# Patient Record
Sex: MALE | Employment: FULL TIME | URBAN - METROPOLITAN AREA
[De-identification: names, ages, dates, MRNs, and addresses within clinical notes are randomized per-mention and may not be internally consistent; named-entity substitution may affect disease eponyms.]

---

## 2023-03-22 ENCOUNTER — MEDICAL CORRESPONDENCE (OUTPATIENT)
Dept: HEALTH INFORMATION MANAGEMENT | Facility: CLINIC | Age: 48
End: 2023-03-22
Payer: COMMERCIAL

## 2023-03-23 ENCOUNTER — TRANSCRIBE ORDERS (OUTPATIENT)
Dept: OTHER | Age: 48
End: 2023-03-23

## 2023-03-23 DIAGNOSIS — T85.09XA OBSTRUCTED VENTRICULOPERITONEAL SHUNT, INITIAL ENCOUNTER (H): Primary | ICD-10-CM

## 2023-03-24 ENCOUNTER — TRANSCRIBE ORDERS (OUTPATIENT)
Dept: OTHER | Age: 48
End: 2023-03-24

## 2023-03-24 DIAGNOSIS — T85.09XA OBSTRUCTED VENTRICULOPERITONEAL SHUNT, INITIAL ENCOUNTER (H): Primary | ICD-10-CM

## 2023-03-27 ENCOUNTER — TELEPHONE (OUTPATIENT)
Dept: NEUROSURGERY | Facility: CLINIC | Age: 48
End: 2023-03-27
Payer: COMMERCIAL

## 2023-03-28 NOTE — TELEPHONE ENCOUNTER
Referred by Dr. Venkata Andrea at [address] [address]   Ref phone: [phone]   Ref fax: [phone]   Please call to schedule your appointment             Order Questions    Question Answer   Preferred Location: [facility] Neurosurgery [facility]   Scheduling Instructions: Please call to schedule your appointment   My Clinical Question Is: Obstructed ventriculoperitoneal shunt.   Patient has previously seen Dr. Kyle

## 2023-04-05 ENCOUNTER — HOSPITAL ENCOUNTER (OUTPATIENT)
Dept: CT IMAGING | Facility: CLINIC | Age: 48
Discharge: HOME | End: 2023-04-05
Attending: NURSE PRACTITIONER
Payer: COMMERCIAL

## 2023-04-05 ENCOUNTER — OFFICE VISIT (OUTPATIENT)
Dept: NEUROSURGERY | Facility: CLINIC | Age: 48
End: 2023-04-05
Attending: FAMILY MEDICINE
Payer: COMMERCIAL

## 2023-04-05 VITALS
HEART RATE: 92 BPM | BODY MASS INDEX: 28.95 KG/M2 | WEIGHT: 206.79 LBS | DIASTOLIC BLOOD PRESSURE: 70 MMHG | SYSTOLIC BLOOD PRESSURE: 112 MMHG | OXYGEN SATURATION: 95 % | HEIGHT: 71 IN

## 2023-04-05 DIAGNOSIS — G91.2 IDIOPATHIC NORMAL PRESSURE HYDROCEPHALUS (H): ICD-10-CM

## 2023-04-05 DIAGNOSIS — Z98.2 VP (VENTRICULOPERITONEAL) SHUNT STATUS: Primary | ICD-10-CM

## 2023-04-05 DIAGNOSIS — Z98.2 VP (VENTRICULOPERITONEAL) SHUNT STATUS: ICD-10-CM

## 2023-04-05 PROCEDURE — 74176 CT ABD & PELVIS W/O CONTRAST: CPT

## 2023-04-05 PROCEDURE — 99212 OFFICE O/P EST SF 10 MIN: CPT | Mod: 25 | Performed by: NURSE PRACTITIONER

## 2023-04-05 PROCEDURE — 70450 CT HEAD/BRAIN W/O DYE: CPT

## 2023-04-05 PROCEDURE — 74176 CT ABD & PELVIS W/O CONTRAST: CPT | Mod: 26 | Performed by: RADIOLOGY

## 2023-04-05 PROCEDURE — 99203 OFFICE O/P NEW LOW 30 MIN: CPT | Performed by: NURSE PRACTITIONER

## 2023-04-05 PROCEDURE — 70450 CT HEAD/BRAIN W/O DYE: CPT | Mod: 26 | Performed by: RADIOLOGY

## 2023-04-05 NOTE — PROGRESS NOTES
Neurosurgery Clinic    Dear Dr. Andrea,   Thank you for referring Edwin Clements to the pediatric neurosurgery clinic at the [facility].   I had the opportunity to meet with Edwin Clements and parents on April 5, 2023.    As you know, Edwin is a 48 year old male with a history of pseudotumor cerebri or idiopathic intracranial hypertension, status post ventriculoperitoneal shunt, his last revision was in 2015 by Dr. Kyle. Edwin notes that prior to shunt placement he was having seizures and headaches, he had multiple LPs. He notes after the shunt he had been doing well. He notes when he has a malfunction, he gets very contracted, headaches and seizures. He notes that a few weeks ago, he began experiencing severe abdominal pain, and noted a lump in the lower right side of his abdomen. He presented to an outside hospital where they noni labs, notable for an elevated CRP and abdominal imaging which revealed a cystic structure. He was directed to follow up at the [facility] for ongoing management. He presents today to discuss next steps. He notes his abdominal pain has significantly improved. He states he will get intermittent cramps but nothing severe. He denies any fevers, no redness, swelling or drainage. He denies any headaches. He is eating well and not vomiting. He is sleeping well and is awake and active throughout the day. He denies any vision changes. He denies any similar symptoms to prior shunt malfunction.     Past Medical History:   Diagnosis Date     Hydrocephalus (H)      Hypertension      Pseudotumor cerebri        Past Surgical History:   Procedure Laterality Date     APPENDECTOMY       HC OR CATH ABLATION NON-CARDIAC ENDOVASCULAR       IMPLANT SHUNT VENTRICULOPERITONEAL       REVISE SHUNT VENTRICULARPERITONEAL Right 3/26/2015    Procedure: REVISE SHUNT VENTRICULARPERITONEAL;  Surgeon: Ulisses Kyle MD;  Location:  OR  "      ALLERGIES:  Patient has no known allergies.    Current Outpatient Medications   Medication Sig Dispense Refill     hydrochlorothiazide (HYDRODIURIL) 25 MG tablet Take 25 mg by mouth daily       lisinopril (PRINIVIL,ZESTRIL) 40 MG tablet Take 40 mg by mouth daily       metoprolol (TOPROL-XL) 100 MG 24 hr tablet Take 100 mg by mouth daily       oxyCODONE (ROXICODONE) 5 MG immediate release tablet Take 1-2 tablets (5-10 mg) by mouth every 6 hours as needed for moderate to severe pain 60 tablet 0     senna-docusate (SENOKOT-S;PERICOLACE) 8.6-50 MG per tablet Take 1 tablet by mouth 2 times daily 60 tablet 1     amoxicillin (AMOXIL) 875 MG tablet Take by mouth 2 times daily Take twice a day for 14 days (Patient not taking: Reported on 4/5/2023)         No family history on file.    Social History     Tobacco Use     Smoking status: Former     Smokeless tobacco: Not on file   Vaping Use     Vaping status: Not on file   Substance Use Topics     Alcohol use: Yes         PHYSICAL EXAM:   /70 (BP Location: Right arm, Patient Position: Sitting)   Pulse 92   Ht 5' 10.67\" (179.5 cm)   Wt 206 lb 12.7 oz (93.8 kg)   SpO2 95%   BMI 29.11 kg/m      Awake and alert, no acute distress  PERRL, EOMI, face symmetric, tongue midline   shunt palpated without tenderness, no redness, swelling or drainage noted  + bowel sounds, no discomfort upon palpation    IMAGES:   CT head:                                                             Impression:  1. The shunted ventricular system is normal in caliber, but slightly more dilated compared to 3/4/2015.  2. No acute intracranial pathology.    CT abdomen:    IMPRESSION:   1. Decreased size of the loculated fluid collection in the central abdomen about the ventriculoperitoneal shunt tip measuring 14.6 cm in greatest dimension (previously 19.9 cm).   2. Stable 9 mm hemorrhagic/proteinaceous renal cyst in the right lower pole.  3. Additional incidental/chronic findings as noted " above.    ASSESSMENT:  Edwin Clements, 48 year old male with pseudotumor cerebri with shunt, neurologically stable and progressing well with decrease size of his loculated fluid collection    PLAN:  - will discuss with Dr. Stokes team to get him to be seen, worry about infectious process so would consider additional laboratory assessment and advised him to follow up with PCP  - Edwin Clements and family were counseled to please contact us with any acute worsening of symptoms, or with any questions or concerns.     This patient was discussed with neurosurgery faculty, who agrees with the above.  Krissy Hooker NP on 4/5/2023 at 3:56 PM

## 2023-04-05 NOTE — PATIENT INSTRUCTIONS
You met with Pediatric Neurosurgery at the [facility]    [address] Dr., NP      Pediatric Appointment Scheduling and Call Center:   [phone]    Nurse Practitioner  [phone]    Mailing Address  [address] [address]    Street Address   [address] [place] [address]    Fax Number  [phone]    For urgent matters that cannot wait until the next business day, occur over a holiday and/or weekend, report directly to your nearest ER or you may call [phone] and ask to page the Pediatric Neurosurgery Resident on call.

## 2023-04-05 NOTE — NURSING NOTE
Chief Complaint   Patient presents with     Consult     Obstructed ventriculoperitoneal shunt       There were no vitals filed for this visit.      Patient MyChart Active? No  If no, would they like to sign up? Yes    Has patient signed a Consent to Communicate form to discuss health information with guardians if applicable? Does not apply     Does patient need PHQ-2 completed today? Yes    Depression Response    Patient completed the PHQ-9 assessment for depression and scored >9? Yes  Question 9 on the PHQ-9 was positive for suicidality? Does not apply   Does patient have current mental health provider? Does not apply     I personally notified the following:      Jenna Macdonald  April 5, 2023

## 2023-04-11 ENCOUNTER — VIRTUAL VISIT (OUTPATIENT)
Dept: NEUROSURGERY | Facility: CLINIC | Age: 48
End: 2023-04-11
Payer: COMMERCIAL

## 2023-04-11 DIAGNOSIS — Z98.2 VP (VENTRICULOPERITONEAL) SHUNT STATUS: Primary | ICD-10-CM

## 2023-04-11 PROCEDURE — 99213 OFFICE O/P EST LOW 20 MIN: CPT | Mod: VID | Performed by: NEUROLOGICAL SURGERY

## 2023-04-11 NOTE — NURSING NOTE
Chief Complaint   Patient presents with     Video Visit     Follow up      Is the patient currently in the state of [place]? NO    Visit mode:VIDEO    If the visit is dropped, the patient can be reconnected by: VIDEO VISIT: Text to cell phone: [phone]    Will anyone else be joining the visit? NO      How would you like to obtain your AVS? MyChart    Are changes needed to the allergy or medication list? NO    Reason for visit: Follow up

## 2023-04-11 NOTE — PROGRESS NOTES
Virtual Visit Details    Type of service:  Video Visit     Originating Location (pt. Location):     20 minutes for visit.

## 2023-04-11 NOTE — LETTER
4/11/2023       RE: Edwin Clements  [address]  [facility] [address]     Dear Colleague,    Thank you for referring your patient, Edwin Clements, to the [facility] NEUROSURGERY CLINIC [place] at [facility]. Please see a copy of my visit note below.    Dear Dr. Chance,  I had the opportunity to see Mr. Clements today in my clinic via a virtual video visit.  He was in agreement with this.  Mr. Novak has been a patient of Dr. Ulisses Flores.  Dr. Flores has subsequently retired he had revised his chance that it was broken the surgery itself went fine.  The patient has had a shunt since 1991 and is dependent on that shunt.  He is aware when the shunt does not work.  When the shunt broke he developed mass along his chest wall.  This went away after Dr. Flores had repaired.  He has been doing well in the past 5 years.  However he was on his way to [place] when he was suffering from abdominal pain.  On work-up he was found to have a cyst intra-abdominal.  It appears that this shunt tip ends in the cyst.  Thinking back he has been having symptoms before he went to [place].  However the symptoms can go away by itself as well.  Because of the cyst he was referred to me.  I reviewed the images.  It appears that there is some mass effect to the adjacent tissues such as the intestines and stomach.  Sometimes this can be due to scarring from previous surgeries from which she has had.  Including 2 shunt revisions and appendix.  However he did have a CRP which is slightly elevated.  This may indicate he has a low-grade infection.  Therefore instead of just removing the shunt and placing the catheter in a different incision I have proposed to him that he has the distal portion externalized.  That way the CSF can be cultured.  If it appears that the infection is present, then the shunt needs to be replaced in its entirety.  However infectious still sterile then we will  replace the distal portion that has been externalized with the help of the general surgeons into a different area of the abdomen.  Currently the patient is feeling quite well.  He has since started at work and this is the busy season for him.  Ideally he would like to have this done in the fall.  Especially since he has minimal symptoms at the current time.  I told him that if he was to have any symptoms to please be in contact with us.  We can start implementing this plan that I had outlined.  Incidentally he has a cyst that has been noted in the his inferior pole of his kidney.  I had informed him to let his primary care doctor know.  I will schedule an appointment with him in the fall to make sure he does not fall in the cracks. Thank you          Again, thank you for allowing me to participate in the care of your patient.      Sincerely,    Arnaud Martínez MD

## 2023-04-17 ENCOUNTER — TELEPHONE (OUTPATIENT)
Dept: NEUROSURGERY | Facility: CLINIC | Age: 48
End: 2023-04-17
Payer: COMMERCIAL

## 2023-04-17 ENCOUNTER — APPOINTMENT (OUTPATIENT)
Dept: CT IMAGING | Facility: CLINIC | Age: 48
End: 2023-04-17
Attending: EMERGENCY MEDICINE
Payer: COMMERCIAL

## 2023-04-17 ENCOUNTER — HOSPITAL ENCOUNTER (INPATIENT)
Facility: CLINIC | Age: 48
LOS: 16 days | Discharge: HOME HEALTH | End: 2023-05-03
Attending: EMERGENCY MEDICINE | Admitting: SURGERY
Payer: COMMERCIAL

## 2023-04-17 DIAGNOSIS — G91.9 HYDROCEPHALUS, UNSPECIFIED TYPE (H): Primary | ICD-10-CM

## 2023-04-17 DIAGNOSIS — Z11.52 ENCOUNTER FOR SCREENING LABORATORY TESTING FOR SEVERE ACUTE RESPIRATORY SYNDROME CORONAVIRUS 2 (SARS-COV-2): ICD-10-CM

## 2023-04-17 DIAGNOSIS — R51.9 NONINTRACTABLE HEADACHE, UNSPECIFIED CHRONICITY PATTERN, UNSPECIFIED HEADACHE TYPE: ICD-10-CM

## 2023-04-17 DIAGNOSIS — Z98.2 VP (VENTRICULOPERITONEAL) SHUNT STATUS: ICD-10-CM

## 2023-04-17 LAB
ALBUMIN SERPL BCG-MCNC: 4.2 G/DL (ref 3.5–5.2)
ALP SERPL-CCNC: 90 U/L (ref 40–129)
ALT SERPL W P-5'-P-CCNC: 9 U/L (ref 10–50)
ANION GAP SERPL CALCULATED.3IONS-SCNC: 13 MMOL/L (ref 7–15)
APPEARANCE CSF: CLEAR
APTT PPP: 30 SECONDS (ref 22–38)
AST SERPL W P-5'-P-CCNC: 18 U/L (ref 10–50)
BASOPHILS # BLD AUTO: 0 10E3/UL (ref 0–0.2)
BASOPHILS NFR BLD AUTO: 0 %
BILIRUB SERPL-MCNC: 0.4 MG/DL
BUN SERPL-MCNC: 14.8 MG/DL (ref 6–20)
CALCIUM SERPL-MCNC: 9.5 MG/DL (ref 8.6–10)
CHLORIDE SERPL-SCNC: 100 MMOL/L (ref 98–107)
COLOR CSF: COLORLESS
CREAT SERPL-MCNC: 0.67 MG/DL (ref 0.67–1.17)
CRP SERPL-MCNC: 53.2 MG/L
DEPRECATED HCO3 PLAS-SCNC: 26 MMOL/L (ref 22–29)
EOSINOPHIL # BLD AUTO: 0.1 10E3/UL (ref 0–0.7)
EOSINOPHIL NFR BLD AUTO: 1 %
ERYTHROCYTE [DISTWIDTH] IN BLOOD BY AUTOMATED COUNT: 13.9 % (ref 10–15)
GFR SERPL CREATININE-BSD FRML MDRD: >90 ML/MIN/1.73M2
GLUCOSE CSF-MCNC: 57 MG/DL (ref 40–70)
GLUCOSE SERPL-MCNC: 96 MG/DL (ref 70–99)
HCT VFR BLD AUTO: 42 % (ref 40–53)
HGB BLD-MCNC: 13.7 G/DL (ref 13.3–17.7)
IMM GRANULOCYTES # BLD: 0 10E3/UL
IMM GRANULOCYTES NFR BLD: 0 %
INR PPP: 1.02 (ref 0.85–1.15)
LYMPHOCYTES # BLD AUTO: 2 10E3/UL (ref 0.8–5.3)
LYMPHOCYTES NFR BLD AUTO: 22 %
MCH RBC QN AUTO: 29.8 PG (ref 26.5–33)
MCHC RBC AUTO-ENTMCNC: 32.6 G/DL (ref 31.5–36.5)
MCV RBC AUTO: 91 FL (ref 78–100)
MONOCYTES # BLD AUTO: 0.9 10E3/UL (ref 0–1.3)
MONOCYTES NFR BLD AUTO: 10 %
NEUTROPHILS # BLD AUTO: 6 10E3/UL (ref 1.6–8.3)
NEUTROPHILS NFR BLD AUTO: 67 %
NRBC # BLD AUTO: 0 10E3/UL
NRBC BLD AUTO-RTO: 0 /100
PLATELET # BLD AUTO: 400 10E3/UL (ref 150–450)
POTASSIUM SERPL-SCNC: 3.7 MMOL/L (ref 3.4–5.3)
PROT CSF-MCNC: 7.7 MG/DL (ref 15–45)
PROT SERPL-MCNC: 7.7 G/DL (ref 6.4–8.3)
RBC # BLD AUTO: 4.6 10E6/UL (ref 4.4–5.9)
RBC # CSF MANUAL: 1 /UL (ref 0–2)
SARS-COV-2 RNA RESP QL NAA+PROBE: NEGATIVE
SODIUM SERPL-SCNC: 139 MMOL/L (ref 136–145)
TUBE # CSF: NORMAL
WBC # BLD AUTO: 9 10E3/UL (ref 4–11)
WBC # CSF MANUAL: 4 /UL (ref 0–5)

## 2023-04-17 PROCEDURE — 89050 BODY FLUID CELL COUNT: CPT

## 2023-04-17 PROCEDURE — 120N000002 HC R&B MED SURG/OB UMMC

## 2023-04-17 PROCEDURE — 70450 CT HEAD/BRAIN W/O DYE: CPT | Mod: 26 | Performed by: RADIOLOGY

## 2023-04-17 PROCEDURE — 85025 COMPLETE CBC W/AUTO DIFF WBC: CPT | Performed by: EMERGENCY MEDICINE

## 2023-04-17 PROCEDURE — 36415 COLL VENOUS BLD VENIPUNCTURE: CPT | Performed by: EMERGENCY MEDICINE

## 2023-04-17 PROCEDURE — 82945 GLUCOSE OTHER FLUID: CPT

## 2023-04-17 PROCEDURE — 70450 CT HEAD/BRAIN W/O DYE: CPT

## 2023-04-17 PROCEDURE — 250N000013 HC RX MED GY IP 250 OP 250 PS 637: Performed by: EMERGENCY MEDICINE

## 2023-04-17 PROCEDURE — 99285 EMERGENCY DEPT VISIT HI MDM: CPT | Mod: 25 | Performed by: EMERGENCY MEDICINE

## 2023-04-17 PROCEDURE — 85730 THROMBOPLASTIN TIME PARTIAL: CPT | Performed by: EMERGENCY MEDICINE

## 2023-04-17 PROCEDURE — U0003 INFECTIOUS AGENT DETECTION BY NUCLEIC ACID (DNA OR RNA); SEVERE ACUTE RESPIRATORY SYNDROME CORONAVIRUS 2 (SARS-COV-2) (CORONAVIRUS DISEASE [COVID-19]), AMPLIFIED PROBE TECHNIQUE, MAKING USE OF HIGH THROUGHPUT TECHNOLOGIES AS DESCRIBED BY CMS-2020-01-R: HCPCS

## 2023-04-17 PROCEDURE — 8C01X6J COLLECTION OF CEREBROSPINAL FLUID FROM INDWELLING DEVICE IN NERVOUS SYSTEM: ICD-10-PCS | Performed by: NEUROLOGICAL SURGERY

## 2023-04-17 PROCEDURE — 87015 SPECIMEN INFECT AGNT CONCNTJ: CPT

## 2023-04-17 PROCEDURE — 80053 COMPREHEN METABOLIC PANEL: CPT | Performed by: EMERGENCY MEDICINE

## 2023-04-17 PROCEDURE — 86140 C-REACTIVE PROTEIN: CPT

## 2023-04-17 PROCEDURE — 87075 CULTR BACTERIA EXCEPT BLOOD: CPT

## 2023-04-17 PROCEDURE — 84157 ASSAY OF PROTEIN OTHER: CPT

## 2023-04-17 PROCEDURE — C9803 HOPD COVID-19 SPEC COLLECT: HCPCS | Performed by: EMERGENCY MEDICINE

## 2023-04-17 PROCEDURE — 85610 PROTHROMBIN TIME: CPT | Performed by: EMERGENCY MEDICINE

## 2023-04-17 PROCEDURE — 99285 EMERGENCY DEPT VISIT HI MDM: CPT | Performed by: EMERGENCY MEDICINE

## 2023-04-17 PROCEDURE — 250N000013 HC RX MED GY IP 250 OP 250 PS 637

## 2023-04-17 RX ORDER — ACETAMINOPHEN 325 MG/1
325-650 TABLET ORAL EVERY 4 HOURS PRN
Status: DISCONTINUED | OUTPATIENT
Start: 2023-04-17 | End: 2023-05-02

## 2023-04-17 RX ORDER — HYDROCODONE BITARTRATE AND ACETAMINOPHEN 5; 325 MG/1; MG/1
2 TABLET ORAL ONCE
Status: COMPLETED | OUTPATIENT
Start: 2023-04-17 | End: 2023-04-17

## 2023-04-17 RX ORDER — OXYCODONE HYDROCHLORIDE 5 MG/1
5 TABLET ORAL EVERY 4 HOURS PRN
Status: DISCONTINUED | OUTPATIENT
Start: 2023-04-17 | End: 2023-04-25

## 2023-04-17 RX ORDER — HYDROMORPHONE HCL IN WATER/PF 6 MG/30 ML
0.2 PATIENT CONTROLLED ANALGESIA SYRINGE INTRAVENOUS
Status: DISCONTINUED | OUTPATIENT
Start: 2023-04-17 | End: 2023-04-19

## 2023-04-17 RX ADMIN — OXYCODONE HYDROCHLORIDE 5 MG: 5 TABLET ORAL at 18:10

## 2023-04-17 RX ADMIN — HYDROCODONE BITARTRATE AND ACETAMINOPHEN 2 TABLET: 5; 325 TABLET ORAL at 17:05

## 2023-04-17 RX ADMIN — ACETAMINOPHEN 650 MG: 325 TABLET, FILM COATED ORAL at 22:48

## 2023-04-17 RX ADMIN — OXYCODONE HYDROCHLORIDE 5 MG: 5 TABLET ORAL at 22:45

## 2023-04-17 ASSESSMENT — ACTIVITIES OF DAILY LIVING (ADL)
ADLS_ACUITY_SCORE: 35
ADLS_ACUITY_SCORE: 35
DOING_ERRANDS_INDEPENDENTLY_DIFFICULTY: NO
ADLS_ACUITY_SCORE: 18
WEAR_GLASSES_OR_BLIND: NO
CHANGE_IN_FUNCTIONAL_STATUS_SINCE_ONSET_OF_CURRENT_ILLNESS/INJURY: NO
TOILETING_ISSUES: NO
ADLS_ACUITY_SCORE: 35
FALL_HISTORY_WITHIN_LAST_SIX_MONTHS: NO
ADLS_ACUITY_SCORE: 33
CONCENTRATING,_REMEMBERING_OR_MAKING_DECISIONS_DIFFICULTY: NO
DIFFICULTY_EATING/SWALLOWING: NO
DRESSING/BATHING_DIFFICULTY: NO
WALKING_OR_CLIMBING_STAIRS_DIFFICULTY: NO

## 2023-04-17 NOTE — ED TRIAGE NOTES
Pt has shunt in head. Pt feels he is having increased pressure in head, having head pain. Feels shunt is not working. Dr. Ann's office referred pt here. Also having generalized pressure pain.     Triage Assessment     Row Name 04/17/23 3758       Triage Assessment (Adult)    Airway WDL WDL       Respiratory WDL    Respiratory WDL WDL       Skin Circulation/Temperature WDL    Skin Circulation/Temperature WDL WDL       Cardiac WDL    Cardiac WDL WDL       Peripheral/Neurovascular WDL    Peripheral Neurovascular WDL WDL       Cognitive/Neuro/Behavioral WDL    Cognitive/Neuro/Behavioral WDL WDL

## 2023-04-17 NOTE — TELEPHONE ENCOUNTER
[facility] Call Center    Phone Message    May a detailed message be left on voicemail: yes     Reason for Call: Symptoms or Concerns     If patient has red-flag symptoms, warm transfer to triage line    Current symptom or concern: Severe Headaches, slight fever lastnight    Symptoms have been present for:  1 day(s)    Has patient previously been seen for this? Yes    By : Dr. Martínez    Date: 4/11/23    Are there any new or worsening symptoms? Yes: Symptoms just started and have been worsening through the night.  Patient and wife were told to call and let us know if he experiences any of these issues after his previous visit with Dr. Martínez.      Action Taken: Message routed to:  [facility] ([facility]): [facility]    Travel Screening: Not Applicable

## 2023-04-17 NOTE — TELEPHONE ENCOUNTER
Writer routed to Neurosurgery Nurse Pool   Attn: Kelly Gillespie, RNCC for Dr. Martínez  *Patient reports symptoms     Cece Amanda LPN  Neurosurgery

## 2023-04-17 NOTE — ED PROVIDER NOTES
[place] EMERGENCY DEPARTMENT ([facility])    4/17/23       ED PROVIDER NOTE   Vertical Triage A     History     Chief Complaint   Patient presents with     Headache     HPI  Edwin Clements is a 48 year old male with history of pseudotumor cerebri s/p  shunt placement in 1991 with replacement this year at [facility] who presents for further evaluation of headache and pressure concerning for shunt malfunction.  He developed severe headaches and a slight fever last night, similar to when he has had prior shunt malfunctions.  He contacted neurosurgery service and was told to come here for further evaluation.       Epic records reviewed, he was seen at [facility] ED on 3/21/2023 for abdominal pain.  Neurosurgery was consulted, he was found to have a's large 17 cm loculated fluid collection associated with the  shunt.     Per [facility] neurosurgery neurosurgery 3/21/2023:  Tip of shunt is likely draining into this loculated collection therefore it needs to be drained and the shunt needs to be relocated. Based on patients status and labs unlikely to be infected therefore no need to replace. Would recommend culturing fluid to be sure though.    PLAN  - No acute neurosurgical intervention  - Recommend general surgery evaluation for drainage of collection and relocation of  shunt tip  - Please send cultures from fluid if drained  - Neurosurgery available for operative assistance if needed    Since then patient has had a follow-up with neurosurgery, it was felt that there is a decreased size of the loculated fluid collection based on CT imaging.  He was seen virtually by Dr. Arnaud Martínez on 4/11/2023 and discussed plan to externalize the distal portion of the  shunt and that way the CSF could be cultured.  If this was positive for infection the shunt could be replaced in entirety however if the CSF was still sterile they would replace the distal portion was externalized with help  of general surgeons to a different area of the abdomen.  Patient opted to have this done in the fall as it is the busy season for him at work    Past Medical History  Past Medical History:   Diagnosis Date     Hydrocephalus (H)      Hypertension      Pseudotumor cerebri      Past Surgical History:   Procedure Laterality Date     APPENDECTOMY       HC OR CATH ABLATION NON-CARDIAC ENDOVASCULAR       IMPLANT SHUNT VENTRICULOPERITONEAL       REVISE SHUNT VENTRICULARPERITONEAL Right 3/26/2015    Procedure: REVISE SHUNT VENTRICULARPERITONEAL;  Surgeon: Ulisses Kyle MD;  Location: [facility] OR     No current outpatient medications on file.    No Known Allergies  Family History  No family history on file.  Social History   Social History     Tobacco Use     Smoking status: Former   Substance Use Topics     Alcohol use: Yes      Past medical history, past surgical history, medications, allergies, family history, and social history were reviewed with the patient. No additional pertinent items.      A complete review of systems was performed with pertinent positives and negatives noted in the HPI, and all other systems negative.    Physical Exam   BP: 134/86  Pulse: 64  Temp: 98.5  F (36.9  C)  Resp: 18  Height: 182.9 cm (6')  SpO2: 99 %  Physical Exam  Vitals and nursing note reviewed.   Constitutional:       General: He is not in acute distress.     Appearance: He is well-developed. He is not diaphoretic.   HENT:      Head: Normocephalic and atraumatic.   Eyes:      General: No scleral icterus.     Pupils: Pupils are equal, round, and reactive to light.   Cardiovascular:      Rate and Rhythm: Normal rate and regular rhythm.      Heart sounds: Normal heart sounds. No murmur heard.  Pulmonary:      Effort: No respiratory distress.      Breath sounds: No stridor. No wheezing or rales.   Abdominal:      General: Bowel sounds are normal.      Palpations: Abdomen is soft.      Tenderness: There is no abdominal tenderness.    Musculoskeletal:         General: No tenderness.      Cervical back: Normal range of motion and neck supple.   Skin:     General: Skin is warm and dry.      Coloration: Skin is not pale.      Findings: No erythema or rash.   Neurological:      Mental Status: He is alert and oriented to person, place, and time.      Sensory: No sensory deficit.      Coordination: Coordination normal.         ED Course, Procedures, & Data      Procedures               Results for orders placed or performed during the hospital encounter of 04/17/23   CT Head w/o Contrast     Status: None    Narrative    CT HEAD W/O CONTRAST 4/17/2023 3:16 PM    History: Shunt, increased pain; Headache; Existing HA disorder with  clinical progression; No known/automatically detected potential  contraindications to MRI     Comparison: 4/5/2023 CT head, 3/4/2015 CT head    Technique: Using multidetector thin collimation helical acquisition  technique, axial, coronal and sagittal CT images from the skull base  to the vertex were obtained without intravenous contrast.   (topogram) image(s) also obtained and reviewed.    Findings:   Stable position of the right ventriculostomy shunt with the tip  terminating in the third ventricle. The ventricular system is stable  in size when compared to the 4/5/2023 exam. Incidental midline  arachnoid cyst in the posterior fossa. No periventricular  hypoattenuation. There is no mass effect, midline shift, intracranial  hemorrhage or acute loss of gray-white differentiation.    The bony calvaria and the bones of the skull base are normal. The  visualized portions of the paranasal sinuses and mastoid air cells are  clear.       Impression    Impression:  1. Stable position of right ventriculostomy catheter.  2. Stable size of the ventricular system.  3. No acute intracranial findings.    I have personally reviewed the examination and initial interpretation  and I agree with the findings.    JOSE VOGT MD          SYSTEM ID:  [id number]   Comprehensive metabolic panel     Status: Abnormal   Result Value Ref Range    Sodium 139 136 - 145 mmol/L    Potassium 3.7 3.4 - 5.3 mmol/L    Chloride 100 98 - 107 mmol/L    Carbon Dioxide (CO2) 26 22 - 29 mmol/L    Anion Gap 13 7 - 15 mmol/L    Urea Nitrogen 14.8 6.0 - 20.0 mg/dL    Creatinine 0.67 0.67 - 1.17 mg/dL    Calcium 9.5 8.6 - 10.0 mg/dL    Glucose 96 70 - 99 mg/dL    Alkaline Phosphatase 90 40 - 129 U/L    AST 18 10 - 50 U/L    ALT 9 (L) 10 - 50 U/L    Protein Total 7.7 6.4 - 8.3 g/dL    Albumin 4.2 3.5 - 5.2 g/dL    Bilirubin Total 0.4 <=1.2 mg/dL    GFR Estimate >90 >60 mL/min/1.73m2   INR     Status: Normal   Result Value Ref Range    INR 1.02 0.85 - 1.15   Partial thromboplastin time     Status: Normal   Result Value Ref Range    aPTT 30 22 - 38 Seconds   CBC with platelets and differential     Status: None   Result Value Ref Range    WBC Count 9.0 4.0 - 11.0 10e3/uL    RBC Count 4.60 4.40 - 5.90 10e6/uL    Hemoglobin 13.7 13.3 - 17.7 g/dL    Hematocrit 42.0 40.0 - 53.0 %    MCV 91 78 - 100 fL    MCH 29.8 26.5 - 33.0 pg    MCHC 32.6 31.5 - 36.5 g/dL    RDW 13.9 10.0 - 15.0 %    Platelet Count 400 150 - 450 10e3/uL    % Neutrophils 67 %    % Lymphocytes 22 %    % Monocytes 10 %    % Eosinophils 1 %    % Basophils 0 %    % Immature Granulocytes 0 %    NRBCs per 100 WBC 0 <1 /100    Absolute Neutrophils 6.0 1.6 - 8.3 10e3/uL    Absolute Lymphocytes 2.0 0.8 - 5.3 10e3/uL    Absolute Monocytes 0.9 0.0 - 1.3 10e3/uL    Absolute Eosinophils 0.1 0.0 - 0.7 10e3/uL    Absolute Basophils 0.0 0.0 - 0.2 10e3/uL    Absolute Immature Granulocytes 0.0 <=0.4 10e3/uL    Absolute NRBCs 0.0 10e3/uL   Asymptomatic COVID-19 Virus (Coronavirus) by PCR Nasopharyngeal     Status: Normal    Specimen: Nasopharyngeal; Swab   Result Value Ref Range    SARS CoV2 PCR Negative Negative    Narrative    Testing was performed using the Xpert Xpress SARS-CoV-2 Assay on the Cepheid Gene-Xpert Instrument  Systems. Additional information about this Emergency Use Authorization (EUA) assay can be found via the Lab Guide. This test should be ordered for the detection of SARS-CoV-2 in individuals who meet SARS-CoV-2 clinical and/or epidemiological criteria as well as from individuals without symptoms or other reasons to suspect COVID-19. Test performance for asymptomatic patients has only been established in anterior nasal swab specimens. This test is for in vitro diagnostic use under the FDA EUA for laboratories certified under CLIA to perform high complexity testing. This test has not been FDA cleared or approved. A negative result does not rule out the presence of PCR inhibitors in the specimen or target RNA concentration below the limit of detection for the assay. The possibility of a false negative should be considered if the patient's recent exposure or clinical presentation suggests COVID-19. This test was validated by [facility]. These Laboratories are certified under the Clinical Laboratory Improvement Amendments (CLIA) as qualified to perform high complexity testing.       Medications   oxyCODONE (ROXICODONE) tablet 5 mg (5 mg Oral $Given 4/18/23 2558)   acetaminophen (TYLENOL) tablet 325-650 mg (650 mg Oral $Given 4/18/23 0407)   HYDROmorphone (DILAUDID) injection 0.2 mg ( Intravenous Held by provider 4/17/23 3444)   HYDROmorphone (DILAUDID) injection 0.2 mg (0.2 mg Intravenous $Given 4/18/23 1133)   naloxone (NARCAN) injection 0.2 mg (has no administration in time range)     Or   naloxone (NARCAN) injection 0.4 mg (has no administration in time range)     Or   naloxone (NARCAN) injection 0.2 mg (has no administration in time range)     Or   naloxone (NARCAN) injection 0.4 mg (has no administration in time range)   lidocaine (PF) (XYLOCAINE) 1 % injection 10 mL (has no administration in time range)   lidocaine (PF) (XYLOCAINE) 1 % injection (has no administration in time range)    HYDROcodone-acetaminophen (NORCO) 5-325 MG per tablet 2 tablet (2 tablets Oral $Given 4/17/23 8695)   fentaNYL (PF) (SUBLIMAZE) injection 25-50 mcg (25 mcg Intravenous $Given 4/18/23 1360)              Critical care was not performed.     Medical Decision Making  The patient's presentation was of moderate complexity (an undiagnosed new problem with uncertain diagnosis).    The patient's evaluation involved:  review of external note(s) from 2 sources (see separate area of note for details)  ordering and/or review of 3+ test(s) in this encounter (see separate area of note for details)  review of 2 test result(s) ordered prior to this encounter (see separate area of note for details)  independent interpretation of testing performed by another health professional (see separate area of note for details)  discussion of management or test interpretation with another health professional (see separate area of note for details)    The patient's management necessitated high risk (a decision regarding hospitalization).      Assessment & Plan    48 year old male with history of pseudotumor cerebri s/p  shunt placement in 1991 with replacement this year at [facility] who presents for further evaluation of headache and pressure concerning for shunt malfunction.  Patient had a CT scan of the abdomen performed on 3/21/2023 which showed a fluid collection near the tip of the shunt in the abdomen.  This was repeated with decreased size of the collection.  Patient reports now that his headache feels similar to what he had prior to having his shunt placed.      He has a nonfocal neurologic exam, and a nontender abdomen.    CT scan of the head was performed which showed stable position of the right ventriculostomy catheter and stable size of the ventricular system.  I independently reviewed the images and saw no evidence of intracranial hemorrhage or mass.    Patient's white count was 9.0 with a hemoglobin of 13.7.   Comprehensive metabolic profile is normal.  Coags are normal.  These labs are consistent with previous readings.    Neurosurgery was contacted.  Patient was provided pain management.    Neurosurgery felt patient should be admitted to further evaluate his headaches and the functioning of his  shunt.  Patient was in agreement with this plan.    I have reviewed the nursing notes. I have reviewed the findings, diagnosis, plan and need for follow up with the patient.    Current Discharge Medication List          Final diagnoses:    (ventriculoperitoneal) shunt status       Jw Grullon MD  [facility] EMERGENCY DEPARTMENT  4/17/2023     Jw Grullon MD  04/18/23 1243

## 2023-04-17 NOTE — H&P
"[facility]       H&P NOTE    HPI: Edwin Clements is a 48 year old male who was seen for headache with concern for shunt malfunction.  He has a history of idiopathic intracranial hypertension, with  shunt placed in 1992.  His shunt was thereafter revised by Dr. Kyle in 2015 due to distal catheter fracture that resulted in a subcutaneous fluid collection on the patient's chest.  There is currently no available documentation in our electronic medical record regarding the exact model or setting of the shunt.    The patient was recently seen by Dr. Martínez via virtual visit approximately 1 week ago after outside hospital identified that the patient had a pseudocyst surrounding the distal catheter.  The patient had been feeling well up until that visit, so plan was made for distal catheter revision in the fall, at the patient's convenience.  However for the last several days the patient has started to have headaches that remind him of the type of headaches that he had prior to shunt being placed, and his vision is becoming blurry as well.  These are holocranial headaches that are worse behind his eyes and feel like his \"eyes are going to explode.\"  They have been increasing in intensity over the last few days.  Previous to the pseudocyst being identified, the patient had significant abdominal pain, which has now subsided.  No significant fever, although this morning he reports his temperature was 100 degrees.  No other infectious symptoms.  No nausea no vomiting, no double vision.        PAST MEDICAL HISTORY:   Past Medical History:   Diagnosis Date     Hydrocephalus (H)      Hypertension      Pseudotumor cerebri        PAST SURGICAL HISTORY:   Past Surgical History:   Procedure Laterality Date     APPENDECTOMY       HC OR CATH ABLATION NON-CARDIAC ENDOVASCULAR       IMPLANT SHUNT VENTRICULOPERITONEAL       REVISE SHUNT VENTRICULARPERITONEAL Right 3/26/2015    Procedure: " REVISE SHUNT VENTRICULARPERITONEAL;  Surgeon: Ulisses Kyle MD;  Location:  OR       FAMILY HISTORY: No family history on file.    SOCIAL HISTORY:   Social History     Tobacco Use     Smoking status: Former     Smokeless tobacco: Not on file   Vaping Use     Vaping status: Not on file   Substance Use Topics     Alcohol use: Yes       MEDICATIONS:  (Not in a hospital admission)      Allergies:  No Known Allergies    ROS: 10 point ROS of systems including Constitutional, Eyes, Respiratory, Cardiovascular, Gastroenterology, Genitourinary, Integumentary, Muscularskeletal, Psychiatric were all negative except for pertinent positives noted in my HPI.    Physical exam:   Blood pressure 134/86, pulse 64, temperature 98.5  F (36.9  C), temperature source Oral, resp. rate 18, height 1.829 m (6'), SpO2 99 %.  CV: RRR, no murmurs, rubs, or gallops  PULM: breathing comfortably on room air  ABD: soft, non-distended, BS+  NEUROLOGIC:  -- Awake; Alert; oriented x 3  -- Follows commands briskly  -- +repetition, calculation, and naming  -- Speech fluent, spontaneous. No aphasia or dysarthria.  -- no gaze preference. No apparent hemineglect.  Cranial Nerves:  -- visual fields full to confrontation, PERRL 3-2mm bilat and brisk, extraocular movements intact  -- face symmetrical, tongue midline  -- sensory V1-V3 intact bilaterally  -- palate elevates symmetrically, uvula midline  -- hearing grossly intact bilat  -- Trapezii 5/5 strength bilat symmetric    Motor:  Normal bulk / tone; no tremor, rigidity, or bradykinesia.  No muscle wasting or fasciculations  No Pronator Drift     Delt Bi Tri Hand Flexion/  Extension Iliopsoas Quadriceps Hamstrings Tibialis Anterior Gastroc    C5 C6 C7 C8/T1 L2 L3 L4-S1 L4 S1   R 5 5 5 5 5 5 5 5 5   L 5 5 5 5 5 5 5 5 5   Sensory:  intact to LT x 4 extremities     Reflexes:     Bi Tri BR Ranjit Pat Ach    C5-6 C7-8 C6 UMN L2-4 S1   R 2+ 2+ 2+ Norm 2+ 2+   L 2+ 2+ 2+ Norm 2+ 2+       LABS:  Last  Comprehensive Metabolic Panel:  Sodium   Date Value Ref Range Status   04/17/2023 139 136 - 145 mmol/L Final   03/26/2015 136 133 - 144 mmol/L Final     Potassium   Date Value Ref Range Status   04/17/2023 3.7 3.4 - 5.3 mmol/L Final   03/26/2015 3.8 3.4 - 5.3 mmol/L Final     Chloride   Date Value Ref Range Status   04/17/2023 100 98 - 107 mmol/L Final   03/26/2015 103 94 - 109 mmol/L Final     Carbon Dioxide   Date Value Ref Range Status   03/26/2015 27 20 - 32 mmol/L Final     Carbon Dioxide (CO2)   Date Value Ref Range Status   04/17/2023 26 22 - 29 mmol/L Final     Anion Gap   Date Value Ref Range Status   04/17/2023 13 7 - 15 mmol/L Final   03/26/2015 6 3 - 14 mmol/L Final     Glucose   Date Value Ref Range Status   04/17/2023 96 70 - 99 mg/dL Final   03/26/2015 87 70 - 99 mg/dL Final     Comment:     Effective 7/30/2014, the reference range for this assay has changed to reflect   new instrumentation/methodology.       Urea Nitrogen   Date Value Ref Range Status   04/17/2023 14.8 6.0 - 20.0 mg/dL Final   03/26/2015 22 7 - 30 mg/dL Final     Comment:     Effective 7/30/2014, the reference range for this assay has changed to reflect   new instrumentation/methodology.       Creatinine   Date Value Ref Range Status   04/17/2023 0.67 0.67 - 1.17 mg/dL Final   03/26/2015 0.83 0.66 - 1.25 mg/dL Final     GFR Estimate   Date Value Ref Range Status   04/17/2023 >90 >60 mL/min/1.73m2 Final     Comment:     eGFR calculated using 2021 CKD-EPI equation.   03/26/2015 >90  Non  GFR Calc   >60 mL/min/1.7m2 Final     Calcium   Date Value Ref Range Status   04/17/2023 9.5 8.6 - 10.0 mg/dL Final   03/26/2015 9.3 8.5 - 10.1 mg/dL Final     Comment:     Effective 7/30/2014, the reference range for this assay has changed to reflect   new instrumentation/methodology.       Lab Results   Component Value Date    WBC 9.0 04/17/2023    WBC 7.6 03/26/2015     Lab Results   Component Value Date    RBC 4.60 04/17/2023    RBC  5.18 03/26/2015     Lab Results   Component Value Date    HGB 13.7 04/17/2023    HGB 16.3 03/26/2015     Lab Results   Component Value Date    HCT 42.0 04/17/2023    HCT 46.2 03/26/2015     Lab Results   Component Value Date    MCV 91 04/17/2023    MCV 89 03/26/2015     Lab Results   Component Value Date    MCH 29.8 04/17/2023    MCH 31.5 03/26/2015     Lab Results   Component Value Date    MCHC 32.6 04/17/2023    MCHC 35.3 03/26/2015     Lab Results   Component Value Date    RDW 13.9 04/17/2023    RDW 13.1 03/26/2015     Lab Results   Component Value Date     04/17/2023     03/26/2015         IMAGING:  Recent Results (from the past 24 hour(s))   CT Head w/o Contrast    Narrative    CT HEAD W/O CONTRAST 4/17/2023 3:16 PM    History: Shunt, increased pain; Headache; Existing HA disorder with  clinical progression; No known/automatically detected potential  contraindications to MRI     Comparison: 4/5/2023 CT head, 3/4/2015 CT head    Technique: Using multidetector thin collimation helical acquisition  technique, axial, coronal and sagittal CT images from the skull base  to the vertex were obtained without intravenous contrast.   (topogram) image(s) also obtained and reviewed.    Findings:   Stable position of the right ventriculostomy shunt with the tip  terminating in the third ventricle. The ventricular system is stable  in size when compared to the 4/5/2023 exam. Incidental midline  arachnoid cyst in the posterior fossa. No periventricular  hypoattenuation. There is no mass effect, midline shift, intracranial  hemorrhage or acute loss of gray-white differentiation.    The bony calvaria and the bones of the skull base are normal. The  visualized portions of the paranasal sinuses and mastoid air cells are  clear.       Impression    Impression:  1. Stable position of right ventriculostomy catheter.  2. Stable size of the ventricular system.  3. No acute intracranial findings.    I have personally  reviewed the examination and initial interpretation  and I agree with the findings.    JOSE VOGT MD         SYSTEM ID:  [id number]         ASSESSMENT:  Edwin Clements is a 48-year-old man presenting with symptoms concerning for elevated intracranial pressure (progressive HA, blurry vision), with recent CT abdomen showing pseudocyst surrounding the distal catheter.  Overall picture is concerning for shunt malfunction.    Clinically Significant Risk Factors Present on Admission                  # Hypertension: home medication list includes antihypertensive(s)      # Overweight: Estimated body mass index is 28.05 kg/m  as calculated from the following:    Height as of this encounter: 1.829 m (6').    Weight as of 4/5/23: 93.8 kg (206 lb 12.7 oz).            RECOMMENDATIONS:    -Admit to NSGY service, floor status  -Bedside shunt tap, send CSF for culture  -Externalize distal catheter at bedside, send CSF for culture  -CBC, BMP, coags, CRP  -Ophthalmology consult for vision changes, assess for papilledema     The patient was discussed with Dr. Galaviz, neurosurgery chief resident, and Dr. Martínez, neurosurgery staff, and they agree with the above.    Lucia Vogt MD, PhD   PGY-1 Neurosurgery

## 2023-04-17 NOTE — TELEPHONE ENCOUNTER
Spoke with patient's spouse, Carmella, regarding patient symptoms. She reports that he continues to feel poorly and has had severe pressure headaches. Patient does believe that this is shunt related as pain is similar to what he has experienced in the past when his shunt has malfunctioned. Advised that patient should report to the emergency department at [facility] to be evaluated. Carmella verbalized agreement to this plan and has no other questions at this time. Dr. Martínez is aware of patient's pending arrival.

## 2023-04-18 ENCOUNTER — APPOINTMENT (OUTPATIENT)
Dept: GENERAL RADIOLOGY | Facility: CLINIC | Age: 48
End: 2023-04-18
Attending: STUDENT IN AN ORGANIZED HEALTH CARE EDUCATION/TRAINING PROGRAM
Payer: COMMERCIAL

## 2023-04-18 LAB
ANION GAP SERPL CALCULATED.3IONS-SCNC: 14 MMOL/L (ref 7–15)
BUN SERPL-MCNC: 11.8 MG/DL (ref 6–20)
CALCIUM SERPL-MCNC: 9.4 MG/DL (ref 8.6–10)
CHLORIDE SERPL-SCNC: 100 MMOL/L (ref 98–107)
CREAT SERPL-MCNC: 0.63 MG/DL (ref 0.67–1.17)
DEPRECATED HCO3 PLAS-SCNC: 22 MMOL/L (ref 22–29)
ERYTHROCYTE [DISTWIDTH] IN BLOOD BY AUTOMATED COUNT: 13.9 % (ref 10–15)
GFR SERPL CREATININE-BSD FRML MDRD: >90 ML/MIN/1.73M2
GLUCOSE SERPL-MCNC: 112 MG/DL (ref 70–99)
HCT VFR BLD AUTO: 44.3 % (ref 40–53)
HGB BLD-MCNC: 14.4 G/DL (ref 13.3–17.7)
MCH RBC QN AUTO: 29.5 PG (ref 26.5–33)
MCHC RBC AUTO-ENTMCNC: 32.5 G/DL (ref 31.5–36.5)
MCV RBC AUTO: 91 FL (ref 78–100)
PLATELET # BLD AUTO: 353 10E3/UL (ref 150–450)
POTASSIUM SERPL-SCNC: 3.9 MMOL/L (ref 3.4–5.3)
RBC # BLD AUTO: 4.88 10E6/UL (ref 4.4–5.9)
SODIUM SERPL-SCNC: 136 MMOL/L (ref 136–145)
WBC # BLD AUTO: 9.7 10E3/UL (ref 4–11)

## 2023-04-18 PROCEDURE — 250N000013 HC RX MED GY IP 250 OP 250 PS 637

## 2023-04-18 PROCEDURE — 74018 RADEX ABDOMEN 1 VIEW: CPT | Mod: 26 | Performed by: RADIOLOGY

## 2023-04-18 PROCEDURE — 36415 COLL VENOUS BLD VENIPUNCTURE: CPT

## 2023-04-18 PROCEDURE — 85014 HEMATOCRIT: CPT

## 2023-04-18 PROCEDURE — 250N000011 HC RX IP 250 OP 636: Performed by: STUDENT IN AN ORGANIZED HEALTH CARE EDUCATION/TRAINING PROGRAM

## 2023-04-18 PROCEDURE — 0JPT00Z REMOVAL OF DRAINAGE DEVICE FROM TRUNK SUBCUTANEOUS TISSUE AND FASCIA, OPEN APPROACH: ICD-10-PCS | Performed by: NEUROLOGICAL SURGERY

## 2023-04-18 PROCEDURE — 87070 CULTURE OTHR SPECIMN AEROBIC: CPT | Performed by: STUDENT IN AN ORGANIZED HEALTH CARE EDUCATION/TRAINING PROGRAM

## 2023-04-18 PROCEDURE — 250N000009 HC RX 250

## 2023-04-18 PROCEDURE — 250N000009 HC RX 250: Performed by: STUDENT IN AN ORGANIZED HEALTH CARE EDUCATION/TRAINING PROGRAM

## 2023-04-18 PROCEDURE — 74018 RADEX ABDOMEN 1 VIEW: CPT

## 2023-04-18 PROCEDURE — 80048 BASIC METABOLIC PNL TOTAL CA: CPT

## 2023-04-18 PROCEDURE — 120N000002 HC R&B MED SURG/OB UMMC

## 2023-04-18 PROCEDURE — 250N000013 HC RX MED GY IP 250 OP 250 PS 637: Performed by: STUDENT IN AN ORGANIZED HEALTH CARE EDUCATION/TRAINING PROGRAM

## 2023-04-18 RX ORDER — CITALOPRAM HYDROBROMIDE 20 MG/1
20 TABLET ORAL DAILY
COMMUNITY

## 2023-04-18 RX ORDER — NALOXONE HYDROCHLORIDE 0.4 MG/ML
0.4 INJECTION, SOLUTION INTRAMUSCULAR; INTRAVENOUS; SUBCUTANEOUS
Status: DISCONTINUED | OUTPATIENT
Start: 2023-04-18 | End: 2023-05-02

## 2023-04-18 RX ORDER — LIDOCAINE HYDROCHLORIDE 10 MG/ML
10 INJECTION, SOLUTION EPIDURAL; INFILTRATION; INTRACAUDAL; PERINEURAL ONCE
Status: COMPLETED | OUTPATIENT
Start: 2023-04-18 | End: 2023-04-18

## 2023-04-18 RX ORDER — FENTANYL CITRATE 50 UG/ML
25-50 INJECTION, SOLUTION INTRAMUSCULAR; INTRAVENOUS
Status: COMPLETED | OUTPATIENT
Start: 2023-04-18 | End: 2023-04-18

## 2023-04-18 RX ORDER — METOPROLOL SUCCINATE 50 MG/1
100 TABLET, EXTENDED RELEASE ORAL DAILY
Status: DISCONTINUED | OUTPATIENT
Start: 2023-04-18 | End: 2023-05-03 | Stop reason: HOSPADM

## 2023-04-18 RX ORDER — LISINOPRIL 40 MG/1
40 TABLET ORAL DAILY
Status: DISCONTINUED | OUTPATIENT
Start: 2023-04-18 | End: 2023-05-03 | Stop reason: HOSPADM

## 2023-04-18 RX ORDER — NALOXONE HYDROCHLORIDE 0.4 MG/ML
0.2 INJECTION, SOLUTION INTRAMUSCULAR; INTRAVENOUS; SUBCUTANEOUS
Status: DISCONTINUED | OUTPATIENT
Start: 2023-04-18 | End: 2023-05-02

## 2023-04-18 RX ORDER — HYDROCHLOROTHIAZIDE 25 MG/1
25 TABLET ORAL DAILY
Status: DISCONTINUED | OUTPATIENT
Start: 2023-04-18 | End: 2023-05-03 | Stop reason: HOSPADM

## 2023-04-18 RX ORDER — LIDOCAINE HYDROCHLORIDE 10 MG/ML
INJECTION, SOLUTION EPIDURAL; INFILTRATION; INTRACAUDAL; PERINEURAL
Status: COMPLETED
Start: 2023-04-18 | End: 2023-04-18

## 2023-04-18 RX ORDER — HYDROMORPHONE HCL IN WATER/PF 6 MG/30 ML
0.2 PATIENT CONTROLLED ANALGESIA SYRINGE INTRAVENOUS
Status: DISCONTINUED | OUTPATIENT
Start: 2023-04-18 | End: 2023-04-19

## 2023-04-18 RX ADMIN — HYDROMORPHONE HYDROCHLORIDE 0.2 MG: 0.2 INJECTION, SOLUTION INTRAMUSCULAR; INTRAVENOUS; SUBCUTANEOUS at 11:31

## 2023-04-18 RX ADMIN — OXYCODONE HYDROCHLORIDE 5 MG: 5 TABLET ORAL at 21:00

## 2023-04-18 RX ADMIN — LISINOPRIL 40 MG: 40 TABLET ORAL at 16:57

## 2023-04-18 RX ADMIN — HYDROMORPHONE HYDROCHLORIDE 0.2 MG: 0.2 INJECTION, SOLUTION INTRAMUSCULAR; INTRAVENOUS; SUBCUTANEOUS at 16:05

## 2023-04-18 RX ADMIN — METOPROLOL SUCCINATE 100 MG: 100 TABLET, EXTENDED RELEASE ORAL at 16:57

## 2023-04-18 RX ADMIN — OXYCODONE HYDROCHLORIDE 5 MG: 5 TABLET ORAL at 04:07

## 2023-04-18 RX ADMIN — LIDOCAINE HYDROCHLORIDE: 10 INJECTION, SOLUTION EPIDURAL; INFILTRATION; INTRACAUDAL; PERINEURAL at 10:30

## 2023-04-18 RX ADMIN — HYDROMORPHONE HYDROCHLORIDE 0.2 MG: 0.2 INJECTION, SOLUTION INTRAMUSCULAR; INTRAVENOUS; SUBCUTANEOUS at 19:04

## 2023-04-18 RX ADMIN — OXYCODONE HYDROCHLORIDE 5 MG: 5 TABLET ORAL at 16:56

## 2023-04-18 RX ADMIN — LIDOCAINE HYDROCHLORIDE 10 ML: 10 INJECTION, SOLUTION EPIDURAL; INFILTRATION; INTRACAUDAL; PERINEURAL at 10:30

## 2023-04-18 RX ADMIN — ACETAMINOPHEN 650 MG: 325 TABLET, FILM COATED ORAL at 04:07

## 2023-04-18 RX ADMIN — HYDROMORPHONE HYDROCHLORIDE 0.2 MG: 0.2 INJECTION, SOLUTION INTRAMUSCULAR; INTRAVENOUS; SUBCUTANEOUS at 23:43

## 2023-04-18 RX ADMIN — HYDROMORPHONE HYDROCHLORIDE 0.2 MG: 0.2 INJECTION, SOLUTION INTRAMUSCULAR; INTRAVENOUS; SUBCUTANEOUS at 07:50

## 2023-04-18 RX ADMIN — ACETAMINOPHEN 650 MG: 325 TABLET, FILM COATED ORAL at 21:01

## 2023-04-18 RX ADMIN — OXYCODONE HYDROCHLORIDE 5 MG: 5 TABLET ORAL at 08:36

## 2023-04-18 RX ADMIN — HYDROMORPHONE HYDROCHLORIDE 0.2 MG: 0.2 INJECTION, SOLUTION INTRAMUSCULAR; INTRAVENOUS; SUBCUTANEOUS at 05:38

## 2023-04-18 RX ADMIN — HYDROMORPHONE HYDROCHLORIDE 0.2 MG: 0.2 INJECTION, SOLUTION INTRAMUSCULAR; INTRAVENOUS; SUBCUTANEOUS at 02:18

## 2023-04-18 RX ADMIN — HYDROCHLOROTHIAZIDE 25 MG: 25 TABLET ORAL at 16:56

## 2023-04-18 ASSESSMENT — ACTIVITIES OF DAILY LIVING (ADL)
ADLS_ACUITY_SCORE: 18

## 2023-04-18 NOTE — PLAN OF CARE
Arrived from: ED @ 2055  Belongings/meds: Clothes and phone with patient   2 RN Skin Assessment Completed by: CT Zamora and Imani RN   Non-intact findings documented (yes/no/NA): Scab where  shunt was tapped. Skin intact otherwise.     Status: Admitted for HA with concern for shunt malfuntion. Hx of idiopathic intracranial hypertension, with  shunt placed in 1992. Shunt was revised in 2015 d/t distal catheter fracture that resulting in a subcutaneous fluid collection on the patient's chest.   Vitals: VSS on RA  Neuros: Intact ex intermittent N/T to hands and feet when HA increases.   IV: PIV SL   Resp/trach: WNL  Diet: NPO since midnight  Bowel status: LBM PTA  : Voiding spontaneously   Pain: Moderate HA controlled with PRN oxycodone  Activity: Independent in room.   Plan: Possible external shunt today

## 2023-04-18 NOTE — CONSULTS
OPHTHALMOLOGY CONSULT NOTE  04/18/23    Patient: Edwin Clements      ASSESSMENT/PLAN:     Edwin Clements is a 48 year old male who presented with headaches.    # Headache  - Patient endorses pressure behind the eyes, but denies changes in vision.   - VA 20/20  - IOP wnl   - Anterior segment was unremarkable  - No evidence of papilledema on exam, the rest of the fundus exam was unremarkable   - Will defer to neurosurgery for further management of headaches    It is our pleasure to participate in this patient's care and treatment. Please contact us with any further questions or concerns.    Discussed with Dr. Johansen who agreed with this assessment and plan.     Flor Ramirez MD     HISTORY OF PRESENTING ILLNESS:     Edwin Clements is a 48 year old male who presented on 4/17 with headaches.      10+ review of systems were otherwise negative except for that which has been stated above.      OCULAR/MEDICAL/SURGICAL HISTORIES:     Past Ocular History:   None    Eye Drops:   None      Past Medical History:  Past Medical History:   Diagnosis Date     Hydrocephalus (H)      Hypertension      Pseudotumor cerebri        Past Surgical History:   Past Surgical History:   Procedure Laterality Date     APPENDECTOMY       HC OR CATH ABLATION NON-CARDIAC ENDOVASCULAR       IMPLANT SHUNT VENTRICULOPERITONEAL       REVISE SHUNT VENTRICULARPERITONEAL Right 3/26/2015    Procedure: REVISE SHUNT VENTRICULARPERITONEAL;  Surgeon: Ulisses Kyle MD;  Location:  OR       Family History:  No relevant FMHX    Social History:  Social History     Socioeconomic History     Marital status:      Spouse name: Not on file     Number of children: Not on file     Years of education: Not on file     Highest education level: Not on file   Occupational History     Not on file   Tobacco Use     Smoking status: Former     Smokeless tobacco: Not on file   Vaping Use     Vaping status: Not on file   Substance and Sexual Activity      Alcohol use: Yes     Drug use: Not on file     Sexual activity: Not on file   Other Topics Concern     Not on file   Social History Narrative     Not on file     Social Determinants of Health     Financial Resource Strain: Not on file   Food Insecurity: Not on file   Transportation Needs: Not on file   Physical Activity: Not on file   Stress: Not on file   Social Connections: Not on file   Intimate Partner Violence: Not on file   Housing Stability: Not on file         EXAMINATION:     Base Eye Exam     Visual Acuity (Snellen - Linear)       Right Left    Near sc 20/20 20/20          Tonometry (Tonopen, 5:15 PM)       Right Left    Pressure 18 17          Pupils       Pupils    Right PERRL    Left PERRL          Visual Fields       Left Right     Full Full          Extraocular Movement       Right Left     Full Full          Dilation     Both eyes: 1.0% Mydriacyl, 2.5% Sanford Synephrine @ 1:15 PM            Slit Lamp and Fundus Exam     External Exam       Right Left    External Normal Normal          Slit Lamp Exam       Right Left    Lids/Lashes Normal Normal    Conjunctiva/Sclera White and quiet White and quiet    Cornea Clear Clear    Anterior Chamber Deep and quiet Deep and quiet    Iris Round and reactive Round and reactive    Lens Clear Clear    Anterior Vitreous Normal Normal          Fundus Exam       Right Left    Disc Normal, no papilledema Normal, no papilledema    C/D Ratio 0.4 0.4    Macula Normal Normal    Vessels Normal Normal    Periphery Normal Normal                Labs/Studies/Imaging Performed  CT HEAD   Impression:  1. Stable position of right ventriculostomy catheter.  2. Stable size of the ventricular system.  3. No acute intracranial findings.    Flor Ramirez M.D.  PGY-2 Resident Physician   Department of Ophthalmology  04/18/23 5:13 PM   Pager: [phone]

## 2023-04-18 NOTE — PLAN OF CARE
Status: Admitted for HA with concern for shunt malfuntion. Hx of idiopathic intracranial hypertension, with  shunt placed in 1992. Shunt was revised in 2015 d/t distal catheter fracture that resulting in a subcutaneous fluid collection on the patient's chest.   Vitals: VSS on RA  Neuros: Intact ex intermittent N/T to hands and feet when HA increases. Photophobic at times  IV: PIV SL   Resp/trach: WNL  Diet: Regular, good intake  Bowel status: LBM PTA  : Voiding   Pain: Moderate HA controlled with PRN oxycodone and Dilaudid  Activity: Independent in room. OK to walk around indep with drain on IV pole.  Skin: Shunt externalized at bedside this am by ANALISA MACHADO. Abd dressing CDI. CSF emptied Q3 per orders. Fluid clear, clamp open.    Plan: Continue to monitor headache pain and drain output.

## 2023-04-18 NOTE — PROCEDURES
PROCEDURE: right sided  Shunt tap.    Preprocedure Diagnosis: R/O  shunt malfunction  Postprocedure Diagnosis: As above.  Performed by: Lucia Vogt MD PhD  Attending: Dr. Martínez   Findings:  1. Opening pressure: 29 mm H2O      Details of the procedure:    Patient was placed in a right lateral position. Using sterile technique area over the shunt valve was prepped thoroughly with iodine and allowed to dry. The area was draped off. The manometer was primed with normal saline and attached to the 3-way connector. A 25 G butterfly needle was placed in the valve reservoir and 4 mls of clear CSF was drawn. The manometer was then connected to the butterfly needle. The distal end was blocked by direct manual pressure occluding the catheter distal to the valve. The opening pressure was 29 cm H2O. Direct manual pressure was used to block the proximal tubing and sluggish flow was noted when the CSF drained down the distal tubing. CSF was obtained and sent for cultures.     Patient tolerated the procedure well.  Results are pending at the time of this note. Dr. Martínez was notified of elevated opening pressure.    Lucia Vogt MD, PhD on 4/17/2023 at 7:04 PM  PGY-1 Neurosurgery

## 2023-04-18 NOTE — PROGRESS NOTES
Mr. Clements appears fairly comfortable although he does have a headache.  The tap helped a little, but as expected the headache is present now.  Shunt likely functional but due to cyst may not be draining well.We will externalize the shunt which should tell us if this is the case.  If cultures are negative, we will replace the distal part and insert into a different location in the abdomen.  If positiive, we will after abx replace the whole shunt.    Exam was non focal. eom intact, face sym, speech nl, no drift, sherri fine.

## 2023-04-18 NOTE — PHARMACY-ADMISSION MEDICATION HISTORY
Pharmacy Intern Admission Medication History    Admission medication history is complete. The information provided in this note is only as accurate as the sources available at the time of the update.    Medication reconciliation/reorder completed by provider prior to medication history? Yes    Information Source(s): Patient and CareEverywhere/SureScripts via in-person    Pertinent Information: Spoke with patient who knew his medications very well. He stated that the amoxicillin and oxycodone were from back in 2015.    Changes made to PTA medication list:    Added: per patient and confirmed with dispense report and Care Everywhere  o Citalopram 20 mg tablet  o Potassium 99mg tablet    Deleted: per patient and confirmed with dispense report and Care Everywhere  o Amoxicillin 875 mg tablet: Take by mouth 2 times daily Take twice a day for 14 days  o Oxycodone 5mg immediate release tablet: Take 1-2 (5-10 mg) tablets by mouth every 6 hours as needed for moderate to severe pain    Changed: None    Medication Affordability: Not assessed       Allergies reviewed with patient and updates made in EHR: yes    Medication History Completed By: Angelic Dinero 4/18/2023 6:16 PM    Prior to Admission medications    Medication Sig Last Dose Taking? Auth Provider Long Term End Date   citalopram (CELEXA) 20 MG tablet Take 20 mg by mouth daily 4/17/2023 at AM Yes Unknown, Entered By History Yes    hydrochlorothiazide (HYDRODIURIL) 25 MG tablet Take 25 mg by mouth daily 4/17/2023 at AM Yes Reported, Patient Yes    lisinopril (PRINIVIL,ZESTRIL) 40 MG tablet Take 40 mg by mouth daily 4/17/2023 at AM Yes Reported, Patient Yes    metoprolol (TOPROL-XL) 100 MG 24 hr tablet Take 100 mg by mouth daily 4/17/2023 at AM Yes Reported, Patient Yes    potassium 99 MG TABS Take 1 tablet by mouth daily 4/17/2023 at AM Yes Unknown, Entered By History     senna-docusate (SENOKOT-S;PERICOLACE) 8.6-50 MG per tablet Take 1 tablet by mouth 2 times  daily  Patient not taking: Reported on 4/18/2023 Not Taking  Jw Jara MD

## 2023-04-18 NOTE — PROCEDURES
Shunt externalization Note     Preoperative diagnosis: Rule out shunt infection  Postoperative diagnosis: Rule out shunt infection    Xrays obtained to localize shunt tubing trajectory above costal line, at the mid sternal line. Site was shaved. Site cleaned with chloraprep.   A total of 6 mL of lidocaine 1% without epinephrine.     A 10 blade was used to make an incision down to the subcutaneous tissue. A right angle clamp was used to elevate the shunt tubing. The shunt tubing capsule was opened with Metzenbaum scissors. The entirety of the distal tubing was then pulled out and about a length of 10cm of the distal tubing was trimmed and sent for cultures. The catheter tip was also swabbed and this was sent for cultures.     The catheter was attached to the Silver drainage system and secured. The incision was closed with a running 3-0 Nylon. A sterile dressing was applied.     Dhara Mckee MD  Neurosurgery PGY3    Please contact neurosurgery resident on call with questions.    Dial * * [phone], enter [phone] when prompted.

## 2023-04-19 ENCOUNTER — APPOINTMENT (OUTPATIENT)
Dept: CT IMAGING | Facility: CLINIC | Age: 48
End: 2023-04-19
Payer: COMMERCIAL

## 2023-04-19 LAB
ANION GAP SERPL CALCULATED.3IONS-SCNC: 14 MMOL/L (ref 7–15)
APPEARANCE CSF: CLEAR
BUN SERPL-MCNC: 10.3 MG/DL (ref 6–20)
CALCIUM SERPL-MCNC: 10 MG/DL (ref 8.6–10)
CHLORIDE SERPL-SCNC: 98 MMOL/L (ref 98–107)
COLOR CSF: COLORLESS
CREAT SERPL-MCNC: 0.6 MG/DL (ref 0.67–1.17)
DEPRECATED HCO3 PLAS-SCNC: 23 MMOL/L (ref 22–29)
ERYTHROCYTE [DISTWIDTH] IN BLOOD BY AUTOMATED COUNT: 14 % (ref 10–15)
GFR SERPL CREATININE-BSD FRML MDRD: >90 ML/MIN/1.73M2
GLUCOSE CSF-MCNC: 67 MG/DL (ref 40–70)
GLUCOSE SERPL-MCNC: 90 MG/DL (ref 70–99)
HCT VFR BLD AUTO: 45.9 % (ref 40–53)
HGB BLD-MCNC: 14.7 G/DL (ref 13.3–17.7)
LYMPH ABN NFR CSF MANUAL: 79 %
MCH RBC QN AUTO: 29 PG (ref 26.5–33)
MCHC RBC AUTO-ENTMCNC: 32 G/DL (ref 31.5–36.5)
MCV RBC AUTO: 91 FL (ref 78–100)
MONOS+MACROS NFR CSF MANUAL: 15 %
NEUTROPHILS NFR CSF MANUAL: 6 %
PLATELET # BLD AUTO: 361 10E3/UL (ref 150–450)
POTASSIUM SERPL-SCNC: 3.9 MMOL/L (ref 3.4–5.3)
PROT CSF-MCNC: 24.1 MG/DL (ref 15–45)
RBC # BLD AUTO: 5.07 10E6/UL (ref 4.4–5.9)
RBC # CSF MANUAL: 4 /UL (ref 0–2)
SODIUM SERPL-SCNC: 135 MMOL/L (ref 136–145)
TUBE # CSF: ABNORMAL
WBC # BLD AUTO: 7.3 10E3/UL (ref 4–11)
WBC # CSF MANUAL: 223 /UL (ref 0–5)

## 2023-04-19 PROCEDURE — 80048 BASIC METABOLIC PNL TOTAL CA: CPT

## 2023-04-19 PROCEDURE — 74176 CT ABD & PELVIS W/O CONTRAST: CPT

## 2023-04-19 PROCEDURE — 250N000013 HC RX MED GY IP 250 OP 250 PS 637

## 2023-04-19 PROCEDURE — 99253 IP/OBS CNSLTJ NEW/EST LOW 45: CPT | Mod: FS | Performed by: PHYSICIAN ASSISTANT

## 2023-04-19 PROCEDURE — 89051 BODY FLUID CELL COUNT: CPT

## 2023-04-19 PROCEDURE — 85014 HEMATOCRIT: CPT

## 2023-04-19 PROCEDURE — 250N000011 HC RX IP 250 OP 636

## 2023-04-19 PROCEDURE — 250N000013 HC RX MED GY IP 250 OP 250 PS 637: Performed by: STUDENT IN AN ORGANIZED HEALTH CARE EDUCATION/TRAINING PROGRAM

## 2023-04-19 PROCEDURE — 258N000003 HC RX IP 258 OP 636: Performed by: NEUROLOGICAL SURGERY

## 2023-04-19 PROCEDURE — 250N000011 HC RX IP 250 OP 636: Performed by: NEUROLOGICAL SURGERY

## 2023-04-19 PROCEDURE — 82945 GLUCOSE OTHER FLUID: CPT

## 2023-04-19 PROCEDURE — 84157 ASSAY OF PROTEIN OTHER: CPT

## 2023-04-19 PROCEDURE — 87075 CULTR BACTERIA EXCEPT BLOOD: CPT

## 2023-04-19 PROCEDURE — 74176 CT ABD & PELVIS W/O CONTRAST: CPT | Mod: 26 | Performed by: RADIOLOGY

## 2023-04-19 PROCEDURE — 120N000002 HC R&B MED SURG/OB UMMC

## 2023-04-19 PROCEDURE — 36415 COLL VENOUS BLD VENIPUNCTURE: CPT

## 2023-04-19 PROCEDURE — 87070 CULTURE OTHR SPECIMN AEROBIC: CPT

## 2023-04-19 RX ORDER — SENNOSIDES 8.6 MG
8.6 TABLET ORAL 2 TIMES DAILY PRN
Status: DISCONTINUED | OUTPATIENT
Start: 2023-04-19 | End: 2023-05-01

## 2023-04-19 RX ORDER — CITALOPRAM HYDROBROMIDE 20 MG/1
20 TABLET ORAL DAILY
Status: DISCONTINUED | OUTPATIENT
Start: 2023-04-19 | End: 2023-05-03 | Stop reason: HOSPADM

## 2023-04-19 RX ORDER — BISACODYL 10 MG
10 SUPPOSITORY, RECTAL RECTAL DAILY PRN
Status: DISCONTINUED | OUTPATIENT
Start: 2023-04-19 | End: 2023-05-02

## 2023-04-19 RX ORDER — POTASSIUM CHLORIDE 1.5 G/1.58G
20 POWDER, FOR SOLUTION ORAL DAILY
Status: DISCONTINUED | OUTPATIENT
Start: 2023-04-19 | End: 2023-04-19

## 2023-04-19 RX ORDER — CEFTRIAXONE 2 G/1
2 INJECTION, POWDER, FOR SOLUTION INTRAMUSCULAR; INTRAVENOUS EVERY 12 HOURS
Status: DISCONTINUED | OUTPATIENT
Start: 2023-04-19 | End: 2023-05-03 | Stop reason: HOSPADM

## 2023-04-19 RX ORDER — POLYETHYLENE GLYCOL 3350 17 G/17G
17 POWDER, FOR SOLUTION ORAL 2 TIMES DAILY PRN
Status: DISCONTINUED | OUTPATIENT
Start: 2023-04-19 | End: 2023-05-01

## 2023-04-19 RX ORDER — MAGNESIUM GLUCONATE 30 MG(550)
99 TABLET ORAL DAILY
Status: DISCONTINUED | OUTPATIENT
Start: 2023-04-19 | End: 2023-05-02

## 2023-04-19 RX ADMIN — CEFTRIAXONE SODIUM 2 G: 2 INJECTION, POWDER, FOR SOLUTION INTRAMUSCULAR; INTRAVENOUS at 14:39

## 2023-04-19 RX ADMIN — METOPROLOL SUCCINATE 100 MG: 100 TABLET, EXTENDED RELEASE ORAL at 08:07

## 2023-04-19 RX ADMIN — LISINOPRIL 40 MG: 40 TABLET ORAL at 08:07

## 2023-04-19 RX ADMIN — HYDROCHLOROTHIAZIDE 25 MG: 25 TABLET ORAL at 08:07

## 2023-04-19 RX ADMIN — VANCOMYCIN HYDROCHLORIDE 1250 MG: 10 INJECTION, POWDER, LYOPHILIZED, FOR SOLUTION INTRAVENOUS at 16:25

## 2023-04-19 RX ADMIN — OXYCODONE HYDROCHLORIDE 5 MG: 5 TABLET ORAL at 04:04

## 2023-04-19 RX ADMIN — CITALOPRAM HYDROBROMIDE 20 MG: 20 TABLET ORAL at 10:26

## 2023-04-19 RX ADMIN — ACETAMINOPHEN 650 MG: 325 TABLET, FILM COATED ORAL at 04:04

## 2023-04-19 RX ADMIN — OXYCODONE HYDROCHLORIDE 5 MG: 5 TABLET ORAL at 08:13

## 2023-04-19 RX ADMIN — ACETAMINOPHEN 650 MG: 325 TABLET, FILM COATED ORAL at 12:57

## 2023-04-19 RX ADMIN — ACETAMINOPHEN 650 MG: 325 TABLET, FILM COATED ORAL at 08:13

## 2023-04-19 RX ADMIN — OXYCODONE HYDROCHLORIDE 5 MG: 5 TABLET ORAL at 12:57

## 2023-04-19 RX ADMIN — Medication 99 MG: at 10:47

## 2023-04-19 RX ADMIN — ACETAMINOPHEN 650 MG: 325 TABLET, FILM COATED ORAL at 18:59

## 2023-04-19 ASSESSMENT — ACTIVITIES OF DAILY LIVING (ADL)
ADLS_ACUITY_SCORE: 18

## 2023-04-19 NOTE — CONSULTS
Interventional Radiology Consult Service Note    IR consulted by Neurosurgery and Infectious Disease for a possible abdominal fluid collection drainage.    This is a 48 y.o. male with idiopathic intracranial HTN s/p  shunt (1992) with revision (2015) who presented two days ago to the ED with a headache and concerns for shunt malfunction. Patient recently saw Neurosurgery on 4/5/23 with prior CT imaging due to weeks of abdominal pain. Per Neurosurgery note, patient was having seizures and headaches prior to shunt placement. Repeat CT imaging today revealed decreased in fluid collection, with no safe window to place an IR drain.    Case and imaging was reviewed with Dr. Gregg Hairston from IR.    Recommendations were reviewed with Dr. Lucia Vogt. No safe window to access fluid collection.    Today:      4/5/23:        Vitals:   /67 (BP Location: Right arm)   Pulse 51   Temp 98  F (36.7  C) (Oral)   Resp 16   Ht 1.829 m (6')   SpO2 98%   BMI 28.05 kg/m      Pertinent Labs:     Lab Results   Component Value Date    WBC 7.3 04/19/2023    WBC 9.7 04/18/2023    WBC 9.0 04/17/2023    WBC 7.6 03/26/2015       Lab Results   Component Value Date    HGB 14.7 04/19/2023    HGB 14.4 04/18/2023    HGB 13.7 04/17/2023    HGB 16.3 03/26/2015       Lab Results   Component Value Date     04/19/2023     04/18/2023     04/17/2023     03/26/2015       Lab Results   Component Value Date    INR 1.02 04/17/2023    INR 1.00 03/26/2015    PTT 30 04/17/2023    PTT 28 03/26/2015       Lab Results   Component Value Date    POTASSIUM 3.9 04/19/2023    POTASSIUM 3.8 03/26/2015        Crystal Jimenez PA-C  Interventional Radiology   Pager: [phone]

## 2023-04-19 NOTE — PLAN OF CARE
Status: POD #1 externalization of shunt. Admitted for HA with concern for shunt malfuntion. Hx of idiopathic intracranial HTN,  shunt ('92), shunt revision ('15)  Vitals: VSS on RA  Neuros: Intact  IV: PIV TKO btwn IV abx   Labs/Electrolytes: WNL, MD paged regarding + CSF cultures    Resp/trach: LS clear, no SOB reported.   Diet: Regular  Bowel status: Last BM PTA, pt declined PRN BM meds  : Voiding spontaneously  Skin: Intact, shunt externalized at upper R abd  Pain: PRN Tylenol and Oxy   Activity: Up ad manpreet   Social: Wife and daughter at bedside   Plan: Continue to monitor and follow POC  Updates this shift: Abx initiated

## 2023-04-19 NOTE — PLAN OF CARE
Holland Clements: Critical lab at 1206, 2+ gram positive bacilli resembling diptheriods. Thanks! Najma [id number]

## 2023-04-19 NOTE — H&P (VIEW-ONLY)
Interventional Radiology Consult Service Note    IR consulted by Neurosurgery and Infectious Disease for a possible abdominal fluid collection drainage.    This is a 48 y.o. male with idiopathic intracranial HTN s/p  shunt (1992) with revision (2015) who presented two days ago to the ED with a headache and concerns for shunt malfunction. Patient recently saw Neurosurgery on 4/5/23 with prior CT imaging due to weeks of abdominal pain. Per Neurosurgery note, patient was having seizures and headaches prior to shunt placement. Repeat CT imaging today revealed decreased in fluid collection, with no safe window to place an IR drain.    Case and imaging was reviewed with Dr. Gregg Hairston from IR.    Recommendations were reviewed with Dr. Lucia Vogt. No safe window to access fluid collection.    Today:      4/5/23:        Vitals:   /67 (BP Location: Right arm)   Pulse 51   Temp 98  F (36.7  C) (Oral)   Resp 16   Ht 1.829 m (6')   SpO2 98%   BMI 28.05 kg/m      Pertinent Labs:     Lab Results   Component Value Date    WBC 7.3 04/19/2023    WBC 9.7 04/18/2023    WBC 9.0 04/17/2023    WBC 7.6 03/26/2015       Lab Results   Component Value Date    HGB 14.7 04/19/2023    HGB 14.4 04/18/2023    HGB 13.7 04/17/2023    HGB 16.3 03/26/2015       Lab Results   Component Value Date     04/19/2023     04/18/2023     04/17/2023     03/26/2015       Lab Results   Component Value Date    INR 1.02 04/17/2023    INR 1.00 03/26/2015    PTT 30 04/17/2023    PTT 28 03/26/2015       Lab Results   Component Value Date    POTASSIUM 3.9 04/19/2023    POTASSIUM 3.8 03/26/2015        Crystal Jimenez PA-C  Interventional Radiology   Pager: [phone]

## 2023-04-19 NOTE — CONSULTS
[facility] CONSULT NOTE     Patient:  Edwin Clements   Date of birth 1975, Medical record number [id number]  Date of Visit:  04/19/2023  Date of Admission: 4/17/2023  Consult Requester:Arnaud Martínez, *          Assessment and Recommendations:   ID Problem List   1. Concern for infected  shunt   -CSF 4/19 with 224 WBC and GS with 2+ GPB resembling diphtheroids amd 4+ WBCs  2. Abdominal Pseudocyst associated with distal  shunt catheter  2. Headaches and blurry vision c/f shunt malfunction  3.  shunt for idiopathic intracranial HTN     RECOMMENDATION:  1. Ideally we would have liked to have a fluid sample from abdominal loculated fluid collection associated with distal end of  shunt, but unfortunately IR has no safe window for sampling. There is concern for superinfection of loculated fluid. Will need serial CT APs to monitor for resolution of fluid collection as we treat patient for infected  shunt.   2. CSF collected 4/19 consistent with infection with 223 WBC and GPBs on gram stain. Culture pending.   3. In light of #2, we recommend shunt removal with EVD placement and IV antibiotics per the IDSA guidelines for Healthcare associated ventriculitis and meningitis (#62). We will use the IDSA guidelines for determining when shunt replacement is possible. This will depend upon the identification of GPBs and culture clearance.  4. Please start CNS dosed antibiotics including: IV Ceftriaxone 2g q12hrs and IV Vancomycin (pharmacy to dose for CNS coverage).   5. Please continue daily CSF sampling to monitor for clearance of infection.   6. We will follow pending cultures and future CSF sampling to help guide time to replacement of  shunt.     ASSESSMENT:  Edwin Clements is a 48 year old male with PMHx significant for idiopathic intracranial HTN s/p  shunt (1992) who presents to the ED with headache and pressure c/f shunt malfunction.     Afebrile and HDS since  "admission. CT head w/o e/o abnormalities. CT CAP at OSH 3/21/23 with \"16.9 cm loculated fluid collection in left mid abdomen associated with tip of  shunt catheter..\" Repeat CT AP 4/5 with decreased size of loculated fluid collection around 14.6cm. Repeat CT AP 4/19 with further decrease in abdominal fluid collection to 10.4 cm. CSF sampled from  shunt near scalp 4/17 which appeared unremarkable (WBC 4, cultures NGTD).  shunt externalized near abdomen 4/18. Repeat sampling 4/19 with concern for infection (, gram stain CSF with GPBs and 4+ WBC).     Initial CSF sampling was reassuring for no infection, however after externalization of the distal portion of the drain ( which was communicating with abdominal fluid collection) CSF appears infected with GPBs on gram stain and 223 WBC. This raises suspicion for superinfection of loculated fluid collection in abdomen which was associated with now externalized  shunt. Since the system is contiguous, removal of  shunt system is necessary for clearance of infection. IDSA guidelines recommend removal of shunt system with placement of EVD and initiation of IV abx for treatment. Further guidance on when it is safe to replace  shunt system depends upon organism ID and clearance of CSF cultures. Recommend CNS dosed Ceftriaxone and Vancomycin while awaiting further ID and cultures.     Recommendations discussed with primary team. ID will continue to follow.     Patient was discussed with Dr. Izaguirre.     Leah Laughlin PA-C  Infectious Diseases  Pager #[phone]          History of Present Illness:   Edwin Clements is a 48 year old male with PMHx significant for idiopathic intracranial HTN s/p  shunt (1992) who presents to the ED with headache and pressure c/f shunt malfunction.     [place] [facility] [place] for findings of pseudocyst in abdomen surrounding distal end of  catheter. Initial plans were for revision of shunt later this " "year, however he presented to the ED 4/17/23 with headaches for several days at home with associated pressure behind eyes and blurry vision- which are similar symptoms to when shunt malfunctioned in the past. Of note, prior to discovery of pseudocyst, patient did have complaints of abdominal pain which he felt like went back as far as last year. He was having a bout of this abdominal pain (localized to RLQ) prior to his trip to [place] at the beginninig of March. It worsened while in [place] with distension of his abdomen and decreased appetite. Has since improved. Denies fever, chills, sweats, n/v/d, or URI symptoms. No rashes or lesions on skin.     Lives in [place], [place]. 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 Denied consuming raw meat. No recent travel outside of trip to [place]- stayed at all inclusive resort and did not leave the resort or consume local foods.          Review of Systems:   CONSTITUTIONAL:  No fevers, chills or night sweats.   EYES: negative for icterus, redness, or purulent drainage.   ENT:  negative for nasal congestion, rhinorrhea, or sore throat.  RESPIRATORY:  negative for cough with sputum and dyspnea.  CARDIOVASCULAR:  negative for chest pain or palpitations.  GASTROINTESTINAL:  negative for nausea, vomiting, diarrhea and constipation.  GENITOURINARY:  negative for dysuria, frequency, or urgency.   HEME:  No easy bruising or bleeding.  INTEGUMENT:  negative for rash and pruritus.  NEURO:  Negative for numbness/tingling in extremities. + headaches and blurry vision.         Past Medical History:     Past Medical History:   Diagnosis Date     Hydrocephalus (H)      Hypertension      Pseudotumor cerebri             Past Surgical History:     Past Surgical History:   Procedure Laterality Date     APPENDECTOMY       [facility] OR CATH ABLATION NON-CARDIAC ENDOVASCULAR       IMPLANT SHUNT VENTRICULOPERITONEAL       REVISE " SHUNT VENTRICULARPERITONEAL Right 3/26/2015    Procedure: REVISE SHUNT VENTRICULARPERITONEAL;  Surgeon: Ulisses Kyle MD;  Location: [facility]            Family History:     No family history on file.         Social History:     Social History     Tobacco Use     Smoking status: Former     Smokeless tobacco: Not on file   Vaping Use     Vaping status: Not on file   Substance Use Topics     Alcohol use: Yes     History   Sexual Activity     Sexual activity: Not on file            Current Medications:       citalopram  20 mg Oral Daily     hydrochlorothiazide  25 mg Oral Daily     lisinopril  40 mg Oral Daily     metoprolol succinate ER  100 mg Oral Daily     potassium chloride  20 mEq Oral Daily            Allergies:   No Known Allergies         Physical Exam:   Vitals were reviewed  Patient Vitals for the past 24 hrs:   BP Temp Temp src Pulse Resp SpO2   04/19/23 0746 102/67 98  F (36.7  C) Oral 51 16 98 %   04/19/23 0400 100/71 97.9  F (36.6  C) Oral 54 14 --   04/18/23 2257 103/71 98.2  F (36.8  C) Oral 69 16 93 %   04/18/23 2005 110/64 98.6  F (37  C) Oral 69 16 95 %   04/18/23 1547 117/73 98.6  F (37  C) Oral 71 16 98 %   04/18/23 1232 117/75 98.2  F (36.8  C) Oral 74 16 98 %       Physical Examination:  Constitutional: Pleasant male seen sitting up in bed, in NAD. Alert and interactive.   HEENT: NCAT, anicteric sclerae, conjunctiva clear. Moist mucous membranes.  Respiratory: Non-labored breathing, on RA. Lungs CTAB.  Cardiovascular: Regular rate and rhythm with no murmur, rub or gallop.  GI: Normoactive BS. Abdomen is soft, non-distended, and non-tender to palpation.   Skin: Warm and dry. No rashes.  Musculoskeletal: Extremities grossly normal. No tenderness or edema present.   Neurologic: A &O x3, speech normal, answering questions appropriately. Moves all extremities spontaneously. Grossly non-focal.  Neuropsychiatric: Mentation and affect normal/appropriate.  VAD: PIV         Laboratory Data:      Inflammatory Markers  No lab results found.    Hematology Studies    Recent Labs   Lab Test 04/19/23  0807 04/18/23  0727 04/17/23  1543 03/26/15  0754   WBC 7.3 9.7 9.0 7.6   HGB 14.7 14.4 13.7 16.3   MCV 91 91 91 89    353 400 308       Metabolic Studies     Recent Labs   Lab Test 04/19/23  0807 04/18/23  0727 04/17/23  1543 03/26/15  0754   * 136 139 136   POTASSIUM 3.9 3.9 3.7 3.8   CHLORIDE 98 100 100 103   CO2 23 22 26 27   BUN 10.3 11.8 14.8 22   CR 0.60* 0.63* 0.67 0.83   GFRESTIMATED >90 >90 >90 >90  Non African American GFR Calc         Hepatic Studies    Recent Labs   Lab Test 04/17/23  1543   BILITOTAL 0.4   ALKPHOS 90   ALBUMIN 4.2   AST 18   ALT 9*       Microbiology:  Culture   Date Value Ref Range Status   04/18/2023 No growth, less than 1 day  Preliminary   04/17/2023 No growth, less than 1 day  Preliminary   04/17/2023 No anaerobic organisms isolated after 1 day  Preliminary       Urine Studies  No lab results found.    Vancomycin Levels  No lab results found.    Invalid input(s): VANCO    Hepatitis B Testing No lab results found.  Hepatitis C Testing   No results found for: HCVAB, HQTG, HCGENO, HCPCR, HQTRNA, HEPRNA  Respiratory Virus Testing    No results found for: RS, FLUAG    IMAGING  CT AP w/o contrast (4/19/23)  IMPRESSION:   1. Externalized ventriculoperitoneal shunt with decreased size of the  central abdominal fluid collection measuring 10.4 cm in greatest  dimension (previously 15.6 cm). No free air.  2. Stable remaining exam.    CT head (4/17/23)   Impression:  1. Stable position of right ventriculostomy catheter.  2. Stable size of the ventricular system.  3. No acute intracranial findings.    CT AP w/o contrast (4/5/23)  IMPRESSION:   1. Decreased size of the loculated fluid collection in the central  abdomen about the ventriculoperitoneal shunt tip measuring 14.6 cm in  greatest dimension (previously 19.9 cm).   2. Stable 9 mm hemorrhagic/proteinaceous renal cyst in  the right lower  pole.  3. Additional incidental/chronic findings as noted above.    OSH imaging:   CT AP w/ contrast (3/21/23)  IMPRESSION:   1.  Moderately large 16.9 cm loculated fluid collection in the left mid abdomen associated with the tip of the ventriculoperitoneal shunt catheter which results in mass effect on small bowel and could be a source of intermittent partial small bowel obstruction with a caliber change at this level without additional significant bowel dilatation is suggested high-grade obstruction. An infected fluid collection would be less likely given the lack inflammatory changes marginating the loculated fluid collection.     2.  8 mm exophytic right renal lesion anteriorly which may be a small hyperdense renal cyst or possibly small solid renal mass. Further evaluation with renal ultrasound is suggested.

## 2023-04-19 NOTE — PROGRESS NOTES
[facility], [place]   04/19/2023  Neurosurgery Progress Note:    Assessment:  Edwin Clements is a 48-year-old man presenting with symptoms concerning for elevated intracranial pressure (progressive HA, blurry vision), with recent CT abdomen showing pseudocyst surrounding the distal catheter.      Patient is POD-1 s/p bedside externalization of the shunt.    Plan:  - Follow CSF and catheter tip cultures  - ID consult for initiation of antibiotics  - Monitor for fevers/chills/ severe headaches  - Serial neuro exams  - Pain control  - HOB > 30 degrees  - Advance diet as tolerates  - Bowel regimen  - PRN antiemetics  - IVF until taking adequate PO  - PT/OT  - SCDs for DVT proph  -----------------------------------  Marcin Gastelum  Neurosurgery Resident PGY2    Interval History: No acute events overnight. Headaches fluctuant.    Objective:   Temp:  [97.7  F (36.5  C)-98.6  F (37  C)] 98  F (36.7  C)  Pulse:  [51-74] 51  Resp:  [14-16] 16  BP: (100-117)/(64-77) 102/67  SpO2:  [93 %-98 %] 98 %  I/O last 3 completed shifts:  In: -   Out: 250 [Other:250]    Gen: Appears comfortable, NAD  Neurologic:  - Alert & Oriented to person, place, time, and situation  - Follows commands briskly  - Speech fluent, spontaneous. No aphasia or dysarthria.  - No gaze preference. No apparent hemineglect.  - PERRL, EOMI  - Face symmetric with sensation intact to light touch  - Palate elevates symmetrically, uvula midline, tongue protrudes midline  - Trapezii muscles 5/5 bilaterally  - No pronator drift     Del Tr Bi WE WF Gr   R 5 5 5 5 5 5   L 5 5 5 5 5 5    HF KE KF DF PF EHL   R 5 5 5 5 5 5   L 5 5 5 5 5 5     Reflexes 2+ throughout    Sensation intact and symmetric to light touch throughout    LABS:  Recent Labs   Lab 04/18/23  0727 04/17/23  1543    139   POTASSIUM 3.9 3.7   CHLORIDE 100 100   CO2 22 26   ANIONGAP 14 13   * 96   BUN 11.8 14.8   CR 0.63* 0.67   NIKO 9.4 9.5       Recent Labs   Lab  04/18/23  0727   WBC 9.7   RBC 4.88   HGB 14.4   HCT 44.3   MCV 91   MCH 29.5   MCHC 32.5   RDW 13.9          IMAGING:  Recent Results (from the past 24 hour(s))   XR Abdomen Port 1 View    Narrative    XR ABDOMEN PORT 1 VIEW 4/18/2023 11:05 AM      HISTORY: Localizing for procedure    COMPARISON: CT abdomen and pelvis 4/5/2023    FINDINGS:   Supine views of the abdomen and upper pelvis. Partially visualized  ventriculoperitoneal shunt tubing with tip in the left hemiabdomen.  External forceps tip overlying the liver and then subsequently the  ventriculoperitoneal shunt tubing on 10:52 image, at the level of the  posterior right 10th rib. Nonobstructive bowel gas pattern. No  pneumatosis.        Impression    IMPRESSION:   External forceps tip overlying the ventriculoperitoneal shunt tubing  on the 10:52 AM image.    I have personally reviewed the examination and initial interpretation  and I agree with the findings.    JOSE QUEEN MD         SYSTEM ID:  [id number]

## 2023-04-19 NOTE — PLAN OF CARE
Status: Admitted for HA with concern for shunt malfuntion. Hx of idiopathic intracranial hypertension, with  shunt placed in 1992. Shunt was revised in 2015 d/t distal catheter fracture that resulting in a subcutaneous fluid collection on the patient's chest.   Vitals: VSS on RA  Neuros: Intact ex intermittent N/T to hands and feet when HA increases. Photophobic at times.   IV: PIV SL   Resp/trach: WNL  Diet: Regular   Bowel status: LBM PTA  : Voiding spontaneously   Skin: Shunt externalized. Abd dressing CDI. CSF emptied Q3 hrs per order. Fluid clear, clamp is open.   Pain: Moderate HA controlled with PRN oxycodone, tylenol, and dilaudid.   Activity: Independent in room.   Plan: Continue to monitor pain and drain output.

## 2023-04-19 NOTE — PHARMACY-VANCOMYCIN DOSING SERVICE
Pharmacy Vancomycin Initial Note  Date of Service 2023  Patient's  1975  48 year old, male    Indication: CNS/infected hardware  Current estimated CrCl = Estimated Creatinine Clearance: 181 mL/min (A) (based on SCr of 0.6 mg/dL (L)).    Creatinine for last 3 days  2023:  3:43 PM Creatinine 0.67 mg/dL  2023:  7:27 AM Creatinine 0.63 mg/dL  2023:  8:07 AM Creatinine 0.60 mg/dL    Recent Vancomycin Level(s) for last 3 days  No results found for requested labs within last 3 days.      Vancomycin IV Administrations (past 72 hours)      No vancomycin orders with administrations in past 72 hours.                Nephrotoxins and other renal medications (From now, onward)    Start     Dose/Rate Route Frequency Ordered Stop    23 1430  vancomycin (VANCOCIN) 1,250 mg in 0.9% NaCl 250 mL intermittent infusion         1,250 mg  over 90 Minutes Intravenous EVERY 8 HOURS 23 1421      23 1630  lisinopril (ZESTRIL) tablet 40 mg        Note to Pharmacy: PTA Sig:Take 40 mg by mouth daily      40 mg Oral DAILY 23 1620            Contrast Orders - past 72 hours (72h ago, onward)    None              Plan:  1. Start vancomycin  1250 mg IV q8h.   2. Vancomycin monitoring method: Trough (Method 1 = dosing nomogram)  3. Vancomycin therapeutic monitoring goal: 15-20 mg/L  4. Pharmacy will check vancomycin levels as appropriate in 1-3 Days.      Tameka Cosby [facility]

## 2023-04-20 LAB
ANION GAP SERPL CALCULATED.3IONS-SCNC: 12 MMOL/L (ref 7–15)
APPEARANCE CSF: CLEAR
APPEARANCE CSF: CLEAR
BACTERIA SPEC CULT: NO GROWTH
BUN SERPL-MCNC: 11.4 MG/DL (ref 6–20)
CALCIUM SERPL-MCNC: 9.8 MG/DL (ref 8.6–10)
CHLORIDE SERPL-SCNC: 100 MMOL/L (ref 98–107)
COLOR CSF: COLORLESS
COLOR CSF: COLORLESS
CREAT SERPL-MCNC: 0.62 MG/DL (ref 0.67–1.17)
DEPRECATED HCO3 PLAS-SCNC: 22 MMOL/L (ref 22–29)
ERYTHROCYTE [DISTWIDTH] IN BLOOD BY AUTOMATED COUNT: 13.7 % (ref 10–15)
GFR SERPL CREATININE-BSD FRML MDRD: >90 ML/MIN/1.73M2
GLUCOSE CSF-MCNC: 63 MG/DL (ref 40–70)
GLUCOSE CSF-MCNC: 69 MG/DL (ref 40–70)
GLUCOSE SERPL-MCNC: 104 MG/DL (ref 70–99)
GRAM STAIN RESULT: NORMAL
HCT VFR BLD AUTO: 45.6 % (ref 40–53)
HGB BLD-MCNC: 14.8 G/DL (ref 13.3–17.7)
LYMPH ABN NFR CSF MANUAL: 78 %
LYMPH ABN NFR CSF MANUAL: 85 %
MCH RBC QN AUTO: 28.9 PG (ref 26.5–33)
MCHC RBC AUTO-ENTMCNC: 32.5 G/DL (ref 31.5–36.5)
MCV RBC AUTO: 89 FL (ref 78–100)
MONOS+MACROS NFR CSF MANUAL: 13 %
MONOS+MACROS NFR CSF MANUAL: 19 %
NEUTROPHILS NFR CSF MANUAL: 2 %
NEUTROPHILS NFR CSF MANUAL: 3 %
PLATELET # BLD AUTO: 403 10E3/UL (ref 150–450)
POTASSIUM SERPL-SCNC: 4.3 MMOL/L (ref 3.4–5.3)
PROT CSF-MCNC: 21.7 MG/DL (ref 15–45)
PROT CSF-MCNC: 27.5 MG/DL (ref 15–45)
RBC # BLD AUTO: 5.12 10E6/UL (ref 4.4–5.9)
RBC # CSF MANUAL: 0 /UL (ref 0–2)
RBC # CSF MANUAL: 4 /UL (ref 0–2)
SODIUM SERPL-SCNC: 134 MMOL/L (ref 136–145)
TUBE # CSF: ABNORMAL
TUBE # CSF: ABNORMAL
WBC # BLD AUTO: 8.1 10E3/UL (ref 4–11)
WBC # CSF MANUAL: 123 /UL (ref 0–5)
WBC # CSF MANUAL: 206 /UL (ref 0–5)

## 2023-04-20 PROCEDURE — 85027 COMPLETE CBC AUTOMATED: CPT

## 2023-04-20 PROCEDURE — 89051 BODY FLUID CELL COUNT: CPT | Performed by: NURSE PRACTITIONER

## 2023-04-20 PROCEDURE — 82945 GLUCOSE OTHER FLUID: CPT | Performed by: NURSE PRACTITIONER

## 2023-04-20 PROCEDURE — 99232 SBSQ HOSP IP/OBS MODERATE 35: CPT | Performed by: NURSE PRACTITIONER

## 2023-04-20 PROCEDURE — 87075 CULTR BACTERIA EXCEPT BLOOD: CPT | Performed by: NURSE PRACTITIONER

## 2023-04-20 PROCEDURE — 250N000011 HC RX IP 250 OP 636

## 2023-04-20 PROCEDURE — 89051 BODY FLUID CELL COUNT: CPT

## 2023-04-20 PROCEDURE — 250N000011 HC RX IP 250 OP 636: Performed by: NEUROLOGICAL SURGERY

## 2023-04-20 PROCEDURE — 258N000003 HC RX IP 258 OP 636: Performed by: NEUROLOGICAL SURGERY

## 2023-04-20 PROCEDURE — 82945 GLUCOSE OTHER FLUID: CPT

## 2023-04-20 PROCEDURE — 999N000128 HC STATISTIC PERIPHERAL IV START W/O US GUIDANCE

## 2023-04-20 PROCEDURE — 250N000013 HC RX MED GY IP 250 OP 250 PS 637: Performed by: STUDENT IN AN ORGANIZED HEALTH CARE EDUCATION/TRAINING PROGRAM

## 2023-04-20 PROCEDURE — 250N000013 HC RX MED GY IP 250 OP 250 PS 637

## 2023-04-20 PROCEDURE — 87076 CULTURE ANAEROBE IDENT EACH: CPT | Performed by: STUDENT IN AN ORGANIZED HEALTH CARE EDUCATION/TRAINING PROGRAM

## 2023-04-20 PROCEDURE — 80048 BASIC METABOLIC PNL TOTAL CA: CPT

## 2023-04-20 PROCEDURE — 36415 COLL VENOUS BLD VENIPUNCTURE: CPT

## 2023-04-20 PROCEDURE — 99232 SBSQ HOSP IP/OBS MODERATE 35: CPT | Performed by: PHYSICIAN ASSISTANT

## 2023-04-20 PROCEDURE — 120N000002 HC R&B MED SURG/OB UMMC

## 2023-04-20 PROCEDURE — 87075 CULTR BACTERIA EXCEPT BLOOD: CPT

## 2023-04-20 PROCEDURE — 250N000013 HC RX MED GY IP 250 OP 250 PS 637: Performed by: NURSE PRACTITIONER

## 2023-04-20 PROCEDURE — 87205 SMEAR GRAM STAIN: CPT | Performed by: NURSE PRACTITIONER

## 2023-04-20 PROCEDURE — 87070 CULTURE OTHR SPECIMN AEROBIC: CPT

## 2023-04-20 PROCEDURE — 84157 ASSAY OF PROTEIN OTHER: CPT | Performed by: NURSE PRACTITIONER

## 2023-04-20 PROCEDURE — 84157 ASSAY OF PROTEIN OTHER: CPT

## 2023-04-20 RX ORDER — LANOLIN ALCOHOL/MO/W.PET/CERES
3 CREAM (GRAM) TOPICAL
Status: DISCONTINUED | OUTPATIENT
Start: 2023-04-20 | End: 2023-05-03 | Stop reason: HOSPADM

## 2023-04-20 RX ADMIN — ACETAMINOPHEN 650 MG: 325 TABLET, FILM COATED ORAL at 19:03

## 2023-04-20 RX ADMIN — ACETAMINOPHEN 650 MG: 325 TABLET, FILM COATED ORAL at 09:04

## 2023-04-20 RX ADMIN — CITALOPRAM HYDROBROMIDE 20 MG: 20 TABLET ORAL at 07:36

## 2023-04-20 RX ADMIN — Medication 3 MG: at 21:09

## 2023-04-20 RX ADMIN — SENNOSIDES 8.6 MG: 8.6 TABLET, FILM COATED ORAL at 07:45

## 2023-04-20 RX ADMIN — CEFTRIAXONE SODIUM 2 G: 2 INJECTION, POWDER, FOR SOLUTION INTRAMUSCULAR; INTRAVENOUS at 15:34

## 2023-04-20 RX ADMIN — HYDROCHLOROTHIAZIDE 25 MG: 25 TABLET ORAL at 07:36

## 2023-04-20 RX ADMIN — VANCOMYCIN HYDROCHLORIDE 1250 MG: 10 INJECTION, POWDER, LYOPHILIZED, FOR SOLUTION INTRAVENOUS at 16:30

## 2023-04-20 RX ADMIN — POLYETHYLENE GLYCOL 3350 17 G: 17 POWDER, FOR SOLUTION ORAL at 07:45

## 2023-04-20 RX ADMIN — OXYCODONE HYDROCHLORIDE 5 MG: 5 TABLET ORAL at 09:04

## 2023-04-20 RX ADMIN — LISINOPRIL 40 MG: 40 TABLET ORAL at 07:36

## 2023-04-20 RX ADMIN — VANCOMYCIN HYDROCHLORIDE 1250 MG: 10 INJECTION, POWDER, LYOPHILIZED, FOR SOLUTION INTRAVENOUS at 08:57

## 2023-04-20 RX ADMIN — ACETAMINOPHEN 650 MG: 325 TABLET, FILM COATED ORAL at 00:34

## 2023-04-20 RX ADMIN — METOPROLOL SUCCINATE 100 MG: 100 TABLET, EXTENDED RELEASE ORAL at 07:36

## 2023-04-20 RX ADMIN — CEFTRIAXONE SODIUM 2 G: 2 INJECTION, POWDER, FOR SOLUTION INTRAMUSCULAR; INTRAVENOUS at 04:20

## 2023-04-20 RX ADMIN — Medication 99 MG: at 07:36

## 2023-04-20 RX ADMIN — OXYCODONE HYDROCHLORIDE 5 MG: 5 TABLET ORAL at 00:34

## 2023-04-20 RX ADMIN — VANCOMYCIN HYDROCHLORIDE 1250 MG: 10 INJECTION, POWDER, LYOPHILIZED, FOR SOLUTION INTRAVENOUS at 01:15

## 2023-04-20 ASSESSMENT — ACTIVITIES OF DAILY LIVING (ADL)
ADLS_ACUITY_SCORE: 18

## 2023-04-20 NOTE — PROGRESS NOTES
"  [facility] PROGRESS NOTE     Patient:  Edwin Clements   Date of birth 1975, Medical record number [id number]  Date of Visit:  04/20/2023  Date of Admission: 4/17/2023  Consult Requester:Arnaud Martínez, *          Assessment and Plan:   ID Problem List   1. Cutibacterium acnes  shunt infection               -CSF cultures 4/17 positive, CSF 4/19 with 224 WBC and GS with 2+ GPB resembling diphtheroids amd 4+ WBCs  2. Abdominal Pseudocyst associated with distal  shunt catheter  2. Headaches and blurry vision c/f shunt malfunction  3.  shunt for idiopathic intracranial HTN      RECOMMENDATION:  1. CSF continues to be positive and concerning for infected  shunt.   2. Recommending prompt removal of shunt with EVD placement and IV antibiotics per the IDSA guidelines for Healthcare associated ventriculitis and meningitis (#62). Start date of antibiotics will be from shunt removal.  3. We will use the IDSA guidelines for determining when shunt replacement is possible- dependent on time from shunt removal and clearance of CNS infection.  4. Continue IV Ceftriaxone 2g q12hrs and IV Vancomycin (pharmacy to dose for CNS coverage).   5. Please continue daily CSF sampling to monitor for clearance of infection.   6. We will follow pending cultures and future CSF sampling to help guide time to replacement of  shunt.      ASSESSMENT:  Edwin Clements is a 48 year old male with PMHx significant for idiopathic intracranial HTN s/p  shunt (1992) who presents to the ED with headache and pressure c/f shunt malfunction.      Afebrile and HDS since admission. CT head w/o e/o abnormalities. CT CAP at OSH 3/21/23 with \"16.9 cm loculated fluid collection in left mid abdomen associated with tip of  shunt catheter..\" Repeat CT AP 4/5 with decreased size of loculated fluid collection around 14.6cm. Repeat CT AP 4/19 with further decrease in abdominal fluid collection to 10.4 cm. IR evaluated " as to whether abdominal fluid collection could be sampled and unfortunately there was no safe window of accesss for sampling or drain placement. Will need serial CT scans to monitor for resolution.     CSF sampled from  shunt near scalp 4/17 which appeared unremarkable (WBC 4, cultures NGTD), however cultures positive for growth of C acnes.  shunt externalized near abdomen 4/18. Repeat sampling 4/19 (, GS + GPBs) and 20th (cell count pending, GS with GPBs) concerning for infection. There is now continuous evidence on CSF of shunt infection with Cutibacterium acnes. Since the system is contiguous, removal of  shunt system is necessary for clearance of infection. C acnes is known to form biofilms on hardware. IDSA guidelines recommend removal of shunt system with placement of EVD and initiation of IV abx for treatment. Start date of antibiotics will be from time of shunt removal (soucre control) and duration of antibiotics prior to shunt replacement will depend on culture clearance as well as time from removal. It is possible that replacement could be as soon as 7 days from removal if cultures clear. On CNS dosed Ceftriaxone and Vancomycin for treatment.     Recommendations discussed with primary team. ID will continue to follow.     40 MINUTES SPENT BY ME on the date of service doing chart review, history, exam, documentation & further activities per the note.    Leah Laughlin PA-C  Infectious Diseases  Pager #[phone]          Interim History and Events:   Edwin is feeling better today. Headache improved. No new symptoms endorsed. Appetite okay. Discussed findings of infected  shunt and antibiotics needed while awaiting plan from [facility].         HPI:   Edwin Clements is a 48 year old male with PMHx significant for idiopathic intracranial HTN s/p  shunt (1992) who presents to the ED with headache and pressure c/f shunt malfunction.      Recently seen by [facility] outpatient ~ 1 week prior for findings  "of pseudocyst in abdomen surrounding distal end of  catheter. Initial plans were for revision of shunt later this year, however he presented to the ED 4/17/23 with headaches for several days at home with associated pressure behind eyes and blurry vision- which are similar symptoms to when shunt malfunctioned in the past. Of note, prior to discovery of pseudocyst, patient did have complaints of abdominal pain which he felt like went back as far as last year. He was having a bout of this abdominal pain (localized to RLQ) prior to his trip to [place] at the beginninig of March. It worsened while in [place] with distension of his abdomen and decreased appetite. Has since improved. Denies fever, chills, sweats, n/v/d, or URI symptoms. No rashes or lesions on skin.      Lives in [place], [place]. 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 Denied consuming raw meat. No recent travel outside of trip to [place]- stayed at [place] inclusive resort and did not leave the resort or consume local foods.            ROS:   -Focused 5 point ROS completed, pertinent positives and negatives listed above.      Physical Examination:  Temp: 98.3  F (36.8  C) Temp src: Oral BP: 106/72 Pulse: 62   Resp: 16 SpO2: 96 % O2 Device: None (Room air)      There were no vitals filed for this visit.    Constitutional: Pleasant male seen sitting up in bed, in NAD. Alert and interactive.   HEENT: NCAT, anicteric sclerae, conjunctiva clear. Moist mucous membranes.  Respiratory: Non-labored breathing on RA. Lungs CTAB.  Cardiovascular: Regular rate and rhythm with no murmur, rub or gallop.  GI: Normoactive BS. Abdomen is soft, non-distended.  Skin: Warm and dry. No rashes.  Musculoskeletal: Extremities grossly normal.  Neurologic: A &O x3, speech normal, answering questions appropriately. Moves all extremities spontaneously. Grossly non-focal.  Neuropsychiatric: Mentation and affect " normal/appropriate.  VAD: PIV      Medications:    cefTRIAXone  2 g Intravenous Q12H     citalopram  20 mg Oral Daily     hydrochlorothiazide  25 mg Oral Daily     lisinopril  40 mg Oral Daily     metoprolol succinate ER  100 mg Oral Daily     Potassium Gluconate  99 mg Oral Daily     vancomycin  1,250 mg Intravenous Q8H       Infusions/Drips:      Laboratory Data:   No results found for: ACD4    Inflammatory Markers    No lab results found.    Metabolic Studies       Recent Labs   Lab Test 04/20/23  1104 04/19/23  0807 04/18/23  0727 04/17/23  1543 03/26/15  0754   * 135* 136 139 136   POTASSIUM 4.3 3.9 3.9 3.7 3.8   CHLORIDE 100 98 100 100 103   CO2 22 23 22 26 27   ANIONGAP 12 14 14 13 6   BUN 11.4 10.3 11.8 14.8 22   CR 0.62* 0.60* 0.63* 0.67 0.83   GFRESTIMATED >90 >90 >90 >90 >90  Non African American GFR Calc     * 90 112* 96 87   NIKO 9.8 10.0 9.4 9.5 9.3       Hepatic Studies    Recent Labs   Lab Test 04/17/23  1543   BILITOTAL 0.4   ALKPHOS 90   ALBUMIN 4.2   AST 18   ALT 9*       Pancreatitis testing    No lab results found.    Hematology Studies      Recent Labs   Lab Test 04/20/23  1104 04/19/23  0807 04/18/23  0727 04/17/23  1543 03/26/15  0754   WBC 8.1 7.3 9.7 9.0 7.6   HGB 14.8 14.7 14.4 13.7 16.3   HCT 45.6 45.9 44.3 42.0 46.2    361 353 400 308     CSF testing     Recent Labs   Lab Test 04/20/23  1150 04/20/23  0953 04/19/23  0934 04/17/23  1831   CWBC  --   --  223* 4   CNEU  --   --  6  --    CLYM  --   --  79  --    CMONO  --   --  15  --    CRBC  --   --  4* 1   CGLU 63 69 67 57   CTP 27.5 21.7 24.1 7.7*         Microbiology:  Culture   Date Value Ref Range Status   04/19/2023 No anaerobic organisms isolated after 1 day  Preliminary   04/19/2023 No growth, less than 1 day  Preliminary   04/18/2023 No Growth  Final   04/17/2023 Culture in progress  Preliminary   04/17/2023 (AA)  Preliminary    Isolated in broth only Gram positive bacilli, resembling diphtheroids     Comment:      On day 2 of incubation   04/17/2023 Culture in progress  Preliminary   04/17/2023 1+ Cutibacterium (Propionibacterium) acnes (AA)  Preliminary       Last check of C difficile  No results found for: CDBPCT    Imaging:  IMAGING  CT AP w/o contrast (4/19/23)  IMPRESSION:   1. Externalized ventriculoperitoneal shunt with decreased size of the  central abdominal fluid collection measuring 10.4 cm in greatest  dimension (previously 15.6 cm). No free air.  2. Stable remaining exam.     CT head (4/17/23)   Impression:  1. Stable position of right ventriculostomy catheter.  2. Stable size of the ventricular system.  3. No acute intracranial findings.     CT AP w/o contrast (4/5/23)  IMPRESSION:   1. Decreased size of the loculated fluid collection in the central  abdomen about the ventriculoperitoneal shunt tip measuring 14.6 cm in  greatest dimension (previously 19.9 cm).   2. Stable 9 mm hemorrhagic/proteinaceous renal cyst in the right lower  pole.  3. Additional incidental/chronic findings as noted above.     OSH imaging:   CT AP w/ contrast (3/21/23)  IMPRESSION:   1.  Moderately large 16.9 cm loculated fluid collection in the left mid abdomen associated with the tip of the ventriculoperitoneal shunt catheter which results in mass effect on small bowel and could be a source of intermittent partial small bowel obstruction with a caliber change at this level without additional significant bowel dilatation is suggested high-grade obstruction. An infected fluid collection would be less likely given the lack inflammatory changes marginating the loculated fluid collection.     2.  8 mm exophytic right renal lesion anteriorly which may be a small hyperdense renal cyst or possibly small solid renal mass. Further evaluation with renal ultrasound is suggested.

## 2023-04-20 NOTE — PLAN OF CARE
Critical Lab at 1:35 pm   CSF 4/20 1150: gram stain showing 1+ gram positive bacilli resembling diphtheroid, and 3+ WBC. Najma [phone]

## 2023-04-20 NOTE — PROGRESS NOTES
[facility], [place]   04/20/2023  Neurosurgery Progress Note:    Assessment:  Edwin Clements is a 48-year-old man presenting with symptoms concerning for elevated intracranial pressure (progressive HA, blurry vision), with recent CT abdomen showing pseudocyst surrounding the distal catheter.      Patient is POD-2 s/p bedside externalization of the shunt.    Plan:  - Follow CSF and catheter tip cultures  - ID consult -appreciate recommendations  - Monitor for fevers/chills/ severe headaches  - Serial neuro exams  - Pain control  - HOB > 30 degrees  - Advance diet as tolerates  - Bowel regimen  - PRN antiemetics  - IVF until taking adequate PO  - PT/OT  - SCDs for DVT proph  -----------------------------------  TEREZA Vazquez, CNP  Neurosurgery  Pager [phone]      Interval History: No acute events overnights.  Reports occasional mild headaches.  Denies fever, chills,, muscle aches or vision changes. CSF sent again today.     Objective:   Temp:  [98  F (36.7  C)-98.3  F (36.8  C)] 98.3  F (36.8  C)  Pulse:  [58-67] 62  Resp:  [16-18] 16  BP: (100-111)/(65-74) 106/72  SpO2:  [96 %-98 %] 96 %  I/O last 3 completed shifts:  In: -   Out: 558 [Other:558]    Gen: Appears comfortable, NAD  Neurologic:  - Alert & Oriented to person, place, time, and situation  - Follows commands briskly  - Speech fluent, spontaneous. No aphasia or dysarthria.  - No gaze preference. No apparent hemineglect.  - PERRL, EOMI  - Face symmetric with sensation intact to light touch  - Palate elevates symmetrically, uvula midline, tongue protrudes midline  - Trapezii muscles 5/5 bilaterally  - No pronator drift     Del Tr Bi WE WF Gr   R 5 5 5 5 5 5   L 5 5 5 5 5 5    HF KE KF DF PF EHL   R 5 5 5 5 5 5   L 5 5 5 5 5 5     Reflexes 2+ throughout    Sensation intact and symmetric to light touch throughout    LABS:  Recent Labs   Lab 04/19/23  0807 04/18/23  0727 04/17/23  1543   * 136 139   POTASSIUM 3.9 3.9 3.7    CHLORIDE 98 100 100   CO2 23 22 26   ANIONGAP 14 14 13   GLC 90 112* 96   BUN 10.3 11.8 14.8   CR 0.60* 0.63* 0.67   NIKO 10.0 9.4 9.5       Recent Labs   Lab 04/19/23  0807   WBC 7.3   RBC 5.07   HGB 14.7   HCT 45.9   MCV 91   MCH 29.0   MCHC 32.0   RDW 14.0          IMAGING:  Recent Results (from the past 24 hour(s))   CT Abdomen Pelvis w/o Contrast    Narrative    EXAMINATION: CT ABDOMEN PELVIS W/O CONTRAST, 4/19/2023 11:03 AM    TECHNIQUE:  Helical CT images from the lung bases through the  symphysis pubis were obtained without contrast.  Coronal and sagittal  reformatted images were generated at a workstation for further  assessment.    COMPARISON: Abdominal radiograph 4/18/2023, CT abdomen and pelvis  4/5/2023    HISTORY: f/u loculated fluid collection s/p shunt externalization    FINDINGS:    Lines and tubes: Externalized ventriculoperitoneal shunt.    Lower thorax: Dependent atelectasis. No focal consolidation or pleural  effusion. Heart size is within normal limits. No pericardial effusion.    Liver: No suspicious lesion.  Gallbladder: No cholelithiasis or evidence of acute cholecystitis. No  intra- or extra-hepatic biliary ductal dilatation.  Spleen: Normal parenchyma. Small splenule in the left upper quadrant.  Pancreas: Unremarkable.  Adrenal glands: No discrete nodules.  Kidneys: Stable hemorrhagic versus proteinaceous cyst in the right  lower pole, otherwise within normal limits.  Bladder / Pelvic organs: Bladder is within normal limits. No pelvic  mass. Small hydroceles.  Bowel: No evidence of obstruction. The appendix is absent. Colonic  diverticulosis without evidence of acute diverticulitis.  Lymph nodes: No new suspicious lymphadenopathy.  Peritoneum / Retroperitoneum: No pneumoperitoneum. Decreased size of  the loculated fluid collection in the central abdomen measuring 6.0 x  6.7 x 10.4 cm (previously 7.5 x 2.6 x 15.6 cm when measured  similarly). No new organized fluid collections.  Small to moderate  fat-containing left hernia. Small fat containing right inguinal  hernia.   Abdominal vasculature: Normal caliber abdominal aorta. Circumaortic  left renal vein.    Bones and soft tissues: Degenerative changes of the visualized spine.  No acute osseous abnormalities or suspicious bony lesions. Trace  subcutaneous emphysema in the right upper quadrant related to shunt  externalization.      Impression    IMPRESSION:   1. Externalized ventriculoperitoneal shunt with decreased size of the  central abdominal fluid collection measuring 10.4 cm in greatest  dimension (previously 15.6 cm). No free air.  2. Stable remaining exam.    I have personally reviewed the examination and initial interpretation  and I agree with the findings.    ROBERTA SUAREZ,          SYSTEM ID:  [id number]

## 2023-04-20 NOTE — PLAN OF CARE
Status: POD #2 externalization of shunt. Admitted for HA with concern for shunt malfuntion. Hx of idiopathic intracranial HTN,  shunt ('92), shunt revision ('15)  Vitals: VSS  Neuros: Intact.  IV: PIV infusing TKO inbetween IV abx  Labs/Electrolytes: WNL  Resp/trach: WNL, RA.  Diet: Regular  Bowel status: LBM 4/19  : Voiding spon  Skin: Intact, shunt site WNL.   Pain: Tylenol given for back pain 2/10.  Activity: Up ad manpreet  Plan: Continue current POC

## 2023-04-20 NOTE — PLAN OF CARE
Status: POD #2 externalization of shunt. Admitted for HA with concern for shunt malfuntion. Hx of idiopathic intracranial HTN,  shunt ('92), shunt revision ('15).  Vitals: VSS on RA ex intermittently bradycardic overnight.  Neuros: Intact. No photophobia or N/T noted this shift.  IV: PIV SL inbetween IV abx.   Labs/Electrolytes: Reviewed. CSF cultures +, team aware.  Resp/trach: on RA. Denies SOB.   Diet: Regular, good PO.  Bowel status: BS+, one BM this shift. LBM 4/19.  : Voiding spontaneously  Skin:  shunt externalized at upper R abdomen, dressing CDI.  Pain: Abdominal soreness where shunt externalized managed with PRN oxycodone and tylenol.  Activity: Up ad manpreet, steady on feet.   Social: Patient resting in between cares.  Plan: Continue to monitor and follow POC. Empty  shunt q3hr. Monitor for fevers/chills/severe HA.

## 2023-04-20 NOTE — PLAN OF CARE
MD paged at [phone]:  [address] St[address] AREVALO: Critical result from lab, CSF drawn on 4/17, aerobic bacteria, gram (+) baccilli resembling diphtheroid, in broth only. Thanks! Najma [phone]

## 2023-04-20 NOTE — PLAN OF CARE
Status: POD #2 externalization of shunt. Admitted for HA with concern for shunt malfuntion. Hx of idiopathic intracranial HTN,  shunt ('92), shunt revision ('15)  Vitals: VSS on RA  Neuros: Intact  IV: PIV TKO btwn IV abx   Labs/Electrolytes: WNL, MD paged regarding + CSF cultures    Resp/trach: LS clear, no SOB reported.   Diet: Regular  Bowel status: Last BM 4/19  : Voiding spontaneously  Skin: Intact, shunt externalized at upper R abd  Pain: PRN Tylenol and Oxy   Activity: Up ad manpreet   Plan: Continue to monitor and follow POC

## 2023-04-20 NOTE — PLAN OF CARE
MD paged at 1140:   Starr 217: Critical Lab: CSF from  shunt collected 4/20 1+ gram positive bacilli resembling diphtheroid, also showing 4+ WBC. Najma [phone]

## 2023-04-21 LAB
ANION GAP SERPL CALCULATED.3IONS-SCNC: 11 MMOL/L (ref 7–15)
BUN SERPL-MCNC: 16.1 MG/DL (ref 6–20)
CALCIUM SERPL-MCNC: 9.8 MG/DL (ref 8.6–10)
CHLORIDE SERPL-SCNC: 96 MMOL/L (ref 98–107)
CREAT SERPL-MCNC: 0.81 MG/DL (ref 0.67–1.17)
CRP SERPL-MCNC: 50.9 MG/L
DEPRECATED HCO3 PLAS-SCNC: 23 MMOL/L (ref 22–29)
ERYTHROCYTE [DISTWIDTH] IN BLOOD BY AUTOMATED COUNT: 13.9 % (ref 10–15)
GFR SERPL CREATININE-BSD FRML MDRD: >90 ML/MIN/1.73M2
GLUCOSE CSF-MCNC: 68 MG/DL (ref 40–70)
GLUCOSE SERPL-MCNC: 103 MG/DL (ref 70–99)
HCT VFR BLD AUTO: 44.8 % (ref 40–53)
HGB BLD-MCNC: 14.3 G/DL (ref 13.3–17.7)
MCH RBC QN AUTO: 28.9 PG (ref 26.5–33)
MCHC RBC AUTO-ENTMCNC: 31.9 G/DL (ref 31.5–36.5)
MCV RBC AUTO: 91 FL (ref 78–100)
PLATELET # BLD AUTO: 391 10E3/UL (ref 150–450)
POTASSIUM SERPL-SCNC: 4 MMOL/L (ref 3.4–5.3)
PROT CSF-MCNC: 23.1 MG/DL (ref 15–45)
RBC # BLD AUTO: 4.94 10E6/UL (ref 4.4–5.9)
SODIUM SERPL-SCNC: 130 MMOL/L (ref 136–145)
VANCOMYCIN SERPL-MCNC: 17.8 UG/ML
WBC # BLD AUTO: 7.9 10E3/UL (ref 4–11)

## 2023-04-21 PROCEDURE — 86140 C-REACTIVE PROTEIN: CPT | Performed by: NURSE PRACTITIONER

## 2023-04-21 PROCEDURE — 84157 ASSAY OF PROTEIN OTHER: CPT

## 2023-04-21 PROCEDURE — 36415 COLL VENOUS BLD VENIPUNCTURE: CPT

## 2023-04-21 PROCEDURE — 250N000011 HC RX IP 250 OP 636: Performed by: NEUROLOGICAL SURGERY

## 2023-04-21 PROCEDURE — 82310 ASSAY OF CALCIUM: CPT

## 2023-04-21 PROCEDURE — 120N000002 HC R&B MED SURG/OB UMMC

## 2023-04-21 PROCEDURE — 250N000011 HC RX IP 250 OP 636

## 2023-04-21 PROCEDURE — 250N000013 HC RX MED GY IP 250 OP 250 PS 637: Performed by: NURSE PRACTITIONER

## 2023-04-21 PROCEDURE — 85027 COMPLETE CBC AUTOMATED: CPT

## 2023-04-21 PROCEDURE — 99232 SBSQ HOSP IP/OBS MODERATE 35: CPT | Performed by: PHYSICIAN ASSISTANT

## 2023-04-21 PROCEDURE — 82945 GLUCOSE OTHER FLUID: CPT

## 2023-04-21 PROCEDURE — 80202 ASSAY OF VANCOMYCIN: CPT | Performed by: NEUROLOGICAL SURGERY

## 2023-04-21 PROCEDURE — 250N000013 HC RX MED GY IP 250 OP 250 PS 637: Performed by: STUDENT IN AN ORGANIZED HEALTH CARE EDUCATION/TRAINING PROGRAM

## 2023-04-21 PROCEDURE — 250N000013 HC RX MED GY IP 250 OP 250 PS 637

## 2023-04-21 PROCEDURE — 999N000128 HC STATISTIC PERIPHERAL IV START W/O US GUIDANCE

## 2023-04-21 PROCEDURE — 87205 SMEAR GRAM STAIN: CPT

## 2023-04-21 PROCEDURE — 87076 CULTURE ANAEROBE IDENT EACH: CPT | Performed by: STUDENT IN AN ORGANIZED HEALTH CARE EDUCATION/TRAINING PROGRAM

## 2023-04-21 PROCEDURE — 87075 CULTR BACTERIA EXCEPT BLOOD: CPT

## 2023-04-21 PROCEDURE — 258N000003 HC RX IP 258 OP 636: Performed by: NEUROLOGICAL SURGERY

## 2023-04-21 RX ADMIN — Medication 99 MG: at 07:43

## 2023-04-21 RX ADMIN — ACETAMINOPHEN 650 MG: 325 TABLET, FILM COATED ORAL at 21:06

## 2023-04-21 RX ADMIN — VANCOMYCIN HYDROCHLORIDE 1500 MG: 10 INJECTION, POWDER, LYOPHILIZED, FOR SOLUTION INTRAVENOUS at 16:56

## 2023-04-21 RX ADMIN — LISINOPRIL 40 MG: 40 TABLET ORAL at 07:40

## 2023-04-21 RX ADMIN — VANCOMYCIN HYDROCHLORIDE 1250 MG: 10 INJECTION, POWDER, LYOPHILIZED, FOR SOLUTION INTRAVENOUS at 00:13

## 2023-04-21 RX ADMIN — METOPROLOL SUCCINATE 100 MG: 100 TABLET, EXTENDED RELEASE ORAL at 07:40

## 2023-04-21 RX ADMIN — HYDROCHLOROTHIAZIDE 25 MG: 25 TABLET ORAL at 07:40

## 2023-04-21 RX ADMIN — ACETAMINOPHEN 650 MG: 325 TABLET, FILM COATED ORAL at 15:16

## 2023-04-21 RX ADMIN — VANCOMYCIN HYDROCHLORIDE 1250 MG: 10 INJECTION, POWDER, LYOPHILIZED, FOR SOLUTION INTRAVENOUS at 07:40

## 2023-04-21 RX ADMIN — CEFTRIAXONE SODIUM 2 G: 2 INJECTION, POWDER, FOR SOLUTION INTRAMUSCULAR; INTRAVENOUS at 15:08

## 2023-04-21 RX ADMIN — CITALOPRAM HYDROBROMIDE 20 MG: 20 TABLET ORAL at 07:40

## 2023-04-21 RX ADMIN — CEFTRIAXONE SODIUM 2 G: 2 INJECTION, POWDER, FOR SOLUTION INTRAMUSCULAR; INTRAVENOUS at 04:34

## 2023-04-21 RX ADMIN — Medication 3 MG: at 21:06

## 2023-04-21 ASSESSMENT — ACTIVITIES OF DAILY LIVING (ADL)
ADLS_ACUITY_SCORE: 18

## 2023-04-21 NOTE — PHARMACY-VANCOMYCIN DOSING SERVICE
Pharmacy Vancomycin Note  Date of Service 2023  Patient's  1975   48 year old, male    Indication: CNS/infected hardware  Day of Therapy: 3  Current vancomycin regimen: 1250 mg IV q8h  Current vancomycin monitoring method: Trough (Method 2 = manual dose calculation)  Current vancomycin therapeutic monitoring goal: 15-20 mg/L      Current estimated CrCl = Estimated Creatinine Clearance: 134.1 mL/min (based on SCr of 0.81 mg/dL).    Creatinine for last 3 days  2023:  8:07 AM Creatinine 0.60 mg/dL  2023: 11:04 AM Creatinine 0.62 mg/dL  2023:  5:59 AM Creatinine 0.81 mg/dL    Recent Vancomycin Levels (past 3 days)  2023:  5:59 AM Vancomycin 17.8 ug/mL (5.8 hr trough)    Vancomycin IV Administrations (past 72 hours)                   vancomycin (VANCOCIN) 1,250 mg in 0.9% NaCl 250 mL intermittent infusion (mg) 1,250 mg New Bag 23 0740     1,250 mg New Bag  0013     1,250 mg New Bag 23 1630     1,250 mg New Bag  0857     1,250 mg New Bag  0115     1,250 mg New Bag 23 1625                Nephrotoxins and other renal medications (From now, onward)    Start     Dose/Rate Route Frequency Ordered Stop    23 1600  vancomycin (VANCOCIN) 1,500 mg in 0.9% NaCl 250 mL intermittent infusion         1,500 mg  over 90 Minutes Intravenous EVERY 8 HOURS 23 0922      23 1630  lisinopril (ZESTRIL) tablet 40 mg        Note to Pharmacy: PTA Sig:Take 40 mg by mouth daily      40 mg Oral DAILY 23 1620               Contrast Orders - past 72 hours (72h ago, onward)    None          Interpretation of levels and current regimen:  Vancomycin level is reflective of subtherapeutic level. True 8 hour trough would be closer to 12.9 mg/L.    Has serum creatinine changed greater than 50% in last 72 hours: No    Urine output:  unable to determine, not charting    Renal Function: Stable        Plan:  1. Increase Dose to vancomycin 1500 mg IV every  8 hours.  2. Vancomycin  monitoring method: Trough (Method 2 = manual dose calculation)  3. Vancomycin therapeutic monitoring goal: 15-20 mg/L  4. Pharmacy will check vancomycin levels as appropriate in 1-3 Days.  5. Serum creatinine levels will be ordered daily for the first week of therapy and at least twice weekly for subsequent weeks.      Rosendo Gilbert, PharmD, BCPS  April 21, 2023

## 2023-04-21 NOTE — PLAN OF CARE
Status: POD #3 externalization of shunt. Admitted for HA with concern for shunt malfuntion. Hx of idiopathic intracranial HTN,  shunt ('92), shunt revision ('15).  Vitals: VSS on RA ex intermittently bradycardic overnight.  Neuros: Intact.  IV: PIV TKO inbetween IV abx.   Labs/Electrolytes: Reviewed.   Resp/trach: on RA. Denies SOB.   Diet: Regular, good PO.  Bowel status: BS+, one BM this shift. LBM 4/19.  : Voiding spontaneously  Skin:  shunt externalized at upper R abdomen, dressing CDI.  Pain: Abdominal soreness where shunt externalized managed with PRN oxycodone and tylenol, none needed overnight.  Activity: Up ad manpreet, steady on feet.   Social: Patient resting in between cares.  Plan: Continue to monitor and follow POC. Empty  shunt q3hr. Monitor for fevers/chills/severe HA.

## 2023-04-21 NOTE — PROVIDER NOTIFICATION
MD notified: PETROS     DOLLY holder critical lab called with CSF 2+ gram + bacilli resembling diphtheria, 4+ WBC thx [place] [id number]

## 2023-04-21 NOTE — PLAN OF CARE
Status: POD #3 externalization of shunt. Admitted for HA with concern for shunt malfuntion. Hx of idiopathic intracranial HTN,  shunt ('92), shunt revision ('15)  Vitals: VSS  Neuros: Intact.  IV: PIV infusing TKO inbetween IV abx. New PIV. R FA PIV infiltrated with NS flush prior to hanging vanco IV today.  Labs/Electrolytes: WNL  Resp/trach: WNL, RA.  Diet: Regular  Bowel status: LBM 4/21  : Voiding spon  Skin: Intact, shunt site WNL.   Pain: Denies  Activity: Up ad manpreet  Plan: Continue current POC  Updates: Cultures continue to show diphtheroids in the blood gram + bacilli. 4+ WBC.

## 2023-04-21 NOTE — PROGRESS NOTES
[facility], [place]   04/21/2023  Neurosurgery Progress Note:    Assessment:  Edwin Clements is a 48-year-old man presenting with symptoms concerning for elevated intracranial pressure (progressive HA, blurry vision), with recent CT abdomen showing pseudocyst surrounding the distal catheter.      Patient is POD-3 s/p bedside externalization of the shunt.    Plan:  - Follow CSF and catheter tip cultures  - ID consult -appreciate recommendations  - Monitor for fevers/chills/ severe headaches  - Serial neuro exams  - Pain control  - HOB > 30 degrees  - Advance diet as tolerates  - Bowel regimen  - PRN antiemetics  - IVF until taking adequate PO  - PT/OT  - SCDs for DVT proph  -----------------------------------  TREEZA Vazquez, CNP  Neurosurgery  Pager [phone]      Interval History: No acute events overnights. Denies headaches,  fever, chills, muscle aches or vision changes. CSF sample to be sent today.     Objective:   Temp:  [97.7  F (36.5  C)-98.2  F (36.8  C)] 98  F (36.7  C)  Pulse:  [55-78] 71  Resp:  [16] 16  BP: ()/(68-74) 109/74  SpO2:  [94 %-99 %] 96 %  I/O last 3 completed shifts:  In: 830 [P.O.:480; I.V.:350]  Out: 467 [Other:467]    Gen: Appears comfortable, NAD  Neurologic:  - Alert & Oriented to person, place, time, and situation  - Follows commands briskly  - Speech fluent, spontaneous. No aphasia or dysarthria.  - No gaze preference. No apparent hemineglect.  - PERRL, EOMI  - Face symmetric with sensation intact to light touch  - Palate elevates symmetrically, uvula midline, tongue protrudes midline  - Trapezii muscles 5/5 bilaterally  - No pronator drift     Del Tr Bi WE WF Gr   R 5 5 5 5 5 5   L 5 5 5 5 5 5    HF KE KF DF PF EHL   R 5 5 5 5 5 5   L 5 5 5 5 5 5     Reflexes 2+ throughout    Sensation intact and symmetric to light touch throughout    LABS:  Recent Labs   Lab 04/21/23  0559 04/20/23  1104 04/19/23  0807   * 134* 135*   POTASSIUM 4.0 4.3 3.9    CHLORIDE 96* 100 98   CO2 23 22 23   ANIONGAP 11 12 14   * 104* 90   BUN 16.1 11.4 10.3   CR 0.81 0.62* 0.60*   NIKO 9.8 9.8 10.0       Recent Labs   Lab 04/21/23  0559   WBC 7.9   RBC 4.94   HGB 14.3   HCT 44.8   MCV 91   MCH 28.9   MCHC 31.9   RDW 13.9          IMAGING:  No results found for this or any previous visit (from the past 24 hour(s)).

## 2023-04-21 NOTE — PROGRESS NOTES
"  [facility] PROGRESS NOTE     Patient:  Edwin Clements   Date of birth 1975, Medical record number [id number]  Date of Visit:  04/21/2023  Date of Admission: 4/17/2023  Consult Requester:Arnaud Martínez, *          Assessment and Plan:   ID Problem List   1. Cutibacterium acnes  shunt infection               -CSF cultures 4/17 positive. Subsequent CSF sampling remains abnormal  2. Abdominal loculated fluid collection c/f abscess associated with distal  shunt catheter  2. Headaches and blurry vision c/f shunt malfunction  3.  shunt for idiopathic intracranial HTN      RECOMMENDATION:  1. CSF gram stain remains positive for GPBs and WBC, no apparent improvement. Continue to recommend prompt removal of shunt with EVD placement and IV antibiotics per the IDSA guidelines for Healthcare associated ventriculitis and meningitis (#62). Start date of antibiotics will be from shunt removal.  2. Continue IV Ceftriaxone 2g q12hrs and IV Vancomycin (pharmacy to dose for CNS coverage).   3. Please continue daily CSF sampling to monitor for clearance of infection. We will use the IDSA guidelines for determining when shunt replacement is possible- dependent on time from shunt removal and clearance of CNS infection.     ASSESSMENT:  Edwin Clements is a 48 year old male with PMHx significant for idiopathic intracranial HTN s/p  shunt (1992) who presents to the ED with headache and pressure c/f shunt malfunction.      Afebrile and HDS since admission. CT head w/o e/o abnormalities. CT CAP at OSH 3/21/23 with \"16.9 cm loculated fluid collection in left mid abdomen associated with tip of  shunt catheter..\" Repeat CT AP 4/5 with decreased size of loculated fluid collection around 14.6cm. Repeat CT AP 4/19 with further decrease in abdominal fluid collection to 10.4 cm. IR evaluated as to whether abdominal fluid collection could be sampled and unfortunately there was no safe window of " accesss for sampling or drain placement. Will need serial CT scans to monitor for resolution.     CSF sampled from  shunt near scalp 4/17 which appeared unremarkable (WBC 4, cultures NGTD), however cultures positive for growth of C acnes.  shunt externalized near abdomen 4/18. Repeat sampling 4/19 (, GS + GPBs), 20th (cell count 206, GS with GPBs), and 21 (GS with GPBs) concerning for infection. There is now continuous evidence on CSF of shunt infection with Cutibacterium acnes. Since the system is contiguous, removal of  shunt system is necessary for clearance of infection. C acnes is known to form biofilms on hardware. IDSA guidelines recommend removal of shunt system with placement of EVD and initiation of IV abx for treatment. Start date of antibiotics will be from time of shunt removal (soucre control) and duration of antibiotics prior to shunt replacement will depend on culture clearance as well as time from removal. It is possible that replacement could be as soon as 7 days from removal if cultures clear. On CNS dosed Ceftriaxone and Vancomycin for treatment.     Recommendations discussed with primary team. ID will continue to follow.     40 MINUTES SPENT BY ME on the date of service doing chart review, history, exam, documentation & further activities per the note.       Leah Laughlin PA-C  Infectious Diseases  Pager #[phone]          Interim History and Events:   Edwin is feeling well. Offers no complaints. Denies new symptoms. No rashes or reactions to IV abx. No diarrhea.         HPI:   Edwin Clements is a 48 year old male with PMHx significant for idiopathic intracranial HTN s/p  shunt (1992) who presents to the ED with headache and pressure c/f shunt malfunction.      Recently seen by [facility] outpatient ~ 1 week prior for findings of pseudocyst in abdomen surrounding distal end of  catheter. Initial plans were for revision of shunt later this year, however he presented to the ED  "4/17/23 with headaches for several days at home with associated pressure behind eyes and blurry vision- which are similar symptoms to when shunt malfunctioned in the past. Of note, prior to discovery of pseudocyst, patient did have complaints of abdominal pain which he felt like went back as far as last year. He was having a bout of this abdominal pain (localized to RLQ) prior to his trip to [place] at the beginninig of March. It worsened while in [place] with distension of his abdomen and decreased appetite. Has since improved. Denies fever, chills, sweats, n/v/d, or URI symptoms. No rashes or lesions on skin.      Lives in [place], [place]. 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 Denied consuming raw meat. No recent travel outside of trip to [place]- stayed at all inclusive resort and did not leave the resort or consume local foods.            ROS:   -Focused 5 point ROS completed, pertinent positives and negatives listed above.    Physical Examination:  Temp: 98  F (36.7  C) Temp src: Oral BP: 109/74 Pulse: 71   Resp: 16 SpO2: 96 % O2 Device: None (Room air)      There were no vitals filed for this visit.    Constitutional: Pleasant male seen sitting up in recliner chair, in NAD. Alert and interactive.   HEENT: NCAT, anicteric sclerae, conjunctiva clear. Moist mucous membranes.  Respiratory: Non-labored breathing on RA.   GI: Abdomen is soft, non-distended.  Skin: Warm and dry. No rashes.  Musculoskeletal: Extremities grossly normal.  Neurologic: A &O x3, speech normal, answering questions appropriately. Moves all extremities spontaneously. Grossly non-focal.  Neuropsychiatric: Mentation and affect normal/appropriate.  VAD: PIV      Medications:    cefTRIAXone  2 g Intravenous Q12H     citalopram  20 mg Oral Daily     hydrochlorothiazide  25 mg Oral Daily     lisinopril  40 mg Oral Daily     metoprolol succinate ER  100 mg Oral Daily     " Potassium Gluconate  99 mg Oral Daily     vancomycin  1,500 mg Intravenous Q8H       Infusions/Drips:      Laboratory Data:   No results found for: ACD4    Inflammatory Markers    No lab results found.    Metabolic Studies       Recent Labs   Lab Test 04/21/23  0559 04/20/23  1104 04/19/23  0807 04/18/23  0727 04/17/23  1543 03/26/15  0754   * 134* 135* 136 139 136   POTASSIUM 4.0 4.3 3.9 3.9 3.7 3.8   CHLORIDE 96* 100 98 100 100 103   CO2 23 22 23 22 26 27   ANIONGAP 11 12 14 14 13 6   BUN 16.1 11.4 10.3 11.8 14.8 22   CR 0.81 0.62* 0.60* 0.63* 0.67 0.83   GFRESTIMATED >90 >90 >90 >90 >90 >90  Non African American GFR Calc     * 104* 90 112* 96 87   NIKO 9.8 9.8 10.0 9.4 9.5 9.3       Hepatic Studies    Recent Labs   Lab Test 04/17/23  1543   BILITOTAL 0.4   ALKPHOS 90   ALBUMIN 4.2   AST 18   ALT 9*       Pancreatitis testing    No lab results found.    Hematology Studies      Recent Labs   Lab Test 04/21/23  0559 04/20/23  1104 04/19/23  0807 04/18/23  0727 04/17/23  1543 04/17/23  1543 03/26/15  0754   WBC 7.9 8.1 7.3 9.7  --  9.0 7.6   HGB 14.3 14.8 14.7 14.4  --  13.7 16.3   HCT 44.8 45.6 45.9 44.3  --  42.0 46.2    403 361 353   < > 400 308    < > = values in this interval not displayed.     CSF testing     Recent Labs   Lab Test 04/20/23  1150 04/20/23  0953 04/19/23  0934 04/17/23  1831   CWBC 206* 123* 223* 4   CNEU 3 2 6  --    CLYM 78 85 79  --    CMONO 19 13 15  --    CRBC 0 4* 4* 1   CGLU 63 69 67 57   CTP 27.5 21.7 24.1 7.7*         Microbiology:  Culture   Date Value Ref Range Status   04/19/2023 No anaerobic organisms isolated after 1 day  Preliminary   04/19/2023 No growth, less than 1 day  Preliminary   04/18/2023 No Growth  Final   04/17/2023 Culture in progress  Preliminary   04/17/2023 (AA)  Preliminary    Isolated in broth only Gram positive bacilli, resembling diphtheroids     Comment:     On day 2 of incubation   04/17/2023 Culture in progress  Preliminary   04/17/2023 1+  Cutibacterium (Propionibacterium) acnes (AA)  Preliminary       Last check of C difficile  No results found for: CDBPCT    Imaging:  IMAGING  CT AP w/o contrast (4/19/23)  IMPRESSION:   1. Externalized ventriculoperitoneal shunt with decreased size of the  central abdominal fluid collection measuring 10.4 cm in greatest  dimension (previously 15.6 cm). No free air.  2. Stable remaining exam.     CT head (4/17/23)   Impression:  1. Stable position of right ventriculostomy catheter.  2. Stable size of the ventricular system.  3. No acute intracranial findings.     CT AP w/o contrast (4/5/23)  IMPRESSION:   1. Decreased size of the loculated fluid collection in the central  abdomen about the ventriculoperitoneal shunt tip measuring 14.6 cm in  greatest dimension (previously 19.9 cm).   2. Stable 9 mm hemorrhagic/proteinaceous renal cyst in the right lower  pole.  3. Additional incidental/chronic findings as noted above.     OSH imaging:   CT AP w/ contrast (3/21/23)  IMPRESSION:   1.  Moderately large 16.9 cm loculated fluid collection in the left mid abdomen associated with the tip of the ventriculoperitoneal shunt catheter which results in mass effect on small bowel and could be a source of intermittent partial small bowel obstruction with a caliber change at this level without additional significant bowel dilatation is suggested high-grade obstruction. An infected fluid collection would be less likely given the lack inflammatory changes marginating the loculated fluid collection.     2.  8 mm exophytic right renal lesion anteriorly which may be a small hyperdense renal cyst or possibly small solid renal mass. Further evaluation with renal ultrasound is suggested.

## 2023-04-22 LAB
ANION GAP SERPL CALCULATED.3IONS-SCNC: 12 MMOL/L (ref 7–15)
APPEARANCE CSF: CLEAR
BACTERIA CSF CULT: ABNORMAL
BUN SERPL-MCNC: 17 MG/DL (ref 6–20)
CALCIUM SERPL-MCNC: 9.6 MG/DL (ref 8.6–10)
CHLORIDE SERPL-SCNC: 101 MMOL/L (ref 98–107)
COLOR CSF: COLORLESS
CREAT SERPL-MCNC: 0.64 MG/DL (ref 0.67–1.17)
DEPRECATED HCO3 PLAS-SCNC: 22 MMOL/L (ref 22–29)
ERYTHROCYTE [DISTWIDTH] IN BLOOD BY AUTOMATED COUNT: 13.7 % (ref 10–15)
GFR SERPL CREATININE-BSD FRML MDRD: >90 ML/MIN/1.73M2
GLUCOSE CSF-MCNC: 72 MG/DL (ref 40–70)
GLUCOSE SERPL-MCNC: 91 MG/DL (ref 70–99)
GRAM STAIN RESULT: ABNORMAL
GRAM STAIN RESULT: ABNORMAL
HCT VFR BLD AUTO: 45.8 % (ref 40–53)
HGB BLD-MCNC: 14.9 G/DL (ref 13.3–17.7)
LYMPH ABN NFR CSF MANUAL: 84 %
MCH RBC QN AUTO: 29.2 PG (ref 26.5–33)
MCHC RBC AUTO-ENTMCNC: 32.5 G/DL (ref 31.5–36.5)
MCV RBC AUTO: 90 FL (ref 78–100)
MONOS+MACROS NFR CSF MANUAL: 14 %
NEUTROPHILS NFR CSF MANUAL: 2 %
PLATELET # BLD AUTO: 384 10E3/UL (ref 150–450)
POTASSIUM SERPL-SCNC: 4.1 MMOL/L (ref 3.4–5.3)
PROT CSF-MCNC: 25.7 MG/DL (ref 15–45)
RBC # BLD AUTO: 5.1 10E6/UL (ref 4.4–5.9)
RBC # CSF MANUAL: 5 /UL (ref 0–2)
SODIUM SERPL-SCNC: 135 MMOL/L (ref 136–145)
TUBE # CSF: 1
WBC # BLD AUTO: 7.1 10E3/UL (ref 4–11)
WBC # CSF MANUAL: 36 /UL (ref 0–5)

## 2023-04-22 PROCEDURE — 250N000011 HC RX IP 250 OP 636: Performed by: NEUROLOGICAL SURGERY

## 2023-04-22 PROCEDURE — 84157 ASSAY OF PROTEIN OTHER: CPT

## 2023-04-22 PROCEDURE — 250N000013 HC RX MED GY IP 250 OP 250 PS 637: Performed by: NURSE PRACTITIONER

## 2023-04-22 PROCEDURE — 250N000011 HC RX IP 250 OP 636

## 2023-04-22 PROCEDURE — 120N000002 HC R&B MED SURG/OB UMMC

## 2023-04-22 PROCEDURE — 89051 BODY FLUID CELL COUNT: CPT

## 2023-04-22 PROCEDURE — 80048 BASIC METABOLIC PNL TOTAL CA: CPT

## 2023-04-22 PROCEDURE — 36415 COLL VENOUS BLD VENIPUNCTURE: CPT

## 2023-04-22 PROCEDURE — 85027 COMPLETE CBC AUTOMATED: CPT

## 2023-04-22 PROCEDURE — 250N000013 HC RX MED GY IP 250 OP 250 PS 637: Performed by: STUDENT IN AN ORGANIZED HEALTH CARE EDUCATION/TRAINING PROGRAM

## 2023-04-22 PROCEDURE — 87076 CULTURE ANAEROBE IDENT EACH: CPT | Performed by: STUDENT IN AN ORGANIZED HEALTH CARE EDUCATION/TRAINING PROGRAM

## 2023-04-22 PROCEDURE — 258N000003 HC RX IP 258 OP 636: Performed by: NEUROLOGICAL SURGERY

## 2023-04-22 PROCEDURE — 250N000013 HC RX MED GY IP 250 OP 250 PS 637

## 2023-04-22 PROCEDURE — 87075 CULTR BACTERIA EXCEPT BLOOD: CPT

## 2023-04-22 PROCEDURE — 82945 GLUCOSE OTHER FLUID: CPT

## 2023-04-22 PROCEDURE — 87205 SMEAR GRAM STAIN: CPT

## 2023-04-22 RX ORDER — ROPINIROLE 0.25 MG/1
0.25 TABLET, FILM COATED ORAL 2 TIMES DAILY PRN
Status: DISCONTINUED | OUTPATIENT
Start: 2023-04-22 | End: 2023-05-03 | Stop reason: HOSPADM

## 2023-04-22 RX ADMIN — LISINOPRIL 40 MG: 40 TABLET ORAL at 08:35

## 2023-04-22 RX ADMIN — Medication 99 MG: at 08:36

## 2023-04-22 RX ADMIN — VANCOMYCIN HYDROCHLORIDE 1500 MG: 10 INJECTION, POWDER, LYOPHILIZED, FOR SOLUTION INTRAVENOUS at 08:30

## 2023-04-22 RX ADMIN — METOPROLOL SUCCINATE 100 MG: 100 TABLET, EXTENDED RELEASE ORAL at 08:35

## 2023-04-22 RX ADMIN — VANCOMYCIN HYDROCHLORIDE 1500 MG: 10 INJECTION, POWDER, LYOPHILIZED, FOR SOLUTION INTRAVENOUS at 00:26

## 2023-04-22 RX ADMIN — CITALOPRAM HYDROBROMIDE 20 MG: 20 TABLET ORAL at 08:35

## 2023-04-22 RX ADMIN — HYDROCHLOROTHIAZIDE 25 MG: 25 TABLET ORAL at 08:35

## 2023-04-22 RX ADMIN — ROPINIROLE HYDROCHLORIDE 0.25 MG: 0.25 TABLET, FILM COATED ORAL at 20:52

## 2023-04-22 RX ADMIN — Medication 3 MG: at 20:52

## 2023-04-22 RX ADMIN — CEFTRIAXONE SODIUM 2 G: 2 INJECTION, POWDER, FOR SOLUTION INTRAMUSCULAR; INTRAVENOUS at 04:40

## 2023-04-22 RX ADMIN — VANCOMYCIN HYDROCHLORIDE 1500 MG: 10 INJECTION, POWDER, LYOPHILIZED, FOR SOLUTION INTRAVENOUS at 17:43

## 2023-04-22 RX ADMIN — CEFTRIAXONE SODIUM 2 G: 2 INJECTION, POWDER, FOR SOLUTION INTRAMUSCULAR; INTRAVENOUS at 16:50

## 2023-04-22 ASSESSMENT — ACTIVITIES OF DAILY LIVING (ADL)
ADLS_ACUITY_SCORE: 18

## 2023-04-22 NOTE — PLAN OF CARE
Status: POD #4 externalization of shunt. Admitted for HA with concern for shunt malfuntion. Hx of idiopathic intracranial HTN,  shunt ('92), shunt revision ('15)  Vitals: VSS on RA  Neuros: Intact  IV: PIV is SL in between IV abx  Labs/Electrolytes: WDL  Resp/trach: Lung sounds are clear  Diet: Reg  Bowel status: Bowel sounds are active  : Voiding spontaneously  Skin: Externalized shunt site is WDL and CDI  Pain: Complained of slight pain, found adequate relief w/ PRN Tylenol  Activity: Independent  Social: Pt was calm and cooperative   Plan: Will continue to monitor and follow POC  Updates this shift: Pt seemed to rest well in between cares

## 2023-04-22 NOTE — PROGRESS NOTES
[facility], [place]   04/22/2023  Neurosurgery Progress Note:    Assessment:  Edwin Clements is a 48-year-old man presenting with symptoms concerning for elevated intracranial pressure (progressive HA, blurry vision), with recent CT abdomen showing pseudocyst surrounding the distal catheter.      Patient is POD-4 s/p bedside externalization of the shunt.    Plan:  - Follow CSF and catheter tip cultures  - ID consult -appreciate recommendations  - Monitor for fevers/chills/ severe headaches  - Serial neuro exams  - Pain control  - HOB > 30 degrees  - Advance diet as tolerates  - Bowel regimen  - PRN antiemetics  - IVF until taking adequate PO  - PT/OT  - SCDs for DVT proph  -----------------------------------  Marcin Gastelum  Neurosurgery Resident PGY2    Interval History: No acute events overnights. Denies headaches,  fever, chills, muscle aches or vision changes. CSF sample to be sent again today.     Objective:   Temp:  [97.7  F (36.5  C)-98.2  F (36.8  C)] 97.7  F (36.5  C)  Pulse:  [61-72] 62  Resp:  [12-16] 16  BP: (103-116)/(67-80) 113/77  SpO2:  [95 %-99 %] 95 %  I/O last 3 completed shifts:  In: 350 [I.V.:350]  Out: 525 [Other:525]    Gen: Appears comfortable, NAD  Neurologic:  - Alert & Oriented to person, place, time, and situation  - Follows commands briskly  - Speech fluent, spontaneous. No aphasia or dysarthria.  - No gaze preference. No apparent hemineglect.  - PERRL, EOMI  - Face symmetric with sensation intact to light touch  - Palate elevates symmetrically, uvula midline, tongue protrudes midline  - Trapezii muscles 5/5 bilaterally  - No pronator drift     Del Tr Bi WE WF Gr   R 5 5 5 5 5 5   L 5 5 5 5 5 5    HF KE KF DF PF EHL   R 5 5 5 5 5 5   L 5 5 5 5 5 5     Reflexes 2+ throughout    Sensation intact and symmetric to light touch throughout    LABS:  Recent Labs   Lab 04/22/23  0813 04/21/23  0559 04/20/23  1104   * 130* 134*   POTASSIUM 4.1 4.0 4.3   CHLORIDE  101 96* 100   CO2 22 23 22   ANIONGAP 12 11 12   GLC 91 103* 104*   BUN 17.0 16.1 11.4   CR 0.64* 0.81 0.62*   NIKO 9.6 9.8 9.8       Recent Labs   Lab 04/22/23  0813   WBC 7.1   RBC 5.10   HGB 14.9   HCT 45.8   MCV 90   MCH 29.2   MCHC 32.5   RDW 13.7          IMAGING:  No results found for this or any previous visit (from the past 24 hour(s)).

## 2023-04-22 NOTE — PLAN OF CARE
Vitals: VSS   Neuros: Intact  IV: PIV is SL in between IV abx  Labs/Electrolytes: MD notified of + Spinal Fluid from 4/19  Resp/trach: WNL  Diet: Regular - Good po   Bowel status: BM 4/21  : Voiding spontaneously  Skin: Externalized shunt site CDI - See flowsheets for output  Pain: Denies   Activity: Independent  Social: Wife and sister at bedside   Updates this shift: CSF sent by MD - pending

## 2023-04-23 LAB
ANION GAP SERPL CALCULATED.3IONS-SCNC: 12 MMOL/L (ref 7–15)
APPEARANCE CSF: CLEAR
BACTERIA CSF CULT: ABNORMAL
BUN SERPL-MCNC: 16.7 MG/DL (ref 6–20)
CALCIUM SERPL-MCNC: 9.4 MG/DL (ref 8.6–10)
CHLORIDE SERPL-SCNC: 101 MMOL/L (ref 98–107)
COLOR CSF: COLORLESS
CREAT SERPL-MCNC: 0.7 MG/DL (ref 0.67–1.17)
CRP SERPL-MCNC: 40 MG/L
DEPRECATED HCO3 PLAS-SCNC: 22 MMOL/L (ref 22–29)
ERYTHROCYTE [DISTWIDTH] IN BLOOD BY AUTOMATED COUNT: 13.7 % (ref 10–15)
GFR SERPL CREATININE-BSD FRML MDRD: >90 ML/MIN/1.73M2
GLUCOSE CSF-MCNC: 58 MG/DL (ref 40–70)
GLUCOSE SERPL-MCNC: 91 MG/DL (ref 70–99)
GRAM STAIN RESULT: ABNORMAL
GRAM STAIN RESULT: ABNORMAL
HCT VFR BLD AUTO: 43.7 % (ref 40–53)
HGB BLD-MCNC: 14.1 G/DL (ref 13.3–17.7)
MCH RBC QN AUTO: 28.8 PG (ref 26.5–33)
MCHC RBC AUTO-ENTMCNC: 32.3 G/DL (ref 31.5–36.5)
MCV RBC AUTO: 89 FL (ref 78–100)
PLATELET # BLD AUTO: 416 10E3/UL (ref 150–450)
POTASSIUM SERPL-SCNC: 4 MMOL/L (ref 3.4–5.3)
PROT CSF-MCNC: 24.2 MG/DL (ref 15–45)
RBC # BLD AUTO: 4.9 10E6/UL (ref 4.4–5.9)
RBC # CSF MANUAL: 0 /UL (ref 0–2)
SODIUM SERPL-SCNC: 135 MMOL/L (ref 136–145)
TUBE # CSF: ABNORMAL
VANCOMYCIN SERPL-MCNC: 21 UG/ML
WBC # BLD AUTO: 8.3 10E3/UL (ref 4–11)
WBC # CSF MANUAL: 21 /UL (ref 0–5)

## 2023-04-23 PROCEDURE — 80048 BASIC METABOLIC PNL TOTAL CA: CPT

## 2023-04-23 PROCEDURE — 999N000128 HC STATISTIC PERIPHERAL IV START W/O US GUIDANCE

## 2023-04-23 PROCEDURE — 250N000013 HC RX MED GY IP 250 OP 250 PS 637: Performed by: STUDENT IN AN ORGANIZED HEALTH CARE EDUCATION/TRAINING PROGRAM

## 2023-04-23 PROCEDURE — 120N000002 HC R&B MED SURG/OB UMMC

## 2023-04-23 PROCEDURE — 36415 COLL VENOUS BLD VENIPUNCTURE: CPT

## 2023-04-23 PROCEDURE — 250N000013 HC RX MED GY IP 250 OP 250 PS 637: Performed by: NURSE PRACTITIONER

## 2023-04-23 PROCEDURE — 89050 BODY FLUID CELL COUNT: CPT

## 2023-04-23 PROCEDURE — 250N000011 HC RX IP 250 OP 636: Performed by: NEUROLOGICAL SURGERY

## 2023-04-23 PROCEDURE — 258N000003 HC RX IP 258 OP 636: Performed by: NEUROLOGICAL SURGERY

## 2023-04-23 PROCEDURE — 87040 BLOOD CULTURE FOR BACTERIA: CPT

## 2023-04-23 PROCEDURE — 86140 C-REACTIVE PROTEIN: CPT | Performed by: NURSE PRACTITIONER

## 2023-04-23 PROCEDURE — 250N000013 HC RX MED GY IP 250 OP 250 PS 637

## 2023-04-23 PROCEDURE — 250N000011 HC RX IP 250 OP 636

## 2023-04-23 PROCEDURE — 80202 ASSAY OF VANCOMYCIN: CPT | Performed by: NEUROLOGICAL SURGERY

## 2023-04-23 PROCEDURE — 87205 SMEAR GRAM STAIN: CPT

## 2023-04-23 PROCEDURE — 87075 CULTR BACTERIA EXCEPT BLOOD: CPT

## 2023-04-23 PROCEDURE — 84157 ASSAY OF PROTEIN OTHER: CPT

## 2023-04-23 PROCEDURE — 85027 COMPLETE CBC AUTOMATED: CPT

## 2023-04-23 PROCEDURE — 87076 CULTURE ANAEROBE IDENT EACH: CPT | Performed by: STUDENT IN AN ORGANIZED HEALTH CARE EDUCATION/TRAINING PROGRAM

## 2023-04-23 PROCEDURE — 82945 GLUCOSE OTHER FLUID: CPT

## 2023-04-23 RX ADMIN — METOPROLOL SUCCINATE 100 MG: 100 TABLET, EXTENDED RELEASE ORAL at 08:05

## 2023-04-23 RX ADMIN — CITALOPRAM HYDROBROMIDE 20 MG: 20 TABLET ORAL at 08:05

## 2023-04-23 RX ADMIN — VANCOMYCIN HYDROCHLORIDE 1500 MG: 10 INJECTION, POWDER, LYOPHILIZED, FOR SOLUTION INTRAVENOUS at 00:45

## 2023-04-23 RX ADMIN — VANCOMYCIN HYDROCHLORIDE 1250 MG: 10 INJECTION, POWDER, LYOPHILIZED, FOR SOLUTION INTRAVENOUS at 09:26

## 2023-04-23 RX ADMIN — HYDROCHLOROTHIAZIDE 25 MG: 25 TABLET ORAL at 08:05

## 2023-04-23 RX ADMIN — ROPINIROLE HYDROCHLORIDE 0.25 MG: 0.25 TABLET, FILM COATED ORAL at 21:13

## 2023-04-23 RX ADMIN — Medication 3 MG: at 21:13

## 2023-04-23 RX ADMIN — CEFTRIAXONE SODIUM 2 G: 2 INJECTION, POWDER, FOR SOLUTION INTRAMUSCULAR; INTRAVENOUS at 16:27

## 2023-04-23 RX ADMIN — LISINOPRIL 40 MG: 40 TABLET ORAL at 08:05

## 2023-04-23 RX ADMIN — VANCOMYCIN HYDROCHLORIDE 1250 MG: 10 INJECTION, POWDER, LYOPHILIZED, FOR SOLUTION INTRAVENOUS at 18:00

## 2023-04-23 RX ADMIN — Medication 99 MG: at 08:06

## 2023-04-23 RX ADMIN — CEFTRIAXONE SODIUM 2 G: 2 INJECTION, POWDER, FOR SOLUTION INTRAMUSCULAR; INTRAVENOUS at 04:31

## 2023-04-23 ASSESSMENT — ACTIVITIES OF DAILY LIVING (ADL)
ADLS_ACUITY_SCORE: 18

## 2023-04-23 NOTE — PROVIDER NOTIFICATION
Lucia Vogt paged regarding labs from 4/20 5775 CSF. Growing Cutibacterium (Propionibacterium) acnes

## 2023-04-23 NOTE — PLAN OF CARE
Vitals: VSS   Neuros: Intact  IV: PIV SLd in between IV abx. Vanco extravasation to LUE. Site marked, reddened. Warm packs applied and arm elevated per policy.   Labs/Electrolytes: MD notified of + Spinal Fluid from 4/20  Resp/trach: WNL  Diet: Regular - Good po   Bowel status: BM 4/22  : Voiding spontaneously  Skin: Externalized shunt site CDI - See flowsheets for output  Pain: Denies   Activity: Independent  Social: Family at bedside   Updates this shift: CSF sent by MD - pending  Plan: Will need 24hr extravasation picture Monday at 1300.

## 2023-04-23 NOTE — PLAN OF CARE
Status:  Externalization of shunt. Admitted for HA with concern for shunt malfuntion. Hx of idiopathic intracranial HTN,  shunt ('92), shunt revision ('15).   Vitals: VSS on RA.   Neuros: Intact   IV: PIV SL in between IV  abx.   Labs/Electrolytes:    Resp/trach: Denies SOB. LSC.   Diet: Regular diet.   Bowel status:  LBM 4/21.   : Voiding w/o difficulty.   Skin:  Externalized  shunt at R upper abd,  covered with dressing. EVD Q3hrs.   Pain: Denies   Activity: Independent. HOB. 30. Pt up in chair most of the shift and ambulated in hallway with wife.   Social: Pt's wife at bedside this evening.   Plan: Continue monitor. CSF result came back.

## 2023-04-23 NOTE — PLAN OF CARE
Status: POD #5 externalization of shunt. Admitted for HA with concern for shunt malfuntion. Hx of idiopathic intracranial HTN,  shunt ('92), shunt revision ('15)  Vitals: VSS on RA  Neuros: Intact  IV: PIV is SL in between IV abx  Labs/Electrolytes: WDL  Resp/trach: Lung sounds are clear  Diet: Reg  Bowel status: Bowel sounds are active  : Voiding spontaneously  Skin: Externalized shunt site is WDL and CDI  Pain: Complained of slight pain, found adequate relief w/ PRN Tylenol  Activity: Independent  Social: Pt was calm and cooperative   Plan: Will continue to monitor and follow POC  Updates this shift: Pt seemed to rest well in between cares

## 2023-04-23 NOTE — PROVIDER NOTIFICATION
Lucia Vogt paged regarding critcal lab     critical lab from CSF collected 1017 via  shunt- gram + basili resembling diptheroids

## 2023-04-23 NOTE — PROVIDER NOTIFICATION
Letty Vogt MD paged regarding infiltrated IV site from Vancomycin. LUE site was circled and warm pack was applied per policy. Extravasation kit was ordered.

## 2023-04-23 NOTE — PROGRESS NOTES
[facility], [place]   04/23/2023  Neurosurgery Progress Note:    Assessment:  Edwin Clements is a 48-year-old man presenting with symptoms concerning for elevated intracranial pressure (progressive HA, blurry vision), with recent CT abdomen showing pseudocyst surrounding the distal catheter.      Patient is POD-5 s/p bedside externalization of the shunt.    Plan:  - Follow CSF and catheter tip cultures  - ID consult -appreciate recommendations  - OR scheduled for Tuesday for shunt removal  - Monitor for fevers/chills/ severe headaches  - Serial neuro exams  - Pain control  - HOB > 30 degrees  - Advance diet as tolerates  - Bowel regimen  - PRN antiemetics  - IVF until taking adequate PO  - PT/OT  - SCDs for DVT proph  -----------------------------------  Dhara Mckee MD  Neurosurgery PGY3    Please contact neurosurgery resident on call with questions.    Dial * * [phone], enter [phone] when prompted.      Interval History: No acute events overnights. Denies headaches,  fever, chills, muscle aches or vision changes. CSF sample to be sent again today.     Objective:   Temp:  [97.9  F (36.6  C)-98.6  F (37  C)] 98.2  F (36.8  C)  Pulse:  [60-69] 69  Resp:  [16-18] 18  BP: (101-118)/(69-79) 110/70  SpO2:  [96 %-99 %] 99 %  I/O last 3 completed shifts:  In: 550 [P.O.:300; I.V.:250]  Out: 400 [Other:400]    Gen: Appears comfortable, NAD  Neurologic:  - Alert & Oriented to person, place, time, and situation  - Follows commands briskly  - Speech fluent, spontaneous. No aphasia or dysarthria.  - No gaze preference. No apparent hemineglect.  - PERRL, EOMI  - Face symmetric with sensation intact to light touch  - Palate elevates symmetrically, uvula midline, tongue protrudes midline  - Trapezii muscles 5/5 bilaterally  - No pronator drift     Del Tr Bi WE WF Gr   R 5 5 5 5 5 5   L 5 5 5 5 5 5    HF KE KF DF PF EHL   R 5 5 5 5 5 5   L 5 5 5 5 5 5     Reflexes 2+ throughout    Sensation intact and  symmetric to light touch throughout    LABS:  Recent Labs   Lab 04/23/23  0741 04/22/23  0813 04/21/23  0559   * 135* 130*   POTASSIUM 4.0 4.1 4.0   CHLORIDE 101 101 96*   CO2 22 22 23   ANIONGAP 12 12 11   GLC 91 91 103*   BUN 16.7 17.0 16.1   CR 0.70 0.64* 0.81   NIKO 9.4 9.6 9.8       Recent Labs   Lab 04/23/23  0741   WBC 8.3   RBC 4.90   HGB 14.1   HCT 43.7   MCV 89   MCH 28.8   MCHC 32.3   RDW 13.7          IMAGING:  No results found for this or any previous visit (from the past 24 hour(s)).

## 2023-04-23 NOTE — PHARMACY-VANCOMYCIN DOSING SERVICE
Pharmacy Vancomycin Note  Date of Service 2023  Patient's  1975   48 year old, male    Indication: CNS/infected hardware  Day of Therapy: 5  Current vancomycin regimen:  1500 mg IV q12h  Current vancomycin monitoring method: Trough (Method 2 = manual dose calculation)  Current vancomycin therapeutic monitoring goal: 15-20 mg/L    Current estimated CrCl = Estimated Creatinine Clearance: 155.2 mL/min (based on SCr of 0.7 mg/dL).    Creatinine for last 3 days  2023: 11:04 AM Creatinine 0.62 mg/dL  2023:  5:59 AM Creatinine 0.81 mg/dL  2023:  8:13 AM Creatinine 0.64 mg/dL  2023:  7:41 AM Creatinine 0.70 mg/dL    Recent Vancomycin Levels (past 3 days)  2023:  5:59 AM Vancomycin 17.8 ug/mL  2023:  7:41 AM Vancomycin 21.0 ug/mL    Vancomycin IV Administrations (past 72 hours)                   vancomycin (VANCOCIN) 1,500 mg in 0.9% NaCl 250 mL intermittent infusion (mg) 1,500 mg New Bag 23 0045     1,500 mg New Bag 23 1743     1,500 mg New Bag  0830     1,500 mg New Bag  0026     1,500 mg New Bag 23 1656    vancomycin (VANCOCIN) 1,250 mg in 0.9% NaCl 250 mL intermittent infusion (mg) 1,250 mg New Bag 23 0740     1,250 mg New Bag  0013     1,250 mg New Bag 23 1630                Nephrotoxins and other renal medications (From now, onward)    Start     Dose/Rate Route Frequency Ordered Stop    23 0900  vancomycin (VANCOCIN) 1,250 mg in 0.9% NaCl 250 mL intermittent infusion         1,250 mg  over 90 Minutes Intravenous EVERY 8 HOURS 23 0857      23 1630  lisinopril (ZESTRIL) tablet 40 mg        Note to Pharmacy: PTA Sig:Take 40 mg by mouth daily      40 mg Oral DAILY 23 1620               Contrast Orders - past 72 hours (72h ago, onward)    None          Interpretation of levels and current regimen:  Vancomycin level is reflective of supratherapeutic level    Has serum creatinine changed greater than 50% in last 72 hours:  No    Urine output:  unable to determine    Renal Function: Stable    Patient's vancomycin level returned at 21.0 mcg/mL. It was drawn 7 hours post dose. Patient's true trough is likely ~20. Vancomycin could continue to accumulate. Pharmacy will decrease dose.    Plan:  1. Decrease Dose to 1250 every 8 hours  2. Vancomycin monitoring method: Trough (Method 2 = manual dose calculation)  3. Vancomycin therapeutic monitoring goal: 15-20 mg/L  4. Pharmacy will check vancomycin levels as appropriate in 1-3 Days.  5. Serum creatinine levels will be ordered daily for the first week of therapy and at least twice weekly for subsequent weeks.    Vijay Clark RPH

## 2023-04-24 ENCOUNTER — APPOINTMENT (OUTPATIENT)
Dept: GENERAL RADIOLOGY | Facility: CLINIC | Age: 48
End: 2023-04-24
Payer: COMMERCIAL

## 2023-04-24 ENCOUNTER — ANESTHESIA EVENT (OUTPATIENT)
Dept: SURGERY | Facility: CLINIC | Age: 48
End: 2023-04-24
Payer: COMMERCIAL

## 2023-04-24 ENCOUNTER — HOME INFUSION (PRE-WILLOW HOME INFUSION) (OUTPATIENT)
Dept: PHARMACY | Facility: CLINIC | Age: 48
End: 2023-04-24
Payer: COMMERCIAL

## 2023-04-24 LAB
% BASOPHILS, CSF: 1 %
ABO/RH(D): NORMAL
ANION GAP SERPL CALCULATED.3IONS-SCNC: 13 MMOL/L (ref 7–15)
ANTIBODY SCREEN: NEGATIVE
APPEARANCE CSF: CLEAR
APTT PPP: 34 SECONDS (ref 22–38)
BUN SERPL-MCNC: 17.3 MG/DL (ref 6–20)
CALCIUM SERPL-MCNC: 9.3 MG/DL (ref 8.6–10)
CHLORIDE SERPL-SCNC: 102 MMOL/L (ref 98–107)
COLOR CSF: COLORLESS
CREAT SERPL-MCNC: 0.72 MG/DL (ref 0.67–1.17)
DEPRECATED HCO3 PLAS-SCNC: 22 MMOL/L (ref 22–29)
ERYTHROCYTE [DISTWIDTH] IN BLOOD BY AUTOMATED COUNT: 13.7 % (ref 10–15)
GFR SERPL CREATININE-BSD FRML MDRD: >90 ML/MIN/1.73M2
GLUCOSE CSF-MCNC: 61 MG/DL (ref 40–70)
GLUCOSE SERPL-MCNC: 93 MG/DL (ref 70–99)
HCT VFR BLD AUTO: 42.8 % (ref 40–53)
HGB BLD-MCNC: 13.6 G/DL (ref 13.3–17.7)
INR PPP: 1.1 (ref 0.85–1.15)
LYMPH ABN NFR CSF MANUAL: 61 %
LYMPH ABN NFR CSF MANUAL: 75 %
MCH RBC QN AUTO: 28.9 PG (ref 26.5–33)
MCHC RBC AUTO-ENTMCNC: 31.8 G/DL (ref 31.5–36.5)
MCV RBC AUTO: 91 FL (ref 78–100)
MONOS+MACROS NFR CSF MANUAL: 25 %
MONOS+MACROS NFR CSF MANUAL: 35 %
NEUTROPHILS NFR CSF MANUAL: NORMAL %
NEUTROPHILS NFR CSF MANUAL: NORMAL %
OTHER CELLS CSF: 3 %
PLATELET # BLD AUTO: 425 10E3/UL (ref 150–450)
POTASSIUM SERPL-SCNC: 4 MMOL/L (ref 3.4–5.3)
PROT CSF-MCNC: 27.5 MG/DL (ref 15–45)
RBC # BLD AUTO: 4.71 10E6/UL (ref 4.4–5.9)
RBC # CSF MANUAL: 0 /UL (ref 0–2)
SODIUM SERPL-SCNC: 137 MMOL/L (ref 136–145)
SPECIMEN EXPIRATION DATE: NORMAL
TUBE # CSF: ABNORMAL
WBC # BLD AUTO: 8.1 10E3/UL (ref 4–11)
WBC # CSF MANUAL: 76 /UL (ref 0–5)

## 2023-04-24 PROCEDURE — 250N000009 HC RX 250

## 2023-04-24 PROCEDURE — 250N000011 HC RX IP 250 OP 636

## 2023-04-24 PROCEDURE — 87040 BLOOD CULTURE FOR BACTERIA: CPT

## 2023-04-24 PROCEDURE — 87075 CULTR BACTERIA EXCEPT BLOOD: CPT

## 2023-04-24 PROCEDURE — 999N000065 XR CHEST PORT 1 VIEW

## 2023-04-24 PROCEDURE — 87205 SMEAR GRAM STAIN: CPT

## 2023-04-24 PROCEDURE — 85610 PROTHROMBIN TIME: CPT

## 2023-04-24 PROCEDURE — 99232 SBSQ HOSP IP/OBS MODERATE 35: CPT | Mod: FS | Performed by: PHYSICIAN ASSISTANT

## 2023-04-24 PROCEDURE — 84157 ASSAY OF PROTEIN OTHER: CPT

## 2023-04-24 PROCEDURE — 258N000003 HC RX IP 258 OP 636: Performed by: NEUROLOGICAL SURGERY

## 2023-04-24 PROCEDURE — 89051 BODY FLUID CELL COUNT: CPT

## 2023-04-24 PROCEDURE — 71045 X-RAY EXAM CHEST 1 VIEW: CPT | Mod: 26 | Performed by: RADIOLOGY

## 2023-04-24 PROCEDURE — 36415 COLL VENOUS BLD VENIPUNCTURE: CPT

## 2023-04-24 PROCEDURE — 250N000013 HC RX MED GY IP 250 OP 250 PS 637: Performed by: STUDENT IN AN ORGANIZED HEALTH CARE EDUCATION/TRAINING PROGRAM

## 2023-04-24 PROCEDURE — 36569 INSJ PICC 5 YR+ W/O IMAGING: CPT

## 2023-04-24 PROCEDURE — 87076 CULTURE ANAEROBE IDENT EACH: CPT | Performed by: STUDENT IN AN ORGANIZED HEALTH CARE EDUCATION/TRAINING PROGRAM

## 2023-04-24 PROCEDURE — 250N000013 HC RX MED GY IP 250 OP 250 PS 637: Performed by: NURSE PRACTITIONER

## 2023-04-24 PROCEDURE — 87070 CULTURE OTHR SPECIMN AEROBIC: CPT

## 2023-04-24 PROCEDURE — 272N000450 HC KIT 4FR POWER PICC SINGLE LUMEN

## 2023-04-24 PROCEDURE — 82945 GLUCOSE OTHER FLUID: CPT

## 2023-04-24 PROCEDURE — 272N000473 HC KIT, VPS RHYTHM STYLET

## 2023-04-24 PROCEDURE — 250N000013 HC RX MED GY IP 250 OP 250 PS 637

## 2023-04-24 PROCEDURE — 120N000002 HC R&B MED SURG/OB UMMC

## 2023-04-24 PROCEDURE — 250N000011 HC RX IP 250 OP 636: Performed by: NEUROLOGICAL SURGERY

## 2023-04-24 PROCEDURE — 86850 RBC ANTIBODY SCREEN: CPT

## 2023-04-24 PROCEDURE — 85730 THROMBOPLASTIN TIME PARTIAL: CPT

## 2023-04-24 PROCEDURE — 80048 BASIC METABOLIC PNL TOTAL CA: CPT

## 2023-04-24 PROCEDURE — 85018 HEMOGLOBIN: CPT

## 2023-04-24 RX ORDER — LIDOCAINE 40 MG/G
CREAM TOPICAL
Status: ACTIVE | OUTPATIENT
Start: 2023-04-24 | End: 2023-04-27

## 2023-04-24 RX ORDER — ACETAMINOPHEN 325 MG/1
975 TABLET ORAL ONCE
Status: COMPLETED | OUTPATIENT
Start: 2023-04-24 | End: 2023-04-25

## 2023-04-24 RX ORDER — HEPARIN SODIUM,PORCINE 10 UNIT/ML
5-20 VIAL (ML) INTRAVENOUS EVERY 24 HOURS
Status: DISCONTINUED | OUTPATIENT
Start: 2023-04-24 | End: 2023-05-03 | Stop reason: HOSPADM

## 2023-04-24 RX ORDER — HEPARIN SODIUM,PORCINE 10 UNIT/ML
5-20 VIAL (ML) INTRAVENOUS
Status: DISCONTINUED | OUTPATIENT
Start: 2023-04-24 | End: 2023-05-03 | Stop reason: HOSPADM

## 2023-04-24 RX ADMIN — Medication 5 ML: at 17:09

## 2023-04-24 RX ADMIN — Medication 99 MG: at 08:31

## 2023-04-24 RX ADMIN — ROPINIROLE HYDROCHLORIDE 0.25 MG: 0.25 TABLET, FILM COATED ORAL at 21:05

## 2023-04-24 RX ADMIN — CEFTRIAXONE SODIUM 2 G: 2 INJECTION, POWDER, FOR SOLUTION INTRAMUSCULAR; INTRAVENOUS at 03:55

## 2023-04-24 RX ADMIN — CEFTRIAXONE SODIUM 2 G: 2 INJECTION, POWDER, FOR SOLUTION INTRAMUSCULAR; INTRAVENOUS at 16:04

## 2023-04-24 RX ADMIN — VANCOMYCIN HYDROCHLORIDE 1250 MG: 10 INJECTION, POWDER, LYOPHILIZED, FOR SOLUTION INTRAVENOUS at 00:53

## 2023-04-24 RX ADMIN — VANCOMYCIN HYDROCHLORIDE 1250 MG: 10 INJECTION, POWDER, LYOPHILIZED, FOR SOLUTION INTRAVENOUS at 08:26

## 2023-04-24 RX ADMIN — LIDOCAINE HYDROCHLORIDE 5 ML: 10 INJECTION, SOLUTION EPIDURAL; INFILTRATION; INTRACAUDAL; PERINEURAL at 15:08

## 2023-04-24 RX ADMIN — Medication 3 MG: at 21:05

## 2023-04-24 RX ADMIN — CITALOPRAM HYDROBROMIDE 20 MG: 20 TABLET ORAL at 08:31

## 2023-04-24 ASSESSMENT — ACTIVITIES OF DAILY LIVING (ADL)
ADLS_ACUITY_SCORE: 18
DEPENDENT_IADLS:: INDEPENDENT
ADLS_ACUITY_SCORE: 18

## 2023-04-24 NOTE — PROGRESS NOTES
Care Management Initial Consult    General Information  Assessment completed with: Edwin Johnson  Type of CM/SW Visit: Initial Assessment    Primary Care Provider verified and updated as needed: Yes   Readmission within the last 30 days:     Reason for Consult: discharge planning  Advance Care Planning:         Communication Assessment  Patient's communication style: spoken language (English or Bilingual)    Hearing Difficulty or Deaf: no   Wear Glasses or Blind: no    Cognitive  Cognitive/Neuro/Behavioral: WDL  Level of Consciousness: alert  Arousal Level: opens eyes spontaneously  Orientation: oriented x 4  Mood/Behavior: calm, cooperative  Best Language: 0 - No aphasia  Speech: clear, spontaneous    Living Environment:   People in home: spouse, child(bernabe), dependent  Carmella  Current living Arrangements: house      Able to return to prior arrangements: yes    Family/Social Support:  Care provided by: self, spouse/significant other  Provides care for: child(bernabe)  Marital Status:   Wife  Carmella       Description of Support System: Involved, Supportive    Support Assessment: Adequate family and caregiver support    Current Resources:   Patient receiving home care services: No     Community Resources: None  Equipment currently used at home: none  Supplies currently used at home: None    Employment/Financial:  Employment Status: employed full-time       Functional Status:  Prior to admission patient needed assistance:   Dependent ADLs:: Independent  Dependent IADLs:: Independent    Additional Information:  Patient status reviewed, discussed in discharge rounds. Plan for patient to go to OR tomorrow for shunt removal. Patient may need IV antibiotics at discharge, plan to place PICC today.    Met with patient to complete initial care management assessment. Patient currently lives in a house in [place] with his wife and 15 yo son. He denies assistive devices, is independent with ADLs/IADLs at baseline,  "works on a gas line. He denies current home or community services.     Discussed potential for IV antibiotics at discharge. Patient agrees with referral being sent to [facility] to check benefits. Referral sent at this time.     Per [facility]: \"Pt has IV abx coverage through [employer] plan, ded $200  (met in full), 80/20 coverage, OOP $4000 (met $1556.73 at this time), once OOP is fully met, coverage should be at 100%.\"    [facility] - referral sent for home IV antibiotics  Ph: [phone]  Fax: [phone]    Xena Del Rio, RN, BSN  6A RN Care Coordinator  Ph: [phone]   Pager: [phone]  "

## 2023-04-24 NOTE — PROCEDURES
[facility]    Single Lumen PICC Placement    Date/Time: 4/24/2023 2:52 PM    Performed by: Maicol Mason RN  Authorized by: Lucia Vogt MD  Indications: vascular access      UNIVERSAL PROTOCOL   Site Marked: Yes  Prior Images Obtained and Reviewed:  Yes  Required items: Required blood products, implants, devices and special equipment available    Patient identity confirmed:  Verbally with patient, arm band, provided demographic data, hospital-assigned identification number and anonymous protocol, patient vented/unresponsive  NA - No sedation, light sedation, or local anesthesia  Confirmation Checklist:  Patient's identity using two indicators, relevant allergies, procedure was appropriate and matched the consent or emergent situation and correct equipment/implants were available  Time out: Immediately prior to the procedure a time out was called (Maicol)    Universal Protocol: the [facility] Universal Protocol was followed    Preparation: Patient was prepped and draped in usual sterile fashion        Preparation: skin prepped with ChloraPrep  Skin prep agent: skin prep agent completely dried prior to procedure  Sterile barriers: maximum sterile barriers were used: cap, mask, sterile gown, sterile gloves, and large sterile sheet  Hand hygiene: hand hygiene performed prior to central venous catheter insertion  Type of line used: PICC  Catheter type: single lumen  Lumen type: non-valved and power PICC  Catheter size: 4 Fr  Brand: Bard  Lot number: [id number]  Placement method: venipuncture, MST, ultrasound and tip navigation system  Number of attempts: 1  Difficulty threading catheter: no  Successful placement: yes  Orientation: right  Catheter to Vein (%): 24  Location: basilic vein (0.61)  Tip Location: SVC  Arm circumference: adults 10 cm  Extremity circumference: 29  Visible catheter length: 0  Total catheter length: 47  Dressing and securement: adhesive  securement device, blood removed, alcohol impregnated caps, blood cleaned with CHG, chlorhexidine patch applied, fixation device, line secured, site cleansed, statlock, securement device and transparent securement dressing  Post procedure assessment: blood return through all ports, free fluid flow and placement verified by x-ray  PROCEDURE   Patient Tolerance:  Patient tolerated the procedure well with no immediate complicationsDescribe Procedure: Right basilic vein 0.61cm dm.0cm external.Placement verified by CXR.PICC okay to use.  Disposal: sharps and needle count correct at the end of procedure, needles and guidewire disposed in sharps container

## 2023-04-24 NOTE — PROVIDER NOTIFICATION
Lab called with critical value for CSF. 4/20 @ 1150. anarobic +1 cutibaterium acnes  4/21 @ 921 anarobic +1 cutibacterium acnes. NSG was called and notified.

## 2023-04-24 NOTE — PLAN OF CARE
Vitals: VSS except low BP  Neuros: AO x 4 and intact  IV: PIV saline lock, PICC to be placed  Labs/Electrolytes: CSF cultures positive for cutibacterium acnes   Resp/trach: Lungs clear bilaterally on RA  Diet: Reg  Bowel status: BM 4/24  : Voids spontaneously   Skin: Externalized  shunt on RU abdomen  Pain: Denies  Activity: Independent  Updates this shift: PICC line placed, extravasation kit delayed to unit, procedure further delayed.    Aleksandra and Krystin

## 2023-04-24 NOTE — PROGRESS NOTES
[facility], [place]   04/24/2023  Neurosurgery Progress Note:    Assessment:  Edwin Clements is a 48-year-old man presenting with symptoms concerning for elevated intracranial pressure (progressive HA, blurry vision), with recent CT abdomen showing pseudocyst surrounding the distal catheter.      Patient is POD-6 s/p bedside externalization of the shunt.    Plan:  - Follow CSF and catheter tip cultures: CSF + for diphtheroids  - On vancomycin and ceftriaxone  - ID following- appreciate recommendations  - OR scheduled for Tuesday 4/25/2023 for shunt removal  - Okay for PICC line if needs  - Monitor for fevers/chills/ severe headaches  - Serial neuro exams  - Pain control  - HOB > 30 degrees  - Advance diet as tolerates  - Bowel regimen  - PRN antiemetics  - IVF until taking adequate PO  - PT/OT  - SCDs for DVT proph  -----------------------------------  Marcin Gastelum  Neurosurgery Resident PGY2    Please contact neurosurgery resident on call with questions.    Dial * * [phone], enter [phone] when prompted.      Interval History: No acute events overnights. Denies headaches,  fever, chills, muscle aches or vision changes. CSF sample to be sent again today. Stable neurological exam.    Objective:   Temp:  [97.9  F (36.6  C)-98.5  F (36.9  C)] 98.1  F (36.7  C)  Pulse:  [60-69] 62  Resp:  [12-18] 12  BP: ()/(63-71) 93/63  SpO2:  [97 %-99 %] 98 %  I/O last 3 completed shifts:  In: 550 [P.O.:300; I.V.:250]  Out: 455 [Other:455]    Gen: Appears comfortable, NAD  Neurologic:  - Alert & Oriented to person, place, time, and situation  - Follows commands briskly  - Speech fluent, spontaneous. No aphasia or dysarthria.  - No gaze preference. No apparent hemineglect.  - PERRL, EOMI  - Face symmetric with sensation intact to light touch  - Palate elevates symmetrically, uvula midline, tongue protrudes midline  - Trapezii muscles 5/5 bilaterally  - No pronator drift     Del Tr Bi WE WF Gr   R 5 5 5 5 5  5   L 5 5 5 5 5 5    HF KE KF DF PF EHL   R 5 5 5 5 5 5   L 5 5 5 5 5 5     Reflexes 2+ throughout    Sensation intact and symmetric to light touch throughout    LABS:  Recent Labs   Lab 04/24/23  0729 04/23/23  0741 04/22/23  0813    135* 135*   POTASSIUM 4.0 4.0 4.1   CHLORIDE 102 101 101   CO2 22 22 22   ANIONGAP 13 12 12   GLC 93 91 91   BUN 17.3 16.7 17.0   CR 0.72 0.70 0.64*   NIKO 9.3 9.4 9.6       Recent Labs   Lab 04/24/23  0729   WBC 8.1   RBC 4.71   HGB 13.6   HCT 42.8   MCV 91   MCH 28.9   MCHC 31.8   RDW 13.7          IMAGING:  No results found for this or any previous visit (from the past 24 hour(s)).

## 2023-04-24 NOTE — PLAN OF CARE
Status: Patient is POD-5 s/p bedside . externalization of the shunt. Admitted for HA with concern for shunt malfuntion. Hx of idiopathic intracranial HTN,  shunt ('92), shunt revision ('15).   Vitals: VSS on RA.   Neuros: Intact   IV: PIV SL in between IV abx. Vanco extravasation to LUE. Site marked, reddened. Warm packs applied and arm elevated per policy.   Labs/Electrolytes: . + CSF 4/20.   Resp/trach: Denies SOB. LSC.   Diet: Regular diet.   Bowel status: LBM 4/22.   : Voiding w/o difficulty.   Skin: Externalized  shunt at R upper abd,  covered with dressing. EVD Q3hrs.   Pain: Denies   Activity: Independent. Tangirnaq> 30. Pt up in chair this evening.   Plan: Continue monitor.Will need 24hr extravasation picture Monday at 1300. OR  scheduled for Tuesday for shunt removal.

## 2023-04-24 NOTE — ANESTHESIA PREPROCEDURE EVALUATION
"[facility]       H&P NOTE    HPI: Edwin Clements is a 48 year old male who was seen for headache with concern for shunt malfunction.  He has a history of idiopathic intracranial hypertension, with  shunt placed in 1992.  His shunt was thereafter revised by Dr. Kyle in 2015 due to distal catheter fracture that resulted in a subcutaneous fluid collection on the patient's chest.  There is currently no available documentation in our electronic medical record regarding the exact model or setting of the shunt.    The patient was recently seen by Dr. Martínez via virtual visit approximately 1 week ago after outside hospital identified that the patient had a pseudocyst surrounding the distal catheter.  The patient had been feeling well up until that visit, so plan was made for distal catheter revision in the fall, at the patient's convenience.  However for the last several days the patient has started to have headaches that remind him of the type of headaches that he had prior to shunt being placed, and his vision is becoming blurry as well.  These are holocranial headaches that are worse behind his eyes and feel like his \"eyes are going to explode.\"  They have been increasing in intensity over the last few days.  Previous to the pseudocyst being identified, the patient had significant abdominal pain, which has now subsided.  No significant fever, although this morning he reports his temperature was 100 degrees.  No other infectious symptoms.  No nausea no vomiting, no double vision.        PAST MEDICAL HISTORY:   Past Medical History:   Diagnosis Date     Hydrocephalus (H)      Hypertension      Pseudotumor cerebri        PAST SURGICAL HISTORY:   Past Surgical History:   Procedure Laterality Date     APPENDECTOMY       HC OR CATH ABLATION NON-CARDIAC ENDOVASCULAR       IMPLANT SHUNT VENTRICULOPERITONEAL       PICC SINGLE LUMEN PLACEMENT Right 04/24/2023    Right basilic vein " 47cm total 0cm external.     REVISE SHUNT VENTRICULARPERITONEAL Right 03/26/2015    Procedure: REVISE SHUNT VENTRICULARPERITONEAL;  Surgeon: Ulisses Kyle MD;  Location:  OR       FAMILY HISTORY: No family history on file.    SOCIAL HISTORY:   Social History     Tobacco Use     Smoking status: Former     Smokeless tobacco: Not on file   Vaping Use     Vaping status: Not on file   Substance Use Topics     Alcohol use: Yes       MEDICATIONS:  (Not in a hospital admission)      Allergies:  No Known Allergies    ROS: 10 point ROS of systems including Constitutional, Eyes, Respiratory, Cardiovascular, Gastroenterology, Genitourinary, Integumentary, Muscularskeletal, Psychiatric were all negative except for pertinent positives noted in my HPI.    Physical exam:   There were no vitals taken for this visit.  CV: RRR, no murmurs, rubs, or gallops  PULM: breathing comfortably on room air  ABD: soft, non-distended, BS+  NEUROLOGIC:  -- Awake; Alert; oriented x 3  -- Follows commands briskly  -- +repetition, calculation, and naming  -- Speech fluent, spontaneous. No aphasia or dysarthria.  -- no gaze preference. No apparent hemineglect.  Cranial Nerves:  -- visual fields full to confrontation, PERRL 3-2mm bilat and brisk, extraocular movements intact  -- face symmetrical, tongue midline  -- sensory V1-V3 intact bilaterally  -- palate elevates symmetrically, uvula midline  -- hearing grossly intact bilat  -- Trapezii 5/5 strength bilat symmetric    Motor:  Normal bulk / tone; no tremor, rigidity, or bradykinesia.  No muscle wasting or fasciculations  No Pronator Drift     Delt Bi Tri Hand Flexion/  Extension Iliopsoas Quadriceps Hamstrings Tibialis Anterior Gastroc    C5 C6 C7 C8/T1 L2 L3 L4-S1 L4 S1   R 5 5 5 5 5 5 5 5 5   L 5 5 5 5 5 5 5 5 5   Sensory:  intact to LT x 4 extremities     Reflexes:     Bi Tri BR Ranjit Pat Ach    C5-6 C7-8 C6 UMN L2-4 S1   R 2+ 2+ 2+ Norm 2+ 2+   L 2+ 2+ 2+ Norm 2+ 2+       LABS:  Last  Comprehensive Metabolic Panel:  Sodium   Date Value Ref Range Status   04/24/2023 137 136 - 145 mmol/L Final   03/26/2015 136 133 - 144 mmol/L Final     Potassium   Date Value Ref Range Status   04/24/2023 4.0 3.4 - 5.3 mmol/L Final   03/26/2015 3.8 3.4 - 5.3 mmol/L Final     Chloride   Date Value Ref Range Status   04/24/2023 102 98 - 107 mmol/L Final   03/26/2015 103 94 - 109 mmol/L Final     Carbon Dioxide   Date Value Ref Range Status   03/26/2015 27 20 - 32 mmol/L Final     Carbon Dioxide (CO2)   Date Value Ref Range Status   04/24/2023 22 22 - 29 mmol/L Final     Anion Gap   Date Value Ref Range Status   04/24/2023 13 7 - 15 mmol/L Final   03/26/2015 6 3 - 14 mmol/L Final     Glucose   Date Value Ref Range Status   04/24/2023 93 70 - 99 mg/dL Final   03/26/2015 87 70 - 99 mg/dL Final     Comment:     Effective 7/30/2014, the reference range for this assay has changed to reflect   new instrumentation/methodology.       Urea Nitrogen   Date Value Ref Range Status   04/24/2023 17.3 6.0 - 20.0 mg/dL Final   03/26/2015 22 7 - 30 mg/dL Final     Comment:     Effective 7/30/2014, the reference range for this assay has changed to reflect   new instrumentation/methodology.       Creatinine   Date Value Ref Range Status   04/24/2023 0.72 0.67 - 1.17 mg/dL Final   03/26/2015 0.83 0.66 - 1.25 mg/dL Final     GFR Estimate   Date Value Ref Range Status   04/24/2023 >90 >60 mL/min/1.73m2 Final     Comment:     eGFR calculated using 2021 CKD-EPI equation.   03/26/2015 >90  Non  GFR Calc   >60 mL/min/1.7m2 Final     Calcium   Date Value Ref Range Status   04/24/2023 9.3 8.6 - 10.0 mg/dL Final   03/26/2015 9.3 8.5 - 10.1 mg/dL Final     Comment:     Effective 7/30/2014, the reference range for this assay has changed to reflect   new instrumentation/methodology.       Lab Results   Component Value Date    WBC 9.0 04/17/2023    WBC 7.6 03/26/2015     Lab Results   Component Value Date    RBC 4.60 04/17/2023    RBC  5.18 03/26/2015     Lab Results   Component Value Date    HGB 13.7 04/17/2023    HGB 16.3 03/26/2015     Lab Results   Component Value Date    HCT 42.0 04/17/2023    HCT 46.2 03/26/2015     Lab Results   Component Value Date    MCV 91 04/17/2023    MCV 89 03/26/2015     Lab Results   Component Value Date    MCH 29.8 04/17/2023    MCH 31.5 03/26/2015     Lab Results   Component Value Date    MCHC 32.6 04/17/2023    MCHC 35.3 03/26/2015     Lab Results   Component Value Date    RDW 13.9 04/17/2023    RDW 13.1 03/26/2015     Lab Results   Component Value Date     04/17/2023     03/26/2015         IMAGING:  Recent Results (from the past 24 hour(s))   CT Head w/o Contrast    Narrative    CT HEAD W/O CONTRAST 4/17/2023 3:16 PM    History: Shunt, increased pain; Headache; Existing HA disorder with  clinical progression; No known/automatically detected potential  contraindications to MRI     Comparison: 4/5/2023 CT head, 3/4/2015 CT head    Technique: Using multidetector thin collimation helical acquisition  technique, axial, coronal and sagittal CT images from the skull base  to the vertex were obtained without intravenous contrast.   (topogram) image(s) also obtained and reviewed.    Findings:   Stable position of the right ventriculostomy shunt with the tip  terminating in the third ventricle. The ventricular system is stable  in size when compared to the 4/5/2023 exam. Incidental midline  arachnoid cyst in the posterior fossa. No periventricular  hypoattenuation. There is no mass effect, midline shift, intracranial  hemorrhage or acute loss of gray-white differentiation.    The bony calvaria and the bones of the skull base are normal. The  visualized portions of the paranasal sinuses and mastoid air cells are  clear.       Impression    Impression:  1. Stable position of right ventriculostomy catheter.  2. Stable size of the ventricular system.  3. No acute intracranial findings.    I have personally  reviewed the examination and initial interpretation  and I agree with the findings.    JOSE VOGT MD         SYSTEM ID:  [id number]         ASSESSMENT:  Edwin Clements is a 48-year-old man presenting with symptoms concerning for elevated intracranial pressure (progressive HA, blurry vision), with recent CT abdomen showing pseudocyst surrounding the distal catheter.  Overall picture is concerning for shunt malfunction.    Clinically Significant Risk Factors Present on Admission                       # Overweight: Estimated body mass index is 28.05 kg/m  as calculated from the following:    Height as of 23: 1.829 m (6').    Weight as of 23: 93.8 kg (206 lb 12.7 oz).            RECOMMENDATIONS:    -Admit to NSGY service, floor status  -Bedside shunt tap, send CSF for culture  -Externalize distal catheter at bedside, send CSF for culture  -CBC, BMP, coags, CRP  -Ophthalmology consult for vision changes, assess for papilledema     The patient was discussed with Dr. Galaviz, neurosurgery chief resident, and Dr. Martínez, neurosurgery staff, and they agree with the above.    Lucia Vogt MD, PhD   PGY-1 Neurosurgery  Anesthesia Pre-Procedure Evaluation    Patient: Edwin Clements   MRN: [id number] : 1975        Procedure : Procedure(s):  REMOVAL, SHUNT, VENTRICULOPERITONEAL,  Stealth assisted placement of external venticular drain, right possible left          Past Medical History:   Diagnosis Date     Hydrocephalus (H)      Hypertension      Pseudotumor cerebri       Past Surgical History:   Procedure Laterality Date     APPENDECTOMY       HC OR CATH ABLATION NON-CARDIAC ENDOVASCULAR       IMPLANT SHUNT VENTRICULOPERITONEAL       PICC SINGLE LUMEN PLACEMENT Right 2023    Right basilic vein 47cm total 0cm external.     REVISE SHUNT VENTRICULARPERITONEAL Right 2015    Procedure: REVISE SHUNT VENTRICULARPERITONEAL;  Surgeon: Ulisses Kyle MD;  Location: [facility] OR      No Known Allergies    Social History     Tobacco Use     Smoking status: Former     Smokeless tobacco: Not on file   Vaping Use     Vaping status: Not on file   Substance Use Topics     Alcohol use: Yes      Wt Readings from Last 1 Encounters:   04/05/23 93.8 kg (206 lb 12.7 oz)        Anesthesia Evaluation            ROS/MED HX  ENT/Pulmonary:       Neurologic: Comment:  - history of idiopathic intracranial hypertension, with  shunt placed in 1992.     - shunt  in 2015 due to distal catheter fracture that resulted in a subcutaneous fluid collection on the patient's chest.    - Recent imaging showing pseudocyst surrounding the distal catheter.    -Recent headches    Pseudotumor cerebri      Cardiovascular:     (+) hypertension-----    METS/Exercise Tolerance:     Hematologic:       Musculoskeletal:       GI/Hepatic:       Renal/Genitourinary:       Endo:       Psychiatric/Substance Use:       Infectious Disease:       Malignancy:       Other:               OUTSIDE LABS:  CBC:   Lab Results   Component Value Date    WBC 8.1 04/24/2023    WBC 8.3 04/23/2023    HGB 13.6 04/24/2023    HGB 14.1 04/23/2023    HCT 42.8 04/24/2023    HCT 43.7 04/23/2023     04/24/2023     04/23/2023     BMP:   Lab Results   Component Value Date     04/24/2023     (L) 04/23/2023    POTASSIUM 4.0 04/24/2023    POTASSIUM 4.0 04/23/2023    CHLORIDE 102 04/24/2023    CHLORIDE 101 04/23/2023    CO2 22 04/24/2023    CO2 22 04/23/2023    BUN 17.3 04/24/2023    BUN 16.7 04/23/2023    CR 0.72 04/24/2023    CR 0.70 04/23/2023    GLC 93 04/24/2023    GLC 91 04/23/2023     COAGS:   Lab Results   Component Value Date    PTT 34 04/24/2023    INR 1.10 04/24/2023     POC: No results found for: BGM, HCG, HCGS  HEPATIC:   Lab Results   Component Value Date    ALBUMIN 4.2 04/17/2023    PROTTOTAL 7.7 04/17/2023    ALT 9 (L) 04/17/2023    AST 18 04/17/2023    ALKPHOS 90 04/17/2023    BILITOTAL 0.4 04/17/2023     OTHER:   Lab Results   Component Value  Date    NIKO 9.3 04/24/2023       Anesthesia Plan    ASA Status:  2      Anesthesia Type: General.     - Airway: ETT   Induction: Intravenous.   Maintenance: Balanced.        Consents            Postoperative Care       PONV prophylaxis: Ondansetron (or other 5HT-3), Dexamethasone or Solumedrol     Comments:                Leona Hatfield MD

## 2023-04-24 NOTE — PROVIDER NOTIFICATION
Extravasation kit was delayed being brought to unit. Pt currently getting PICC placed @ bedside, photo will happen after.

## 2023-04-24 NOTE — PLAN OF CARE
Status: POD #5 externalization of shunt. Admitted for HA with concern for shunt malfuntion. Hx of idiopathic intracranial HTN,  shunt ('92), shunt revision ('15)  Vitals: VSS on RA  Neuros: Intact  IV: PIV is SL in between IV abx  Labs/Electrolytes: CSF cultures have been coming back positive  Resp/trach: Lung sounds are clear  Diet: Reg  Bowel status: Bowel sounds are active, no BM overnight  : Voiding spontaneously  Skin: Externalized shunt site is WDL and CDI  Pain: Denied pain   Activity: Independent  Social: Pt was calm and cooperative   Plan: Will continue to monitor and follow POC, plan for shunt removal on Tuesday  Updates this shift: Pt seemed to rest well in between cares

## 2023-04-24 NOTE — PROGRESS NOTES
Therapy: IV abx  Insurance: BC   Ded: $200  Met: $200    Co-Insurance: 80/20  Max Out of Pocket: $4000  Met: $1556.73    In reference to admission on 4/17/23 to check IV abx coverage    Please contact Intake with any questions, [phone] or In Basket pool, [facility] ([phone]).

## 2023-04-24 NOTE — PROGRESS NOTES
"  [facility] PROGRESS NOTE     Patient:  Edwin Clements   Date of birth 1975, Medical record number [id number]  Date of Visit:  04/24/2023  Date of Admission: 4/17/2023  Consult Requester:Arnaud Martínez, *          Assessment and Plan:   ID Problem List   1. Cutibacterium acnes  shunt infection               -CSF cultures 4/17 positive. Subsequent CSF sampling remains abnormal  2. Abdominal loculated fluid collection c/f abscess associated with distal  shunt catheter  2. Headaches and blurry vision c/f shunt malfunction  3.  shunt for idiopathic intracranial HTN      RECOMMENDATION:  1. CSF gram stain still positive with GPBs daily, although WBC count in CSF decreasing. Multiple cultures from early in hospital stay growing C acnes (4/17,19 and 20).   2. Per NSG, shunt removal planned for 4/25. We request no hardware be replaced until culture clearance assured. Likely would need 7 days from shunt removal prior to reinsertion- however that will depend on culture clearance. Will follow and help guide when it is safe to replace shunt.   3. Please continue daily sampling of CSF to follow for culture clearance.   4. Continue IV Ceftriaxone 2g q12hrs (CNS coverage).   5. Okay to discontinue IV Vancomycin today with returned susceptibilities.      ASSESSMENT:  Edwin Clements is a 48 year old male with PMHx significant for idiopathic intracranial HTN s/p  shunt (1992) who presents to the ED with headache and pressure c/f shunt malfunction.      CT head w/o e/o abnormalities. CT CAP at OSH 3/21/23 with \"16.9 cm loculated fluid collection in left mid abdomen associated with tip of  shunt catheter..\" Repeat CT AP 4/5 with decreased size of loculated fluid collection around 14.6cm. Repeat CT AP 4/19 with further decrease in abdominal fluid collection to 10.4 cm. IR evaluated as to whether abdominal fluid collection could be sampled and unfortunately there was no safe window of " accesss for sampling or drain placement. Will need serial CT scans to monitor for resolution.     CSF sampled from  shunt continues to be positive via gram stain for GPBs quickly after collection which is concerning. Cell count appears to be downtrending. Cultures from CSF 4/17, 19 and 20 positive for C acnes. 4/21-24 cultures pending. Plan is for OR with NSG on 4/25 per note. Continues on Ceftriaxone. Okay to discontinue IV Vancomycin given pansensitive C acnes. We will follow IDSA guidelines regarding planning on replacement of  shunt- with C acnes, it is recommended to have a 7 day period after shunt removal IF cultures clear. If cultures remain positive, we would need to extend that window.      Recommendations discussed with primary team. ID will continue to follow.     Patient and plan discussed with Dr. Watt.     Leah Laughlin PA-C  Infectious Diseases  Pager #[phone]          Interim History and Events:   Feeling well today. Offers no complaints and endorses no new symptoms.          HPI:   Edwin Clements is a 48 year old male with PMHx significant for idiopathic intracranial HTN s/p  shunt (1992) who presents to the ED with headache and pressure c/f shunt malfunction.      Recently seen by [facility] outpatient ~ 1 week prior for findings of pseudocyst in abdomen surrounding distal end of  catheter. Initial plans were for revision of shunt later this year, however he presented to the ED 4/17/23 with headaches for several days at home with associated pressure behind eyes and blurry vision- which are similar symptoms to when shunt malfunctioned in the past. Of note, prior to discovery of pseudocyst, patient did have complaints of abdominal pain which he felt like went back as far as last year. He was having a bout of this abdominal pain (localized to RLQ) prior to his trip to [place] at the beginninig of March. It worsened while in [place] with distension of his abdomen and decreased appetite.  "Has since improved. Denies fever, chills, sweats, n/v/d, or URI symptoms. No rashes or lesions on skin.      Lives in [place], [place]. 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 Denied consuming raw meat. No recent travel outside of trip to [place]- stayed at all inclusive resort and did not leave the resort or consume local foods.            ROS:   -Focused 5 point ROS completed, pertinent positives and negatives listed above.    Physical Examination:  Temp: 98.1  F (36.7  C) Temp src: Oral BP: 93/63 Pulse: 62   Resp: 12 SpO2: 98 % O2 Device: None (Room air)      There were no vitals filed for this visit.    Constitutional: Pleasant male seen sitting up in recliner chair, in NAD. Alert and interactive.   HEENT: NCAT, anicteric sclerae, conjunctiva clear. Moist mucous membranes.  Respiratory: Non-labored breathing on RA.   GI: Abdomen is soft, non-distended.  Skin: Warm and dry. No rashes.  Musculoskeletal: Extremities grossly normal.  Neurologic: A &O x3, speech normal, answering questions appropriately. Moves all extremities spontaneously. Grossly non-focal.  Neuropsychiatric: Mentation and affect normal/appropriate.  VAD: PIV      Medications:    cefTRIAXone  2 g Intravenous Q12H     citalopram  20 mg Oral Daily     hydrochlorothiazide  25 mg Oral Daily     lisinopril  40 mg Oral Daily     metoprolol succinate ER  100 mg Oral Daily     Potassium Gluconate  99 mg Oral Daily     vancomycin  1,250 mg Intravenous Q8H       Infusions/Drips:      Laboratory Data:   No results found for: ACD4    Inflammatory Markers    No lab results found.    Metabolic Studies       Recent Labs   Lab Test 04/24/23  0729 04/23/23  0741 04/22/23  0813 04/21/23  0559 04/20/23  1104 04/19/23  0807    135* 135* 130* 134* 135*   POTASSIUM 4.0 4.0 4.1 4.0 4.3 3.9   CHLORIDE 102 101 101 96* 100 98   CO2 22 22 22 23 22 23   ANIONGAP 13 12 12 11 12 14   BUN 17.3 " 16.7 17.0 16.1 11.4 10.3   CR 0.72 0.70 0.64* 0.81 0.62* 0.60*   GFRESTIMATED >90 >90 >90 >90 >90 >90   GLC 93 91 91 103* 104* 90   NIKO 9.3 9.4 9.6 9.8 9.8 10.0       Hepatic Studies    Recent Labs   Lab Test 04/17/23  1543   BILITOTAL 0.4   ALKPHOS 90   ALBUMIN 4.2   AST 18   ALT 9*       Pancreatitis testing    No lab results found.    Hematology Studies      Recent Labs   Lab Test 04/24/23  0729 04/23/23  0741 04/22/23  0813 04/21/23  0559 04/20/23  1104 04/19/23  0807   WBC 8.1 8.3 7.1 7.9 8.1 7.3   HGB 13.6 14.1 14.9 14.3 14.8 14.7   HCT 42.8 43.7 45.8 44.8 45.6 45.9    416 384 391 403 361     CSF testing     Recent Labs   Lab Test 04/23/23  1017 04/22/23  1150 04/22/23  1148 04/21/23  0921 04/20/23  1150 04/20/23  0953 04/19/23  0934 04/17/23  1831   CWBC 21* 36*  --   --  206* 123* 223* 4   CNEU  --  2  --   --  3 2 6  --    CLYM 61 84  --   --  78 85 79  --    CMONO 35 14  --   --  19 13 15  --    COTH 3  --   --   --   --   --   --   --    CRBC 0 5*  --   --  0 4* 4* 1   CGLU 58  --  72* 68 63 69 67 57   CTP 24.2  --  25.7 23.1 27.5 21.7 24.1 7.7*         Microbiology:  Culture   Date Value Ref Range Status   04/23/2023 No growth after 12 hours  Preliminary   04/23/2023 No growth after 12 hours  Preliminary   04/23/2023 No growth, less than 1 day  Preliminary   04/22/2023 No anaerobic organisms isolated after 1 day  Preliminary   04/22/2023 No growth, less than 1 day  Preliminary   04/21/2023 No anaerobic organisms isolated after 2 days  Preliminary   04/21/2023 No growth after 2 days  Preliminary   04/20/2023 No growth after 3 days  Preliminary   04/20/2023 No anaerobic organisms isolated after 3 days  Preliminary   04/20/2023 Culture in progress  Preliminary   04/20/2023 1+ Cutibacterium (Propionibacterium) acnes (AA)  Preliminary     Comment:     Susceptibilities done on previous cultures   04/20/2023 No growth after 3 days  Preliminary   04/19/2023 Culture in progress  Preliminary   04/19/2023 1+  Cutibacterium (Propionibacterium) acnes (AA)  Preliminary     Comment:     Susceptibilities done on previous cultures   04/19/2023 (AA)  Final    Isolated in broth only Gram positive bacilli, resembling diphtheroids     Comment:     On day 2 of incubationSee anaerobic report for identification   04/18/2023 No Growth  Final   04/17/2023 (AA)  Final    Isolated in broth only Gram positive bacilli, resembling diphtheroids     Comment:     On day 2 of incubationSee anaerobic report for identification   04/17/2023 1+ Cutibacterium (Propionibacterium) acnes (AA)  Preliminary       Last check of C difficile  No results found for: CDBPCT    Imaging:  IMAGING  CT AP w/o contrast (4/19/23)  IMPRESSION:   1. Externalized ventriculoperitoneal shunt with decreased size of the  central abdominal fluid collection measuring 10.4 cm in greatest  dimension (previously 15.6 cm). No free air.  2. Stable remaining exam.     CT head (4/17/23)   Impression:  1. Stable position of right ventriculostomy catheter.  2. Stable size of the ventricular system.  3. No acute intracranial findings.     CT AP w/o contrast (4/5/23)  IMPRESSION:   1. Decreased size of the loculated fluid collection in the central  abdomen about the ventriculoperitoneal shunt tip measuring 14.6 cm in  greatest dimension (previously 19.9 cm).   2. Stable 9 mm hemorrhagic/proteinaceous renal cyst in the right lower  pole.  3. Additional incidental/chronic findings as noted above.     OSH imaging:   CT AP w/ contrast (3/21/23)  IMPRESSION:   1.  Moderately large 16.9 cm loculated fluid collection in the left mid abdomen associated with the tip of the ventriculoperitoneal shunt catheter which results in mass effect on small bowel and could be a source of intermittent partial small bowel obstruction with a caliber change at this level without additional significant bowel dilatation is suggested high-grade obstruction. An infected fluid collection would be less likely given  the lack inflammatory changes marginating the loculated fluid collection.     2.  8 mm exophytic right renal lesion anteriorly which may be a small hyperdense renal cyst or possibly small solid renal mass. Further evaluation with renal ultrasound is suggested.

## 2023-04-24 NOTE — PROVIDER NOTIFICATION
Lab called with critical value for his CSF. 1+ gram positive with basili and 3+ WBC. NSG was called and notified.

## 2023-04-24 NOTE — ANESTHESIA PREPROCEDURE EVALUATION
Anesthesia Pre-Procedure Evaluation    Patient: Edwin Clements   MRN: [id number] : 1975        Procedure : Procedure(s):  REMOVAL, SHUNT, VENTRICULOPERITONEAL,  Stealth assisted placement of external venticular drain, right possible left          Past Medical History:   Diagnosis Date     Hydrocephalus (H)      Hypertension      Pseudotumor cerebri       Past Surgical History:   Procedure Laterality Date     APPENDECTOMY       HC OR CATH ABLATION NON-CARDIAC ENDOVASCULAR       IMPLANT SHUNT VENTRICULOPERITONEAL       PICC SINGLE LUMEN PLACEMENT Right 2023    Right basilic vein 47cm total 0cm external.     REVISE SHUNT VENTRICULARPERITONEAL Right 2015    Procedure: REVISE SHUNT VENTRICULARPERITONEAL;  Surgeon: Ulisses Kyle MD;  Location: [facility] OR      No Known Allergies   Social History     Tobacco Use     Smoking status: Former     Smokeless tobacco: Not on file   Vaping Use     Vaping status: Not on file   Substance Use Topics     Alcohol use: Yes      Wt Readings from Last 1 Encounters:   23 93.8 kg (206 lb 12.7 oz)        Anesthesia Evaluation   Pt has had prior anesthetic. Type: General and MAC.        ROS/MED HX  ENT/Pulmonary:     (+) allergic rhinitis, tobacco use, Past use,     Neurologic: Comment:  - history of idiopathic intracranial hypertension, with  shunt placed in .     - shunt  in  due to distal catheter fracture that resulted in a subcutaneous fluid collection on the patient's chest.    - Recent imaging showing pseudocyst surrounding the distal catheter.    -Recent headches    Pseudotumor cerebri      Cardiovascular:     (+) hypertension-----    METS/Exercise Tolerance:     Hematologic:  - neg hematologic  ROS     Musculoskeletal:  - neg musculoskeletal ROS     GI/Hepatic:  - neg GI/hepatic ROS     Renal/Genitourinary:  - neg Renal ROS     Endo:  - neg endo ROS     Psychiatric/Substance Use:       Infectious Disease:  - neg infectious disease ROS      Malignancy:  - neg malignancy ROS     Other:            Physical Exam    Airway        Mallampati: III   TM distance: > 3 FB   Neck ROM: full     Respiratory Devices and Support         Dental       (+) Minor Abnormalities - some fillings, tiny chips      Cardiovascular          Rhythm and rate: regular and normal     Pulmonary   pulmonary exam normal                OUTSIDE LABS:  CBC:   Lab Results   Component Value Date    WBC 8.1 04/24/2023    WBC 8.3 04/23/2023    HGB 13.6 04/24/2023    HGB 14.1 04/23/2023    HCT 42.8 04/24/2023    HCT 43.7 04/23/2023     04/24/2023     04/23/2023     BMP:   Lab Results   Component Value Date     04/24/2023     (L) 04/23/2023    POTASSIUM 4.0 04/24/2023    POTASSIUM 4.0 04/23/2023    CHLORIDE 102 04/24/2023    CHLORIDE 101 04/23/2023    CO2 22 04/24/2023    CO2 22 04/23/2023    BUN 17.3 04/24/2023    BUN 16.7 04/23/2023    CR 0.72 04/24/2023    CR 0.70 04/23/2023    GLC 93 04/24/2023    GLC 91 04/23/2023     COAGS:   Lab Results   Component Value Date    PTT 34 04/24/2023    INR 1.10 04/24/2023     POC: No results found for: BGM, HCG, HCGS  HEPATIC:   Lab Results   Component Value Date    ALBUMIN 4.2 04/17/2023    PROTTOTAL 7.7 04/17/2023    ALT 9 (L) 04/17/2023    AST 18 04/17/2023    ALKPHOS 90 04/17/2023    BILITOTAL 0.4 04/17/2023     OTHER:   Lab Results   Component Value Date    NIKO 9.3 04/24/2023       Anesthesia Plan    ASA Status:  3      Anesthesia Type: General.     - Airway: ETT   Induction: Intravenous.      Techniques and Equipment:     - Lines/Monitors: PICC in situ, 2nd IV     Consents    Anesthesia Plan(s) and associated risks, benefits, and realistic alternatives discussed. Questions answered and patient/representative(s) expressed understanding.     - Discussed: Risks, Benefits and Alternatives for BOTH SEDATION and the PROCEDURE were discussed     - Discussed with:  Patient, Spouse      - Extended Intubation/Ventilatory Support  Discussed: No.      - Patient is DNR/DNI Status: No    Use of blood products discussed: Yes.     - Discussed with: Patient.     - Consented: consented to blood products            Reason for refusal: other.     Postoperative Care    Pain management: IV analgesics, Oral pain medications.   PONV prophylaxis: Ondansetron (or other 5HT-3)     Comments:                Leona Hatfield MD

## 2023-04-25 ENCOUNTER — APPOINTMENT (OUTPATIENT)
Dept: CT IMAGING | Facility: CLINIC | Age: 48
End: 2023-04-25
Payer: COMMERCIAL

## 2023-04-25 ENCOUNTER — APPOINTMENT (OUTPATIENT)
Dept: CT IMAGING | Facility: CLINIC | Age: 48
End: 2023-04-25
Attending: STUDENT IN AN ORGANIZED HEALTH CARE EDUCATION/TRAINING PROGRAM
Payer: COMMERCIAL

## 2023-04-25 ENCOUNTER — ANESTHESIA (OUTPATIENT)
Dept: SURGERY | Facility: CLINIC | Age: 48
End: 2023-04-25
Payer: COMMERCIAL

## 2023-04-25 LAB
ANION GAP SERPL CALCULATED.3IONS-SCNC: 10 MMOL/L (ref 7–15)
BUN SERPL-MCNC: 17.4 MG/DL (ref 6–20)
CALCIUM SERPL-MCNC: 9.4 MG/DL (ref 8.6–10)
CHLORIDE SERPL-SCNC: 104 MMOL/L (ref 98–107)
CREAT SERPL-MCNC: 0.74 MG/DL (ref 0.67–1.17)
CRP SERPL-MCNC: 42.6 MG/L
DEPRECATED HCO3 PLAS-SCNC: 25 MMOL/L (ref 22–29)
ERYTHROCYTE [DISTWIDTH] IN BLOOD BY AUTOMATED COUNT: 13.7 % (ref 10–15)
GFR SERPL CREATININE-BSD FRML MDRD: >90 ML/MIN/1.73M2
GLUCOSE BLDC GLUCOMTR-MCNC: 89 MG/DL (ref 70–99)
GLUCOSE SERPL-MCNC: 104 MG/DL (ref 70–99)
HCT VFR BLD AUTO: 43.7 % (ref 40–53)
HGB BLD-MCNC: 14.1 G/DL (ref 13.3–17.7)
MCH RBC QN AUTO: 29.1 PG (ref 26.5–33)
MCHC RBC AUTO-ENTMCNC: 32.3 G/DL (ref 31.5–36.5)
MCV RBC AUTO: 90 FL (ref 78–100)
PLATELET # BLD AUTO: 473 10E3/UL (ref 150–450)
POTASSIUM SERPL-SCNC: 4.1 MMOL/L (ref 3.4–5.3)
RBC # BLD AUTO: 4.84 10E6/UL (ref 4.4–5.9)
SODIUM SERPL-SCNC: 139 MMOL/L (ref 136–145)
WBC # BLD AUTO: 8.4 10E3/UL (ref 4–11)

## 2023-04-25 PROCEDURE — 258N000003 HC RX IP 258 OP 636: Performed by: STUDENT IN AN ORGANIZED HEALTH CARE EDUCATION/TRAINING PROGRAM

## 2023-04-25 PROCEDURE — 272N000001 HC OR GENERAL SUPPLY STERILE: Performed by: NEUROLOGICAL SURGERY

## 2023-04-25 PROCEDURE — 250N000013 HC RX MED GY IP 250 OP 250 PS 637

## 2023-04-25 PROCEDURE — 250N000009 HC RX 250: Performed by: NEUROLOGICAL SURGERY

## 2023-04-25 PROCEDURE — 200N000002 HC R&B ICU UMMC

## 2023-04-25 PROCEDURE — 250N000011 HC RX IP 250 OP 636: Performed by: NEUROLOGICAL SURGERY

## 2023-04-25 PROCEDURE — 272N000480 CT HEAD W/O CONTRAST

## 2023-04-25 PROCEDURE — 87070 CULTURE OTHR SPECIMN AEROBIC: CPT | Performed by: NEUROLOGICAL SURGERY

## 2023-04-25 PROCEDURE — 250N000011 HC RX IP 250 OP 636: Performed by: STUDENT IN AN ORGANIZED HEALTH CARE EDUCATION/TRAINING PROGRAM

## 2023-04-25 PROCEDURE — 85027 COMPLETE CBC AUTOMATED: CPT

## 2023-04-25 PROCEDURE — 360N000079 HC SURGERY LEVEL 6, PER MIN: Performed by: NEUROLOGICAL SURGERY

## 2023-04-25 PROCEDURE — 250N000025 HC SEVOFLURANE, PER MIN: Performed by: NEUROLOGICAL SURGERY

## 2023-04-25 PROCEDURE — 00160J6 BYPASS CEREBRAL VENTRICLE TO PERITONEAL CAVITY WITH SYNTHETIC SUBSTITUTE, OPEN APPROACH: ICD-10-PCS | Performed by: NEUROLOGICAL SURGERY

## 2023-04-25 PROCEDURE — 009630Z DRAINAGE OF CEREBRAL VENTRICLE WITH DRAINAGE DEVICE, PERCUTANEOUS APPROACH: ICD-10-PCS | Performed by: NEUROLOGICAL SURGERY

## 2023-04-25 PROCEDURE — 250N000013 HC RX MED GY IP 250 OP 250 PS 637: Performed by: STUDENT IN AN ORGANIZED HEALTH CARE EDUCATION/TRAINING PROGRAM

## 2023-04-25 PROCEDURE — 99232 SBSQ HOSP IP/OBS MODERATE 35: CPT | Performed by: NURSE PRACTITIONER

## 2023-04-25 PROCEDURE — 80048 BASIC METABOLIC PNL TOTAL CA: CPT

## 2023-04-25 PROCEDURE — 272N000002 HC OR SUPPLY OTHER OPNP: Performed by: NEUROLOGICAL SURGERY

## 2023-04-25 PROCEDURE — 00P60JZ REMOVAL OF SYNTHETIC SUBSTITUTE FROM CEREBRAL VENTRICLE, OPEN APPROACH: ICD-10-PCS | Performed by: NEUROLOGICAL SURGERY

## 2023-04-25 PROCEDURE — 710N000011 HC RECOVERY PHASE 1, LEVEL 3, PER MIN: Performed by: NEUROLOGICAL SURGERY

## 2023-04-25 PROCEDURE — 278N000051 HC OR IMPLANT GENERAL: Performed by: NEUROLOGICAL SURGERY

## 2023-04-25 PROCEDURE — 8E0WXBZ COMPUTER ASSISTED PROCEDURE OF TRUNK REGION: ICD-10-PCS | Performed by: NEUROLOGICAL SURGERY

## 2023-04-25 PROCEDURE — 70450 CT HEAD/BRAIN W/O DYE: CPT | Mod: 77

## 2023-04-25 PROCEDURE — 250N000009 HC RX 250: Performed by: STUDENT IN AN ORGANIZED HEALTH CARE EDUCATION/TRAINING PROGRAM

## 2023-04-25 PROCEDURE — 370N000017 HC ANESTHESIA TECHNICAL FEE, PER MIN: Performed by: NEUROLOGICAL SURGERY

## 2023-04-25 PROCEDURE — 8E09XBG COMPUTER ASSISTED PROCEDURE OF HEAD AND NECK REGION, WITH COMPUTERIZED TOMOGRAPHY: ICD-10-PCS | Performed by: NEUROLOGICAL SURGERY

## 2023-04-25 PROCEDURE — 70450 CT HEAD/BRAIN W/O DYE: CPT | Mod: 26 | Performed by: RADIOLOGY

## 2023-04-25 PROCEDURE — 250N000011 HC RX IP 250 OP 636

## 2023-04-25 PROCEDURE — 36415 COLL VENOUS BLD VENIPUNCTURE: CPT

## 2023-04-25 PROCEDURE — 999N000141 HC STATISTIC PRE-PROCEDURE NURSING ASSESSMENT: Performed by: NEUROLOGICAL SURGERY

## 2023-04-25 PROCEDURE — 62256 REMOVE BRAIN CAVITY SHUNT: CPT | Mod: GC | Performed by: NEUROLOGICAL SURGERY

## 2023-04-25 PROCEDURE — 86140 C-REACTIVE PROTEIN: CPT | Performed by: NURSE PRACTITIONER

## 2023-04-25 RX ORDER — AMOXICILLIN 250 MG
1 CAPSULE ORAL 2 TIMES DAILY
Status: DISCONTINUED | OUTPATIENT
Start: 2023-04-25 | End: 2023-05-02

## 2023-04-25 RX ORDER — POLYETHYLENE GLYCOL 3350 17 G/17G
17 POWDER, FOR SOLUTION ORAL DAILY
Status: DISCONTINUED | OUTPATIENT
Start: 2023-04-26 | End: 2023-05-02

## 2023-04-25 RX ORDER — FENTANYL CITRATE 50 UG/ML
INJECTION, SOLUTION INTRAMUSCULAR; INTRAVENOUS PRN
Status: DISCONTINUED | OUTPATIENT
Start: 2023-04-25 | End: 2023-04-25

## 2023-04-25 RX ORDER — FENTANYL CITRATE 50 UG/ML
50 INJECTION, SOLUTION INTRAMUSCULAR; INTRAVENOUS EVERY 5 MIN PRN
Status: DISCONTINUED | OUTPATIENT
Start: 2023-04-25 | End: 2023-04-25 | Stop reason: HOSPADM

## 2023-04-25 RX ORDER — LIDOCAINE 40 MG/G
CREAM TOPICAL
Status: DISCONTINUED | OUTPATIENT
Start: 2023-04-25 | End: 2023-05-02

## 2023-04-25 RX ORDER — OXYCODONE HYDROCHLORIDE 5 MG/1
5 TABLET ORAL EVERY 4 HOURS PRN
Status: DISCONTINUED | OUTPATIENT
Start: 2023-04-25 | End: 2023-05-02

## 2023-04-25 RX ORDER — LIDOCAINE HYDROCHLORIDE 20 MG/ML
INJECTION, SOLUTION INFILTRATION; PERINEURAL PRN
Status: DISCONTINUED | OUTPATIENT
Start: 2023-04-25 | End: 2023-04-25

## 2023-04-25 RX ORDER — LIDOCAINE HYDROCHLORIDE AND EPINEPHRINE 10; 10 MG/ML; UG/ML
INJECTION, SOLUTION INFILTRATION; PERINEURAL PRN
Status: DISCONTINUED | OUTPATIENT
Start: 2023-04-25 | End: 2023-04-25 | Stop reason: HOSPADM

## 2023-04-25 RX ORDER — BISACODYL 10 MG
10 SUPPOSITORY, RECTAL RECTAL DAILY PRN
Status: DISCONTINUED | OUTPATIENT
Start: 2023-04-25 | End: 2023-04-26

## 2023-04-25 RX ORDER — FLUMAZENIL 0.1 MG/ML
0.2 INJECTION, SOLUTION INTRAVENOUS
Status: ACTIVE | OUTPATIENT
Start: 2023-04-25 | End: 2023-04-27

## 2023-04-25 RX ORDER — SODIUM CHLORIDE 9 MG/ML
INJECTION, SOLUTION INTRAVENOUS CONTINUOUS
Status: DISCONTINUED | OUTPATIENT
Start: 2023-04-25 | End: 2023-04-26

## 2023-04-25 RX ORDER — CEFAZOLIN SODIUM/WATER 2 G/20 ML
SYRINGE (ML) INTRAVENOUS PRN
Status: DISCONTINUED | OUTPATIENT
Start: 2023-04-25 | End: 2023-04-25

## 2023-04-25 RX ORDER — HYDRALAZINE HYDROCHLORIDE 20 MG/ML
10-20 INJECTION INTRAMUSCULAR; INTRAVENOUS EVERY 30 MIN PRN
Status: DISCONTINUED | OUTPATIENT
Start: 2023-04-25 | End: 2023-05-02

## 2023-04-25 RX ORDER — ONDANSETRON 4 MG/1
4 TABLET, ORALLY DISINTEGRATING ORAL EVERY 6 HOURS PRN
Status: DISCONTINUED | OUTPATIENT
Start: 2023-04-25 | End: 2023-05-02

## 2023-04-25 RX ORDER — LABETALOL HYDROCHLORIDE 5 MG/ML
10-40 INJECTION, SOLUTION INTRAVENOUS EVERY 10 MIN PRN
Status: DISCONTINUED | OUTPATIENT
Start: 2023-04-25 | End: 2023-05-02

## 2023-04-25 RX ORDER — GABAPENTIN 300 MG/1
300 CAPSULE ORAL ONCE
Status: COMPLETED | OUTPATIENT
Start: 2023-04-25 | End: 2023-04-25

## 2023-04-25 RX ORDER — ONDANSETRON 4 MG/1
4 TABLET, ORALLY DISINTEGRATING ORAL EVERY 30 MIN PRN
Status: DISCONTINUED | OUTPATIENT
Start: 2023-04-25 | End: 2023-04-25 | Stop reason: HOSPADM

## 2023-04-25 RX ORDER — PROPOFOL 10 MG/ML
INJECTION, EMULSION INTRAVENOUS PRN
Status: DISCONTINUED | OUTPATIENT
Start: 2023-04-25 | End: 2023-04-25

## 2023-04-25 RX ORDER — SODIUM CHLORIDE, SODIUM LACTATE, POTASSIUM CHLORIDE, CALCIUM CHLORIDE 600; 310; 30; 20 MG/100ML; MG/100ML; MG/100ML; MG/100ML
INJECTION, SOLUTION INTRAVENOUS CONTINUOUS
Status: DISCONTINUED | OUTPATIENT
Start: 2023-04-25 | End: 2023-04-25 | Stop reason: HOSPADM

## 2023-04-25 RX ORDER — ONDANSETRON 2 MG/ML
INJECTION INTRAMUSCULAR; INTRAVENOUS PRN
Status: DISCONTINUED | OUTPATIENT
Start: 2023-04-25 | End: 2023-04-25

## 2023-04-25 RX ORDER — ONDANSETRON 2 MG/ML
4 INJECTION INTRAMUSCULAR; INTRAVENOUS EVERY 6 HOURS PRN
Status: DISCONTINUED | OUTPATIENT
Start: 2023-04-25 | End: 2023-05-02

## 2023-04-25 RX ORDER — HYDROMORPHONE HCL IN WATER/PF 6 MG/30 ML
0.4 PATIENT CONTROLLED ANALGESIA SYRINGE INTRAVENOUS
Status: COMPLETED | OUTPATIENT
Start: 2023-04-25 | End: 2023-04-25

## 2023-04-25 RX ORDER — FENTANYL CITRATE 50 UG/ML
25 INJECTION, SOLUTION INTRAMUSCULAR; INTRAVENOUS EVERY 5 MIN PRN
Status: DISCONTINUED | OUTPATIENT
Start: 2023-04-25 | End: 2023-04-25 | Stop reason: HOSPADM

## 2023-04-25 RX ORDER — SODIUM CHLORIDE, SODIUM LACTATE, POTASSIUM CHLORIDE, CALCIUM CHLORIDE 600; 310; 30; 20 MG/100ML; MG/100ML; MG/100ML; MG/100ML
INJECTION, SOLUTION INTRAVENOUS CONTINUOUS PRN
Status: DISCONTINUED | OUTPATIENT
Start: 2023-04-25 | End: 2023-04-25

## 2023-04-25 RX ORDER — HYDROMORPHONE HCL IN WATER/PF 6 MG/30 ML
0.2 PATIENT CONTROLLED ANALGESIA SYRINGE INTRAVENOUS EVERY 5 MIN PRN
Status: DISCONTINUED | OUTPATIENT
Start: 2023-04-25 | End: 2023-04-25 | Stop reason: HOSPADM

## 2023-04-25 RX ORDER — ONDANSETRON 2 MG/ML
4 INJECTION INTRAMUSCULAR; INTRAVENOUS EVERY 30 MIN PRN
Status: DISCONTINUED | OUTPATIENT
Start: 2023-04-25 | End: 2023-04-25 | Stop reason: HOSPADM

## 2023-04-25 RX ORDER — OXYCODONE HYDROCHLORIDE 10 MG/1
10 TABLET ORAL EVERY 4 HOURS PRN
Status: DISCONTINUED | OUTPATIENT
Start: 2023-04-25 | End: 2023-05-02

## 2023-04-25 RX ORDER — PROCHLORPERAZINE MALEATE 10 MG
10 TABLET ORAL EVERY 6 HOURS PRN
Status: DISCONTINUED | OUTPATIENT
Start: 2023-04-25 | End: 2023-04-26

## 2023-04-25 RX ORDER — HYDROMORPHONE HCL IN WATER/PF 6 MG/30 ML
0.2 PATIENT CONTROLLED ANALGESIA SYRINGE INTRAVENOUS
Status: COMPLETED | OUTPATIENT
Start: 2023-04-25 | End: 2023-04-25

## 2023-04-25 RX ORDER — KETAMINE HYDROCHLORIDE 50 MG/ML
INJECTION, SOLUTION INTRAMUSCULAR; INTRAVENOUS PRN
Status: DISCONTINUED | OUTPATIENT
Start: 2023-04-25 | End: 2023-04-25

## 2023-04-25 RX ORDER — HYDROMORPHONE HCL IN WATER/PF 6 MG/30 ML
0.4 PATIENT CONTROLLED ANALGESIA SYRINGE INTRAVENOUS EVERY 5 MIN PRN
Status: DISCONTINUED | OUTPATIENT
Start: 2023-04-25 | End: 2023-04-25 | Stop reason: HOSPADM

## 2023-04-25 RX ADMIN — HYDROMORPHONE HYDROCHLORIDE 0.4 MG: 0.2 INJECTION, SOLUTION INTRAMUSCULAR; INTRAVENOUS; SUBCUTANEOUS at 15:54

## 2023-04-25 RX ADMIN — SUGAMMADEX 100 MG: 100 INJECTION, SOLUTION INTRAVENOUS at 14:59

## 2023-04-25 RX ADMIN — HYDROMORPHONE HYDROCHLORIDE 0.4 MG: 0.2 INJECTION, SOLUTION INTRAMUSCULAR; INTRAVENOUS; SUBCUTANEOUS at 18:58

## 2023-04-25 RX ADMIN — HYDROCHLOROTHIAZIDE 25 MG: 25 TABLET ORAL at 07:50

## 2023-04-25 RX ADMIN — Medication 50 MG: at 12:31

## 2023-04-25 RX ADMIN — CEFTRIAXONE SODIUM 2 G: 2 INJECTION, POWDER, FOR SOLUTION INTRAMUSCULAR; INTRAVENOUS at 20:45

## 2023-04-25 RX ADMIN — Medication 5 MG: at 13:23

## 2023-04-25 RX ADMIN — PHENYLEPHRINE HYDROCHLORIDE 200 MCG: 10 INJECTION INTRAVENOUS at 12:30

## 2023-04-25 RX ADMIN — Medication 2 G: at 13:07

## 2023-04-25 RX ADMIN — Medication 99 MG: at 07:50

## 2023-04-25 RX ADMIN — HYDROMORPHONE HYDROCHLORIDE 0.4 MG: 0.2 INJECTION, SOLUTION INTRAMUSCULAR; INTRAVENOUS; SUBCUTANEOUS at 16:14

## 2023-04-25 RX ADMIN — ACETAMINOPHEN 650 MG: 325 TABLET, FILM COATED ORAL at 16:34

## 2023-04-25 RX ADMIN — METOPROLOL SUCCINATE 100 MG: 100 TABLET, EXTENDED RELEASE ORAL at 07:50

## 2023-04-25 RX ADMIN — LISINOPRIL 40 MG: 40 TABLET ORAL at 07:49

## 2023-04-25 RX ADMIN — ONDANSETRON 4 MG: 2 INJECTION INTRAMUSCULAR; INTRAVENOUS at 12:31

## 2023-04-25 RX ADMIN — FENTANYL CITRATE 50 MCG: 50 INJECTION, SOLUTION INTRAMUSCULAR; INTRAVENOUS at 15:32

## 2023-04-25 RX ADMIN — MIDAZOLAM 2 MG: 1 INJECTION INTRAMUSCULAR; INTRAVENOUS at 12:22

## 2023-04-25 RX ADMIN — HYDROMORPHONE HYDROCHLORIDE 0.4 MG: 0.2 INJECTION, SOLUTION INTRAMUSCULAR; INTRAVENOUS; SUBCUTANEOUS at 15:45

## 2023-04-25 RX ADMIN — ACETAMINOPHEN 975 MG: 325 TABLET ORAL at 11:25

## 2023-04-25 RX ADMIN — PROPOFOL 150 MG: 10 INJECTION, EMULSION INTRAVENOUS at 12:30

## 2023-04-25 RX ADMIN — Medication 10 MG: at 13:38

## 2023-04-25 RX ADMIN — HYDROMORPHONE HYDROCHLORIDE 0.4 MG: 0.2 INJECTION, SOLUTION INTRAMUSCULAR; INTRAVENOUS; SUBCUTANEOUS at 22:12

## 2023-04-25 RX ADMIN — FENTANYL CITRATE 50 MCG: 50 INJECTION, SOLUTION INTRAMUSCULAR; INTRAVENOUS at 18:15

## 2023-04-25 RX ADMIN — SODIUM CHLORIDE: 9 INJECTION, SOLUTION INTRAVENOUS at 16:03

## 2023-04-25 RX ADMIN — HYDROMORPHONE HYDROCHLORIDE 0.4 MG: 0.2 INJECTION, SOLUTION INTRAMUSCULAR; INTRAVENOUS; SUBCUTANEOUS at 16:34

## 2023-04-25 RX ADMIN — SODIUM CHLORIDE: 9 INJECTION, SOLUTION INTRAVENOUS at 10:43

## 2023-04-25 RX ADMIN — KETAMINE HYDROCHLORIDE 50 MG: 50 INJECTION, SOLUTION INTRAMUSCULAR; INTRAVENOUS at 12:55

## 2023-04-25 RX ADMIN — FENTANYL CITRATE 50 MCG: 50 INJECTION, SOLUTION INTRAMUSCULAR; INTRAVENOUS at 15:22

## 2023-04-25 RX ADMIN — GABAPENTIN 300 MG: 300 CAPSULE ORAL at 16:36

## 2023-04-25 RX ADMIN — SUGAMMADEX 200 MG: 100 INJECTION, SOLUTION INTRAVENOUS at 14:51

## 2023-04-25 RX ADMIN — Medication 5 MG: at 13:19

## 2023-04-25 RX ADMIN — OXYCODONE HYDROCHLORIDE 10 MG: 10 TABLET ORAL at 16:34

## 2023-04-25 RX ADMIN — OXYCODONE HYDROCHLORIDE 10 MG: 10 TABLET ORAL at 20:44

## 2023-04-25 RX ADMIN — SODIUM CHLORIDE, POTASSIUM CHLORIDE, SODIUM LACTATE AND CALCIUM CHLORIDE: 600; 310; 30; 20 INJECTION, SOLUTION INTRAVENOUS at 12:11

## 2023-04-25 RX ADMIN — FENTANYL CITRATE 50 MCG: 50 INJECTION, SOLUTION INTRAMUSCULAR; INTRAVENOUS at 18:22

## 2023-04-25 RX ADMIN — CITALOPRAM HYDROBROMIDE 20 MG: 20 TABLET ORAL at 07:50

## 2023-04-25 RX ADMIN — FENTANYL CITRATE 100 MCG: 50 INJECTION, SOLUTION INTRAMUSCULAR; INTRAVENOUS at 12:27

## 2023-04-25 RX ADMIN — HYDROMORPHONE HYDROCHLORIDE 0.5 MG: 1 INJECTION, SOLUTION INTRAMUSCULAR; INTRAVENOUS; SUBCUTANEOUS at 14:38

## 2023-04-25 RX ADMIN — LIDOCAINE HYDROCHLORIDE 100 MG: 20 INJECTION, SOLUTION INFILTRATION; PERINEURAL at 12:27

## 2023-04-25 RX ADMIN — SODIUM CHLORIDE: 9 INJECTION, SOLUTION INTRAVENOUS at 07:51

## 2023-04-25 RX ADMIN — SENNOSIDES AND DOCUSATE SODIUM 1 TABLET: 50; 8.6 TABLET ORAL at 20:44

## 2023-04-25 RX ADMIN — HYDROMORPHONE HYDROCHLORIDE 0.4 MG: 0.2 INJECTION, SOLUTION INTRAMUSCULAR; INTRAVENOUS; SUBCUTANEOUS at 16:03

## 2023-04-25 RX ADMIN — CEFTRIAXONE SODIUM 2 G: 2 INJECTION, POWDER, FOR SOLUTION INTRAMUSCULAR; INTRAVENOUS at 04:48

## 2023-04-25 RX ADMIN — Medication 15 MG: at 13:50

## 2023-04-25 RX ADMIN — ACETAMINOPHEN 650 MG: 325 TABLET, FILM COATED ORAL at 20:44

## 2023-04-25 ASSESSMENT — ACTIVITIES OF DAILY LIVING (ADL)
ADLS_ACUITY_SCORE: 18

## 2023-04-25 ASSESSMENT — VISUAL ACUITY
OU: NORMAL ACUITY

## 2023-04-25 ASSESSMENT — LIFESTYLE VARIABLES: TOBACCO_USE: 1

## 2023-04-25 NOTE — ANESTHESIA POSTPROCEDURE EVALUATION
Patient: Edwin Clements    Procedure: Procedure(s):  REMOVAL, SHUNT, VENTRICULOPERITONEAL,  placement of external venticular drain       Anesthesia Type:  General    Note:  Disposition: Admission; ICU   Postop Pain Control: Uneventful            Sign Out: Well controlled pain   PONV: No   Neuro/Psych: Uneventful            Sign Out: Acceptable/Baseline neuro status   Airway/Respiratory: Uneventful            Sign Out: Acceptable/Baseline resp. status   CV/Hemodynamics: Uneventful            Sign Out: Acceptable CV status; No obvious hypovolemia; No obvious fluid overload   Other NRE: NONE   DID A NON-ROUTINE EVENT OCCUR? No           Last vitals:  Vitals Value Taken Time   /82 04/25/23 1700   Temp 36.1  C (97  F) 04/25/23 1511   Pulse 54 04/25/23 1709   Resp 12 04/25/23 1700   SpO2 97 % 04/25/23 1709   Vitals shown include unvalidated device data.    Electronically Signed By: Ian Leonard  April 25, 2023  5:11 PM

## 2023-04-25 NOTE — PLAN OF CARE
[id number]  No changes since last RN note. Externalized shunt drained Q3 hours. Toby wipes completed. Report called to 3C but declined due to pre op assessment at bedside this AM. Family left phone number to be notified at. Will pass along to 4A. All belonging packed up and will be sent to 4A.  Charles

## 2023-04-25 NOTE — ANESTHESIA PROCEDURE NOTES
Airway       Patient location during procedure: OR  Staff -        Anesthesiologist:  Luis Alfredo Guerrero MD       Resident/Fellow: Jacques Caldwell APRN CRNA       Performed By: CRNA  Consent for Airway        Urgency: elective  Indications and Patient Condition       Indications for airway management: christopher-procedural       Induction type:intravenous       Mask difficulty assessment: 1 - vent by mask    Final Airway Details       Final airway type: endotracheal airway       Successful airway: ETT - single and Oral  Endotracheal Airway Details        ETT size (mm): 7.5       Cuffed: yes       Successful intubation technique: direct laryngoscopy       DL Blade Type: Munroe 2       Grade View of Cords: 2       Adjucts: stylet       Position: Right       Measured from: lips       Secured at (cm): 23       Bite block used: None    Post intubation assessment        Placement verified by: capnometry and equal breath sounds        Number of attempts at approach: 1       Number of other approaches attempted: 0       Secured with: silk tape       Ease of procedure: easy       Dentition: Intact and Unchanged (Prior chipped front two teeth.)

## 2023-04-25 NOTE — OP NOTE
Procedure Date: 04/25/2023    PREOPERATIVE DIAGNOSIS:  Infection of ventriculoperitoneal shunt.    POSTOPERATIVE DIAGNOSIS:  Infection of ventriculoperitoneal shunt.    PROCEDURE PERFORMED:  Removal of right ventriculoperitoneal shunt valve medium pressure performance, placement of right frontal external ventricular drain.    PRIMARY SURGEON:  Arnaud Martínez.    ASSISTANT SURGEON:  Dhara Mckee     ANESTHESIA:  General.    ESTIMATED BLOOD LOSS:  10 mL    DRAINS:  Right frontal EVD.    SPECIMEN SENT:  Ventricular catheter explant foreign body for culture.    FINDINGS:  Ventriculoperitoneal shunt removed in its entirety, ventricular catheter soft passed in place, brisk CSF flow.    COMPLICATIONS:  None.    IMPLANTS:  Shunt catheter ventricular antibiotic impregnated Ernst 23 cm, lot number [id number].    INDICATIONS FOR PROCEDURE:  The patient is a 48-year-old male with a history of idiopathic intracranial hypertension status post shunt placement approximately 25 years ago with a last revision in 2015 with revision of the distal catheter.  He presented with progressive headaches, blurry vision and a CT of the abdomen showing a pseudocyst at the distal catheter tip.  Due to the pseudocyst in the abdomen, findings were concerning for shunt failure in the setting of shunt infection.  He underwent bedside externalization of the shunt at the subcostal line and subsequent cultures and CSF demonstrated infection of the CSF with Propionibacterium acnes.  Due to the ongoing positive culture results in the setting of adequate antibiotic coverage, he was indicated to proceed with removal of the entirety of the shunt system with placement of an external ventricular drain.  The risks and benefits were discussed with the patient and he provided consent for the above-mentioned procedure.    DESCRIPTION OF PROCEDURE:  The patient was brought to the operating room by our Anesthesia colleagues.  He underwent general anesthesia with  endotracheal intubation.  The bed was turned 180 degrees and the head was placed on a horseshoe headholder.  The [employer] Stealth neuronavigation system was used to register the patient's anatomy with the plan of ventricular catheter approach at the entry of Kocher point and target at the foramen of Monro.  We planned to register the Stealth neuronavigation system in the case that the catheter could not be soft passed in the prior tract.  Next, the patient's head was shaved with surgical clippers and prepped and draped in the standard surgical fashion including prepping of the neck, chest and abdomen.  Next, a timeout was held confirming the patient's identity, site of procedure, and planned procedure as well as that all pertinent images were available in the room.  A total of 10 mL of lidocaine 1% with epinephrine were infiltrated into the planned incision.  We planned a curvilinear incision in the right frontal region where his prior shunt was placed as well as a planned incision in the right retroauricular region where his shunt valve was palpated.  With this, a 10 blade was used to make an incision in the right frontal incision down to the periosteum.  The Bovie cautery was used to dissect the periosteum and identify the entry point of the ventricular catheter at the don hole.  Once the entirety of the ventricular catheter was identified, we proceeded to open the right retroauricular incision with a 10 blade.  We identified the distal peritoneal catheter as well as its attachments of the ventriculoperitoneal shunt valve by dissecting the tissue with monopolar cautery.  With this, we tied silk ties at the distal peritoneal catheter and cut it.  The peritoneal catheter was then pulled from the externalized portion of the abdomen and removed in its entirety.  Then, we proceeded to tie the ventricular catheter at the right frontal incision and cut it from the shunt valve.  Then, the shunt valve was pulled from the  retroauricular incision in its entirety with the entirety of the tubing.  With this, we turned our attention to the right frontal region and proceeded to remove the ventricular catheter with the clear plastic elbow attachment.  Therefore, the entirety of the existing ventriculoperitoneal shunt system had been removed.  We then soft passed a ventricular catheter in the same entry points to a distance of 7.5 cm as prior measured on the Stealth neuronavigation system.  We identified brisk CSF flow coming from the ventricular catheter.  We then tunneled the ventricular catheter out laterally through the scalp and secured it to the skin with a pursestring 3-0 nylon suture.  We proceeded to copiously irrigate the 2 incisions with gentamicin irrigation and confirmed hemostasis with bipolar cautery.  We proceeded with the closure.  The galea was approximated with inverted interrupted 3-0 Vicryl sutures.  The skin was approximated with a running 3-0 nylon suture.  We placed a figure-of-eight mian stitch, a 3-0 Monocryl at the EVD exit site.  The external ventricular drain was then attached to the Silver drain and secured with silk suture.  We confirmed CSF flow through the Silver drain.  The incisions were cleaned with ChloraPrep solution and bacitracin ointment was applied.  Sterile dressings were applied.    At the end of the procedure, all counts of cautery tips, needles and sponges were correct x2.    Dr. Martínez was present for the critical portions of the procedure and otherwise immediately available for the entirety of the procedure.    Arnaud Martínez MD    As Dictated by JOSÉ CHURCHILL MD        D: 2023   T: 2023   MT: [id number]    Name:     NERY MADRID  MRN:      [id number]        Account:        [id number]   :      1975           Procedure Date: 2023     Document: [id number]

## 2023-04-25 NOTE — PLAN OF CARE
Status: Patient is POD-6 s/p bedside . externalization of the shunt. Admitted for HA with concern for shunt malfuntion. Hx of idiopathic intracranial HTN,  shunt ('92), shunt revision ('15).   Vitals: VSS on RA.   Neuros: Intact   IV:  PICC line in place at [facility].   Labs/Electrolytes: . CSF + for diphtheroids  Resp/trach: LSC bilaterally.   Diet: Regular. NPO at midnight.   Bowel status: LBM 4/24.   : Voiding w/o difficulty.   Skin: Externalized  shunt at [facility], covered with dressing. EVD Q3hrs.   Pain: Denies   Activity: Independent. Kaibab> 30. Pt up in chair this evening.   Plan: Continue monitor.    Updates: Head CT w/o contrast at 0400 am tomorrow. OR scheduled tomorrow for shunt removal. CHG bath done this shift.

## 2023-04-25 NOTE — PROVIDER NOTIFICATION
"\"ARNULFO Clements rm [id number] NSG    Do you want IV fluids while pt is NPO? Thanks David [phone]\"      Paged neurosurgery resident DEV Gastelum  at [phone]    MD called back immediately and wants NS @ 75 mL/hr. Writer will place verbal order.     "

## 2023-04-25 NOTE — ANESTHESIA CARE TRANSFER NOTE
Patient: Edwin Clements    Procedure: Procedure(s):  REMOVAL, SHUNT, VENTRICULOPERITONEAL,  placement of external venticular drain       Diagnosis:  (ventriculoperitoneal) shunt status [Z98.2]  Diagnosis Additional Information: No value filed.    Anesthesia Type:   General     Note:    Oropharynx: oropharynx clear of all foreign objects and spontaneously breathing  Level of Consciousness: drowsy  Oxygen Supplementation: room air    Independent Airway: airway patency satisfactory and stable  Dentition: dentition unchanged  Vital Signs Stable: post-procedure vital signs reviewed and stable  Report to RN Given: handoff report given  Patient transferred to: PACU    Handoff Report: Identifed the Patient, Identified the Reponsible Provider, Reviewed the pertinent medical history, Discussed the surgical course, Reviewed Intra-OP anesthesia mangement and issues during anesthesia, Set expectations for post-procedure period and Allowed opportunity for questions and acknowledgement of understanding      Vitals:  Vitals Value Taken Time   /84 04/25/23 1515   Temp     Pulse 67 04/25/23 1517   Resp     SpO2 100 % 04/25/23 1517   Vitals shown include unvalidated device data.    Electronically Signed By: TEREZA Aiken CRNA  April 25, 2023  3:19 PM

## 2023-04-25 NOTE — PROGRESS NOTES
[facility], [place]   04/25/2023  Neurosurgery Progress Note:    Assessment:  Edwin Clements is a 48-year-old man presenting with symptoms concerning for elevated intracranial pressure (progressive HA, blurry vision), with recent CT abdomen showing pseudocyst surrounding the distal catheter.      Patient is POD-7 s/p bedside externalization of the shunt.    Plan:  - Follow CSF and catheter tip cultures  - On vancomycin and ceftriaxone  - ID following- appreciate recommendations  - OR today for shunt removal and EVD placement  - PICC placed on 4/24/2023  - Monitor for fevers/chills/ severe headaches  - Serial neuro exams  - Pain control  - HOB > 30 degrees  - NPO  - Bowel regimen  - PRN antiemetics  - PT/OT  - SCDs for DVT proph  -----------------------------------  TEREZA Vazquez, CNP  Neurosurgery  Pager [phone]      Interval History: No acute events overnights. Denies headaches.  Stable neurological exam. OR today at 1240 with Dr. Martínez.     Objective:   Temp:  [98  F (36.7  C)-98.2  F (36.8  C)] 98.2  F (36.8  C)  Pulse:  [66-71] 66  Resp:  [14-18] 18  BP: (109-128)/(67-82) 112/78  SpO2:  [97 %-99 %] 97 %  I/O last 3 completed shifts:  In: 300 [P.O.:300]  Out: 431 [Other:431]    Gen: Appears comfortable, NAD  Neurologic:  - Alert & Oriented to person, place, time, and situation  - Follows commands briskly  - Speech fluent, spontaneous. No aphasia or dysarthria.  - No gaze preference. No apparent hemineglect.  - PERRL, EOMI  - Face symmetric with sensation intact to light touch  - Palate elevates symmetrically, uvula midline, tongue protrudes midline  - Trapezii muscles 5/5 bilaterally  - No pronator drift     Del Tr Bi WE WF Gr   R 5 5 5 5 5 5   L 5 5 5 5 5 5    HF KE KF DF PF EHL   R 5 5 5 5 5 5   L 5 5 5 5 5 5     Reflexes 2+ throughout    Sensation intact and symmetric to light touch throughout    LABS:  Recent Labs   Lab 04/25/23  0700 04/24/23  0729 04/23/23  0741    137  135*   POTASSIUM 4.1 4.0 4.0   CHLORIDE 104 102 101   CO2 25 22 22   ANIONGAP 10 13 12   * 93 91   BUN 17.4 17.3 16.7   CR 0.74 0.72 0.70   NIKO 9.4 9.3 9.4       Recent Labs   Lab 04/25/23  0700   WBC 8.4   RBC 4.84   HGB 14.1   HCT 43.7   MCV 90   MCH 29.1   MCHC 32.3   RDW 13.7   *       IMAGING:  Recent Results (from the past 24 hour(s))   XR Chest Port 1 View    Narrative    Exam: XR CHEST PORT 1 VIEW, 4/24/2023 3:04 PM    Comparison: None    History: PICC placement    Findings:  Single AP portable upright view of the chest. Right upper extremity  PICC with tip in the low SVC. Partially visualized  ventriculoperitoneal shunt tubing without evident fracture of the  visualized portion of the tubing.    Trachea is midline. Mediastinum is within normal limits.  Cardiopulmonary silhouette is within normal limits. No acute airspace  disease. There is no pneumothorax or pleural effusion. The upper  abdomen is unremarkable.      Impression    Impression:   1. Right upper extremity PICC with tip in the low SVC.  2. No acute cardiopulmonary findings.    I have personally reviewed the examination and initial interpretation  and I agree with the findings.    ROBERTA SUAREZ,          SYSTEM ID:  [id number]   CT Head w/o Contrast    Narrative    Stealth CT imaging for purposes of stereotactic evaluation    Provided History: planning for OR  ICD-10:  Comparison: Head CT 4/17/2023, 4/5/2023    Technique: CT imaging performed with axial, sagittal, and coronal  reconstructed images obtained without intravenous contrast.    Findings: Limited imaging for stereotactic localization. Right frontal  approach ventriculostomy catheter terminates in the third ventricle.  No overt mass effect, midline shift, or acute intracranial hemorrhage.  The ventricles are unchanged in size. Again noted posterior fossa  arachnoid cyst.  The ventricles are not enlarged, and there is no evidence of  hydrocephalus. The bony calvaria and  bone to the skull base are  normal. The visualized portions of the paranasal sinuses and mastoid  air cells are clear.      Impression    Impression: Stable size of the shunted ventricular system. Limited  imaging performed primarily for the purposes of stereotactic  localization.    I have personally reviewed the examination and initial interpretation  and I agree with the findings.    LAZARO MORENO MD         SYSTEM ID:  [id number]

## 2023-04-25 NOTE — PLAN OF CARE
Status: s/p externalized shunt, admitted for shunt malfunction   Vitals: VSS  Neuros: A/Ox4, draining drip chamber q3 hours  IV: Single lumen PICC TKO w/ IV Abx  Resp/trach: RA  Diet: NPO  Bowel status: LBM 4/24  : Voiding adequately   Skin: no new deficits   Pain: denies   Activity: independent   Plan: OR today at 1240, then to ICU w/ EVD

## 2023-04-26 ENCOUNTER — APPOINTMENT (OUTPATIENT)
Dept: EDUCATION SERVICES | Facility: CLINIC | Age: 48
End: 2023-04-26
Attending: NEUROLOGICAL SURGERY
Payer: COMMERCIAL

## 2023-04-26 ENCOUNTER — APPOINTMENT (OUTPATIENT)
Dept: CT IMAGING | Facility: CLINIC | Age: 48
End: 2023-04-26
Attending: NURSE PRACTITIONER
Payer: COMMERCIAL

## 2023-04-26 LAB
% BASOPHILS, CSF: 4 %
ANION GAP SERPL CALCULATED.3IONS-SCNC: 10 MMOL/L (ref 7–15)
APPEARANCE CSF: CLEAR
BUN SERPL-MCNC: 12.1 MG/DL (ref 6–20)
CALCIUM SERPL-MCNC: 9.1 MG/DL (ref 8.6–10)
CHLORIDE SERPL-SCNC: 101 MMOL/L (ref 98–107)
COLOR CSF: COLORLESS
CREAT SERPL-MCNC: 0.63 MG/DL (ref 0.67–1.17)
DEPRECATED HCO3 PLAS-SCNC: 25 MMOL/L (ref 22–29)
ERYTHROCYTE [DISTWIDTH] IN BLOOD BY AUTOMATED COUNT: 13.8 % (ref 10–15)
GFR SERPL CREATININE-BSD FRML MDRD: >90 ML/MIN/1.73M2
GLUCOSE BLDC GLUCOMTR-MCNC: 92 MG/DL (ref 70–99)
GLUCOSE CSF-MCNC: 67 MG/DL (ref 40–70)
GLUCOSE SERPL-MCNC: 93 MG/DL (ref 70–99)
HCT VFR BLD AUTO: 38.2 % (ref 40–53)
HGB BLD-MCNC: 12.2 G/DL (ref 13.3–17.7)
LYMPH ABN NFR CSF MANUAL: 48 %
MCH RBC QN AUTO: 28.7 PG (ref 26.5–33)
MCHC RBC AUTO-ENTMCNC: 31.9 G/DL (ref 31.5–36.5)
MCV RBC AUTO: 90 FL (ref 78–100)
MONOS+MACROS NFR CSF MANUAL: NORMAL %
NEUTROPHILS NFR CSF MANUAL: 48 %
PLATELET # BLD AUTO: 430 10E3/UL (ref 150–450)
POTASSIUM SERPL-SCNC: 3.9 MMOL/L (ref 3.4–5.3)
PROT CSF-MCNC: <6 MG/DL (ref 15–45)
RBC # BLD AUTO: 4.25 10E6/UL (ref 4.4–5.9)
RBC # CSF MANUAL: 0 /UL (ref 0–2)
SODIUM SERPL-SCNC: 136 MMOL/L (ref 136–145)
TUBE # CSF: ABNORMAL
WBC # BLD AUTO: 7.6 10E3/UL (ref 4–11)
WBC # CSF MANUAL: 15 /UL (ref 0–5)

## 2023-04-26 PROCEDURE — 250N000013 HC RX MED GY IP 250 OP 250 PS 637: Performed by: STUDENT IN AN ORGANIZED HEALTH CARE EDUCATION/TRAINING PROGRAM

## 2023-04-26 PROCEDURE — 87075 CULTR BACTERIA EXCEPT BLOOD: CPT

## 2023-04-26 PROCEDURE — 70450 CT HEAD/BRAIN W/O DYE: CPT

## 2023-04-26 PROCEDURE — 82945 GLUCOSE OTHER FLUID: CPT

## 2023-04-26 PROCEDURE — 87075 CULTR BACTERIA EXCEPT BLOOD: CPT | Performed by: STUDENT IN AN ORGANIZED HEALTH CARE EDUCATION/TRAINING PROGRAM

## 2023-04-26 PROCEDURE — 99232 SBSQ HOSP IP/OBS MODERATE 35: CPT | Performed by: PHYSICIAN ASSISTANT

## 2023-04-26 PROCEDURE — 250N000011 HC RX IP 250 OP 636

## 2023-04-26 PROCEDURE — 250N000011 HC RX IP 250 OP 636: Performed by: STUDENT IN AN ORGANIZED HEALTH CARE EDUCATION/TRAINING PROGRAM

## 2023-04-26 PROCEDURE — 70450 CT HEAD/BRAIN W/O DYE: CPT | Mod: 26 | Performed by: RADIOLOGY

## 2023-04-26 PROCEDURE — 89050 BODY FLUID CELL COUNT: CPT

## 2023-04-26 PROCEDURE — 87070 CULTURE OTHR SPECIMN AEROBIC: CPT

## 2023-04-26 PROCEDURE — 84157 ASSAY OF PROTEIN OTHER: CPT

## 2023-04-26 PROCEDURE — 250N000013 HC RX MED GY IP 250 OP 250 PS 637

## 2023-04-26 PROCEDURE — 80048 BASIC METABOLIC PNL TOTAL CA: CPT | Performed by: STUDENT IN AN ORGANIZED HEALTH CARE EDUCATION/TRAINING PROGRAM

## 2023-04-26 PROCEDURE — 200N000002 HC R&B ICU UMMC

## 2023-04-26 PROCEDURE — 258N000003 HC RX IP 258 OP 636: Performed by: STUDENT IN AN ORGANIZED HEALTH CARE EDUCATION/TRAINING PROGRAM

## 2023-04-26 PROCEDURE — 999N000076 HC STATISTIC ICP MONITORING

## 2023-04-26 PROCEDURE — 85014 HEMATOCRIT: CPT | Performed by: STUDENT IN AN ORGANIZED HEALTH CARE EDUCATION/TRAINING PROGRAM

## 2023-04-26 RX ORDER — PROCHLORPERAZINE MALEATE 10 MG
10 TABLET ORAL EVERY 6 HOURS PRN
Status: DISCONTINUED | OUTPATIENT
Start: 2023-04-26 | End: 2023-05-02

## 2023-04-26 RX ORDER — HYDROMORPHONE HCL IN WATER/PF 6 MG/30 ML
0.2 PATIENT CONTROLLED ANALGESIA SYRINGE INTRAVENOUS ONCE
Status: COMPLETED | OUTPATIENT
Start: 2023-04-26 | End: 2023-04-26

## 2023-04-26 RX ORDER — OXYCODONE HYDROCHLORIDE 5 MG/1
5 TABLET ORAL EVERY 6 HOURS PRN
Status: DISCONTINUED | OUTPATIENT
Start: 2023-04-26 | End: 2023-04-26

## 2023-04-26 RX ADMIN — METOPROLOL SUCCINATE 100 MG: 100 TABLET, EXTENDED RELEASE ORAL at 08:47

## 2023-04-26 RX ADMIN — CEFTRIAXONE SODIUM 2 G: 2 INJECTION, POWDER, FOR SOLUTION INTRAMUSCULAR; INTRAVENOUS at 20:10

## 2023-04-26 RX ADMIN — CITALOPRAM HYDROBROMIDE 20 MG: 20 TABLET ORAL at 08:48

## 2023-04-26 RX ADMIN — PROCHLORPERAZINE MALEATE 10 MG: 10 TABLET ORAL at 18:05

## 2023-04-26 RX ADMIN — Medication 5 ML: at 17:12

## 2023-04-26 RX ADMIN — OXYCODONE HYDROCHLORIDE 10 MG: 10 TABLET ORAL at 10:18

## 2023-04-26 RX ADMIN — ACETAMINOPHEN 650 MG: 325 TABLET, FILM COATED ORAL at 14:16

## 2023-04-26 RX ADMIN — Medication 3 MG: at 20:10

## 2023-04-26 RX ADMIN — OXYCODONE HYDROCHLORIDE 10 MG: 10 TABLET ORAL at 05:44

## 2023-04-26 RX ADMIN — SENNOSIDES AND DOCUSATE SODIUM 1 TABLET: 50; 8.6 TABLET ORAL at 20:10

## 2023-04-26 RX ADMIN — HYDROCHLOROTHIAZIDE 25 MG: 25 TABLET ORAL at 08:47

## 2023-04-26 RX ADMIN — Medication 99 MG: at 08:48

## 2023-04-26 RX ADMIN — CEFTRIAXONE SODIUM 2 G: 2 INJECTION, POWDER, FOR SOLUTION INTRAMUSCULAR; INTRAVENOUS at 08:44

## 2023-04-26 RX ADMIN — SODIUM CHLORIDE: 9 INJECTION, SOLUTION INTRAVENOUS at 05:44

## 2023-04-26 RX ADMIN — ACETAMINOPHEN 650 MG: 325 TABLET, FILM COATED ORAL at 18:48

## 2023-04-26 RX ADMIN — OXYCODONE HYDROCHLORIDE 10 MG: 10 TABLET ORAL at 14:16

## 2023-04-26 RX ADMIN — OXYCODONE HYDROCHLORIDE 10 MG: 10 TABLET ORAL at 18:48

## 2023-04-26 RX ADMIN — HYDROMORPHONE HYDROCHLORIDE 0.2 MG: 0.2 INJECTION, SOLUTION INTRAMUSCULAR; INTRAVENOUS; SUBCUTANEOUS at 08:36

## 2023-04-26 RX ADMIN — LISINOPRIL 40 MG: 40 TABLET ORAL at 08:47

## 2023-04-26 RX ADMIN — OXYCODONE HYDROCHLORIDE 10 MG: 10 TABLET ORAL at 01:28

## 2023-04-26 RX ADMIN — PROCHLORPERAZINE MALEATE 10 MG: 10 TABLET ORAL at 12:32

## 2023-04-26 ASSESSMENT — ACTIVITIES OF DAILY LIVING (ADL)
ADLS_ACUITY_SCORE: 20
ADLS_ACUITY_SCORE: 18
ADLS_ACUITY_SCORE: 18
ADLS_ACUITY_SCORE: 20

## 2023-04-26 ASSESSMENT — VISUAL ACUITY
OU: NORMAL ACUITY

## 2023-04-26 NOTE — PROGRESS NOTES
[facility], [place]   04/26/2023  Neurosurgery Progress Note:    Assessment:  Edwin Clements is a 48-year-old man presenting with symptoms concerning for elevated intracranial pressure (progressive HA, blurry vision), with recent CT abdomen showing pseudocyst surrounding the distal catheter.      Patient is POD-1 s/p removal of shunt and EVD placement; POD-8 s/p bedside externalization of the shunt.    Plan:  - EVD at 20 cm above EAM, open  - Follow CSF and catheter tip cultures  - On vancomycin and ceftriaxone  - ID following- appreciate recommendations  - OR today for shunt removal and EVD placement  - PICC placed on 4/24/2023  - Monitor for fevers/chills/ severe headaches  - Serial neuro exams  - Pain control  - HOB > 30 degrees  - NPO  - Bowel regimen  - PRN antiemetics  - PT/OT  - SCDs for DVT proph  -----------------------------------  Marcin Gastelum  Neurosurgery Resident PGY2    Interval History: No acute events overnights. Stable neurological exam.   Objective:   Temp:  [97  F (36.1  C)-98.2  F (36.8  C)] 97.9  F (36.6  C)  Pulse:  [52-75] 71  Resp:  [10-20] 19  BP: (109-135)/(72-94) 120/83  SpO2:  [94 %-100 %] 98 %  I/O last 3 completed shifts:  In: 2275 [P.O.:200; I.V.:2075]  Out: 873 [Urine:700; Other:173]    Gen: Appears comfortable, NAD  Neurologic:  - Alert & Oriented to person, place, time, and situation  - Follows commands briskly  - Speech fluent, spontaneous. No aphasia or dysarthria.  - No gaze preference. No apparent hemineglect.  - PERRL, EOMI  - Face symmetric with sensation intact to light touch  - Palate elevates symmetrically, uvula midline, tongue protrudes midline  - Trapezii muscles 5/5 bilaterally  - No pronator drift     Del Tr Bi WE WF Gr   R 5 5 5 5 5 5   L 5 5 5 5 5 5    HF KE KF DF PF EHL   R 5 5 5 5 5 5   L 5 5 5 5 5 5     Reflexes 2+ throughout    Sensation intact and symmetric to light touch throughout    LABS:  Recent Labs   Lab 04/26/23  6127  04/26/23  0327 04/25/23  1856 04/25/23  0700 04/24/23  0729   NA  --  136  --  139 137   POTASSIUM  --  3.9  --  4.1 4.0   CHLORIDE  --  101  --  104 102   CO2  --  25  --  25 22   ANIONGAP  --  10  --  10 13   GLC 92 93 89 104* 93   BUN  --  12.1  --  17.4 17.3   CR  --  0.63*  --  0.74 0.72   NIKO  --  9.1  --  9.4 9.3       Recent Labs   Lab 04/26/23 0327   WBC 7.6   RBC 4.25*   HGB 12.2*   HCT 38.2*   MCV 90   MCH 28.7   MCHC 31.9   RDW 13.8          IMAGING:  Recent Results (from the past 24 hour(s))   CT Head w/o Contrast    Narrative    EXAM: CT HEAD W/O CONTRAST  4/25/2023 6:43 PM     HISTORY:  Postop EVD       COMPARISON:  4/25/2023    TECHNIQUE: Using multidetector thin collimation helical acquisition  technique, axial, coronal and sagittal CT images from the skull base  to the vertex were obtained without intravenous contrast.   (topogram) image(s) also obtained and reviewed.    FINDINGS:    Right frontal approach ventriculostomy catheter with tip near the  foramen of [place]. Small amount of post procedural subcutaneous  emphysema. Stable configuration of the shunted ventricular system.    No acute intracranial hemorrhage, mass effect, or midline shift. No  acute loss of gray-white matter differentiation in the cerebral  hemispheres. Ventricles are proportionate to the cerebral sulci. Clear  basal cisterns.    The bony calvaria and the bones of the skull base are normal. The  visualized portions of the paranasal sinuses and mastoid air cells are  clear. Grossly normal orbits.       Impression    IMPRESSION: New external ventricular drain in place without  hydrocephalus. No acute intracranial abnormality.    I have personally reviewed the examination and initial interpretation  and I agree with the findings.    LAZARO MORENO MD         SYSTEM ID:  [id number]

## 2023-04-26 NOTE — CONSULTS
Met with patient and his wife Carmella for PICC and IV med education. Demonstrated on a model flushing, cap change, use of the emergency clamp, and administration of IV medication using the IV push method. We also discussed line safety and when to call with concerns. Carmella plans to help with the IV medication at home. She was able to return demonstrate medication administration on the model without difficulty. Both Edwin and Carmella were engaged in learning and verbalized understanding of the information given.     Literature given: Handwashing and Skin Care, Caring for Your PICC at Home, Changing the End Cap, Flushing the Line with Heparin, Saline or Citrate, Instructions for IV Push Medicine.

## 2023-04-26 NOTE — PROGRESS NOTES
Care Management Follow Up    Length of Stay (days): 9    Expected Discharge Date: TBD       Patient plan of care discussed at interdisciplinary rounds: Yes    Anticipated Discharge Disposition: TBD    Anticipated Discharge Services: TBD  Anticipated Discharge DME: TBD    Additional Information:  Pt had removal of shunt and EVD placement done yesterday, 4/25 and transferred to ICU.  Per chart review, referral for home IV abx was send by unit RN[facility], incase pt needs to be discharge home with IV abx.   Pt has coverage for home IV abx, see RN[facility] note on 4/24.  [facility] will cont to follow the plan of care and assist with any care coordination assistance need.     Esther Anna RN, PHN, BSN  4A and 4E/ ICU  Care Coordinator  Phone: [phone]  Pager: [phone]    To contact the weekend RN[facility]  [place] ([phone]) Saturday and Sunday   Units: 4A, 4C, 4E, 5A and 5B- Pager [phone]   Units: 6A, 6B- Pager [phone]   Unit : 6C, 6D- pager [phone]   Units: 7A, 7B, 7C, 7D, and 5C- Pager [phone]

## 2023-04-26 NOTE — PLAN OF CARE
ICU End of Shift Summary. See flowsheets for vital signs and detailed assessment.    Changes this shift: Patient Aox4,  able to make needs known and able to shift weight in bed independently. Pain from incision managed with PRN medication.  Pt HR ranging 50's-70's,  BP within goal with systolic <140.  EVD and neuro checks completed Q1. Patient O2 sustained on RA. Voiding with bedside urinal independently.      Plan: Continue with plan of care.       Goal Outcome Evaluation:      Plan of Care Reviewed With: patient          Outcome Evaluation: Patient sleeping  between care.

## 2023-04-26 NOTE — PROGRESS NOTES
CLINICAL NUTRITION SERVICES    Reviewed nutrition risk factors due to LOS. Pt is tolerating diet, eating well per nursing documentation. No nutrition issues identified at this time. RD will follow per policy at this time, unless consulted.     Wt Readings from Last 9 Encounters:   04/05/23 93.8 kg (206 lb 12.7 oz)   03/26/15 95.4 kg (210 lb 5.1 oz)   03/25/15 95.3 kg (210 lb)       Becca Guerrier RD, LD, [facility]  Neuro ICU  Pager: [phone]

## 2023-04-26 NOTE — PROGRESS NOTES
Patient has no history of ELENI, and sating high 90's on room air. Order discontinued, RN notified.

## 2023-04-26 NOTE — PLAN OF CARE
Goal Outcome Evaluation:      Plan of Care Reviewed With: patient    Overall Patient Progress: improvingOverall Patient Progress: improving    Outcome Evaluation: See below    Major Shift Events:  Pressure/discomfort behind eyes improved with compazine and lowering EVD to 15 above EAM. Neuro assessment WDL. VSS. On room air. Tolerating PO intake. Voiding spontaneously.     Plan: Continue w/ POC.    For vital signs and complete assessments, please see documentation flowsheets.

## 2023-04-26 NOTE — PROGRESS NOTES
"  [facility]EAS[facility] PROGRESS NOTE     Patient:  Edwin Clements   Date of birth 1975, Medical record number [id number]  Date of Visit:  04/26/2023  Date of Admission: 4/17/2023  Consult Requester:Arnaud Martínez, *          Assessment and Plan:   ID Problem List   1. Cutibacterium acnes  shunt infection               -CSF cultures 4/17 positive. Subsequent sampling abnormal through 4/24 thus far   -S/p  shunt removal with EVD placement 4/25  2. Abdominal loculated fluid collection c/f abscess associated with distal  shunt catheter  2. Headaches and blurry vision c/f shunt malfunction  3.  shunt for idiopathic intracranial HTN      RECOMMENDATION:  1.  shunt system removed by NSG 4/25. Will hopefully plan for a 7 day  shunt free period as long as cultures clear of C acnes prior to replacement. ID will follow and recommend safe date for replacement.   2. Please continue daily sampling of CSF to follow for culture clearance.   3. Continue IV Ceftriaxone 2g q12hrs (CNS coverage).      ASSESSMENT:  Edwin Clements is a 48 year old male with PMHx significant for idiopathic intracranial HTN s/p  shunt (1992) who presents to the ED with headache and pressure c/f shunt malfunction.      CT head w/o e/o abnormalities. CT CAP at OSH 3/21/23 with \"16.9 cm loculated fluid collection in left mid abdomen associated with tip of  shunt catheter..\" Repeat CT AP 4/5 with decreased size of loculated fluid collection around 14.6cm. Repeat CT AP 4/19 with further decrease in abdominal fluid collection to 10.4 cm. IR evaluated as to whether abdominal fluid collection could be sampled and unfortunately there was no safe window of accesss for sampling or drain placement. Will need serial CT scans to monitor for resolution.     CSF sampled from  shunt continues to be positive via gram stain for GPBs quickly after collection which is concerning. Cell count appears to be downtrending. Cultures " "from CSF 4/17, 19-21 positive for C acnes. 4/22-24 cultures pending. S/p entire  shunt removal by NSG 4/25 and EVD placement. We will follow IDSA guidelines regarding planning on replacement of  shunt- with C acnes, it is recommended to have a 7 day period after shunt removal with culture clearance prior to replacement. If cultures remain positive, we would need to extend that window.      Recommendations discussed with primary team. ID will continue to follow.     35 MINUTES SPENT BY ME on the date of service doing chart review, history, exam, documentation & further activities per the note.      Leah Laughlin PA-C  Infectious Diseases  Pager #[phone]          Interim History and Events:   Struggling with significant headache this morning after EVD placement yesterday. States \"it feels like my head is going to explode\". Denied other new symptoms. Discussed antibiotic treatment and plan for EVD replacement based on current information that we have.          HPI:   Edwin Clements is a 48 year old male with PMHx significant for idiopathic intracranial HTN s/p  shunt (1992) who presents to the ED with headache and pressure c/f shunt malfunction.      [place] [facility] [place] prior for findings of pseudocyst in abdomen surrounding distal end of  catheter. Initial plans were for revision of shunt later this year, however he presented to the ED 4/17/23 with headaches for several days at home with associated pressure behind eyes and blurry vision- which are similar symptoms to when shunt malfunctioned in the past. Of note, prior to discovery of pseudocyst, patient did have complaints of abdominal pain which he felt like went back as far as last year. He was having a bout of this abdominal pain (localized to RLQ) prior to his trip to [place] at the beginninig of March. It worsened while in [place] with distension of his abdomen and decreased appetite. Has since improved. Denies fever, chills, " "sweats, n/v/d, or URI symptoms. No rashes or lesions on skin.      Lives in [place], [place]. 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 Denied consuming raw meat. No recent travel outside of trip to [place]- stayed at all inclusive resort and did not leave the resort or consume local foods.            ROS:   -Focused 5 point ROS completed, pertinent positives and negatives listed above.    Physical Examination:  Temp: 99.1  F (37.3  C) Temp src: Oral BP: 115/77 Pulse: 73   Resp: 12 SpO2: 98 % O2 Device: None (Room air)      There were no vitals filed for this visit.    Constitutional: Pleasant male seen sitting up in recliner chair, in NAD. Alert and interactive.   HEENT: NCAT, anicteric sclerae, conjunctiva clear. Moist mucous membranes.  Respiratory: Non-labored breathing on RA.   GI: Abdomen is soft, non-distended.  Skin: Warm and dry. No rashes.  Musculoskeletal: Extremities grossly normal.  Neurologic: A &O x3, speech normal, answering questions appropriately. Moves all extremities spontaneously. Grossly non-focal.  Neuropsychiatric: Mentation and affect normal/appropriate.  VAD: PICC c/d/i.       Medications:    cefTRIAXone  2 g Intravenous Q12H     citalopram  20 mg Oral Daily     heparin lock flush  5-20 mL Intracatheter Q24H     hydrochlorothiazide  25 mg Oral Daily     lisinopril  40 mg Oral Daily     metoprolol succinate ER  100 mg Oral Daily     polyethylene glycol  17 g Oral Daily     Potassium Gluconate  99 mg Oral Daily     senna-docusate  1 tablet Oral BID     sodium chloride (PF)  3 mL Intracatheter Q8H       Infusions/Drips:      Laboratory Data:   No results found for: ACD4    Inflammatory Markers    No lab results found.    Metabolic Studies       Recent Labs   Lab Test 04/26/23  0330 04/26/23  0327 04/25/23  1856 04/25/23  0700 04/24/23  0729 04/23/23  0741 04/22/23  0813 04/21/23  0559   NA  --  136  --  139 137 135* " 135* 130*   POTASSIUM  --  3.9  --  4.1 4.0 4.0 4.1 4.0   CHLORIDE  --  101  --  104 102 101 101 96*   CO2  --  25  --  25 22 22 22 23   ANIONGAP  --  10  --  10 13 12 12 11   BUN  --  12.1  --  17.4 17.3 16.7 17.0 16.1   CR  --  0.63*  --  0.74 0.72 0.70 0.64* 0.81   GFRESTIMATED  --  >90  --  >90 >90 >90 >90 >90   GLC 92 93 89 104* 93 91 91 103*   NIKO  --  9.1  --  9.4 9.3 9.4 9.6 9.8       Hepatic Studies    Recent Labs   Lab Test 04/17/23  1543   BILITOTAL 0.4   ALKPHOS 90   ALBUMIN 4.2   AST 18   ALT 9*       Pancreatitis testing    No lab results found.    Hematology Studies      Recent Labs   Lab Test 04/26/23  0327 04/25/23  0700 04/24/23  0729 04/23/23  0741 04/22/23  0813 04/21/23  0559   WBC 7.6 8.4 8.1 8.3 7.1 7.9   HGB 12.2* 14.1 13.6 14.1 14.9 14.3   HCT 38.2* 43.7 42.8 43.7 45.8 44.8    473* 425 416 384 391     CSF testing     Recent Labs   Lab Test 04/26/23  1106 04/24/23  1002 04/24/23  1001 04/23/23  1017 04/22/23  1150 04/22/23  1148 04/21/23  0921 04/20/23  1150 04/20/23  0953 04/19/23  0934 04/17/23  1831   CWBC  --  76*  --  21* 36*  --   --  206* 123* 223* 4   CNEU  --   --   --   --  2  --   --  3 2 6  --    CLYM  --  75  --  61 84  --   --  78 85 79  --    CMONO  --  25  --  35 14  --   --  19 13 15  --    COTH  --   --   --  3  --   --   --   --   --   --   --    CRBC  --  0  --  0 5*  --   --  0 4* 4* 1   CGLU 67  --  61 58  --  72* 68 63 69 67 57   CTP <6.0*  --  27.5 24.2  --  25.7 23.1 27.5 21.7 24.1 7.7*         Microbiology:  Culture   Date Value Ref Range Status   04/25/2023 No growth, less than 1 day  Preliminary   04/24/2023 No growth after 2 days  Preliminary   04/24/2023 No growth after 2 days  Preliminary   04/24/2023 No anaerobic organisms isolated after 2 days  Preliminary   04/24/2023 No growth after 1 day  Preliminary   04/23/2023 No growth after 2 days  Preliminary   04/23/2023 No growth after 2 days  Preliminary   04/23/2023 No anaerobic organisms isolated after 3  days  Preliminary   04/23/2023 No growth after 2 days  Preliminary   04/22/2023 No anaerobic organisms isolated after 3 days  Preliminary   04/22/2023 No growth after 3 days  Preliminary   04/21/2023 1+ Cutibacterium (Propionibacterium) acnes (AA)  Preliminary     Comment:     Susceptibilities done on previous cultures   04/21/2023 No growth after 4 days  Preliminary   04/20/2023 No growth after 5 days  Preliminary   04/20/2023 1+ Cutibacterium (Propionibacterium) acnes (AA)  Preliminary     Comment:     Susceptibilities done on previous cultures   04/20/2023 Culture in progress  Preliminary   04/20/2023 1+ Cutibacterium (Propionibacterium) acnes (AA)  Preliminary     Comment:     Susceptibilities done on previous cultures   04/20/2023 No growth after 5 days  Preliminary   04/19/2023 Culture in progress  Preliminary   04/19/2023 1+ Cutibacterium (Propionibacterium) acnes (AA)  Preliminary     Comment:     Susceptibilities done on previous cultures   04/19/2023 (AA)  Final    Isolated in broth only Gram positive bacilli, resembling diphtheroids     Comment:     On day 2 of incubationSee anaerobic report for identification   04/18/2023 No Growth  Final   04/17/2023 (AA)  Final    Isolated in broth only Gram positive bacilli, resembling diphtheroids     Comment:     On day 2 of incubationSee anaerobic report for identification   04/17/2023 1+ Cutibacterium (Propionibacterium) acnes (AA)  Preliminary       Last check of C difficile  No results found for: CDBPCT    IMAGING  CT head w/o contrast (4/25/23)  IMPRESSION: New external ventricular drain in place without  hydrocephalus. No acute intracranial abnormality.    CT AP w/o contrast (4/19/23)  IMPRESSION:   1. Externalized ventriculoperitoneal shunt with decreased size of the  central abdominal fluid collection measuring 10.4 cm in greatest  dimension (previously 15.6 cm). No free air.  2. Stable remaining exam.     CT head (4/17/23)   Impression:  1. Stable position  of right ventriculostomy catheter.  2. Stable size of the ventricular system.  3. No acute intracranial findings.     CT AP w/o contrast (4/5/23)  IMPRESSION:   1. Decreased size of the loculated fluid collection in the central  abdomen about the ventriculoperitoneal shunt tip measuring 14.6 cm in  greatest dimension (previously 19.9 cm).   2. Stable 9 mm hemorrhagic/proteinaceous renal cyst in the right lower  pole.  3. Additional incidental/chronic findings as noted above.     OSH imaging:   CT AP w/ contrast (3/21/23)  IMPRESSION:   1.  Moderately large 16.9 cm loculated fluid collection in the left mid abdomen associated with the tip of the ventriculoperitoneal shunt catheter which results in mass effect on small bowel and could be a source of intermittent partial small bowel obstruction with a caliber change at this level without additional significant bowel dilatation is suggested high-grade obstruction. An infected fluid collection would be less likely given the lack inflammatory changes marginating the loculated fluid collection.     2.  8 mm exophytic right renal lesion anteriorly which may be a small hyperdense renal cyst or possibly small solid renal mass. Further evaluation with renal ultrasound is suggested.

## 2023-04-26 NOTE — PROGRESS NOTES
Admitted/transferred from: PACU  Reason for admission/transfer: Post op monitoring following surgery.  2 RN skin assessment: completed by Betzy SALDIVAR And Mateusz FERNANDO  Result of skin assessment and interventions/actions: Expected surgical incisions otherwise skin intact.   Height, weight, drug calc weight: Done  Patient belongings (see Flowsheet)  MDRO education added to care plan Yes    Pt arrived to unit 4 at approx 1845. Reports of incisional pain/headache otherwise neuro assessment WDL. On room air. Verbalizing needs appropriately.   ?

## 2023-04-27 LAB
ANION GAP SERPL CALCULATED.3IONS-SCNC: 11 MMOL/L (ref 7–15)
BACTERIA CSF CULT: NO GROWTH
BACTERIA CSF CULT: NO GROWTH
BACTERIA SPEC CULT: NO GROWTH
BUN SERPL-MCNC: 15.2 MG/DL (ref 6–20)
CALCIUM SERPL-MCNC: 9.1 MG/DL (ref 8.6–10)
CHLORIDE SERPL-SCNC: 98 MMOL/L (ref 98–107)
CREAT SERPL-MCNC: 0.73 MG/DL (ref 0.67–1.17)
CRP SERPL-MCNC: 56 MG/L
DEPRECATED HCO3 PLAS-SCNC: 28 MMOL/L (ref 22–29)
ERYTHROCYTE [DISTWIDTH] IN BLOOD BY AUTOMATED COUNT: 13.5 % (ref 10–15)
GFR SERPL CREATININE-BSD FRML MDRD: >90 ML/MIN/1.73M2
GLUCOSE SERPL-MCNC: 100 MG/DL (ref 70–99)
GRAM STAIN RESULT: ABNORMAL
HCT VFR BLD AUTO: 40.2 % (ref 40–53)
HGB BLD-MCNC: 13.1 G/DL (ref 13.3–17.7)
MAGNESIUM SERPL-MCNC: 2 MG/DL (ref 1.7–2.3)
MCH RBC QN AUTO: 29.1 PG (ref 26.5–33)
MCHC RBC AUTO-ENTMCNC: 32.6 G/DL (ref 31.5–36.5)
MCV RBC AUTO: 89 FL (ref 78–100)
PHOSPHATE SERPL-MCNC: 4.4 MG/DL (ref 2.5–4.5)
PLATELET # BLD AUTO: 498 10E3/UL (ref 150–450)
POTASSIUM SERPL-SCNC: 3.8 MMOL/L (ref 3.4–5.3)
RBC # BLD AUTO: 4.5 10E6/UL (ref 4.4–5.9)
SODIUM SERPL-SCNC: 137 MMOL/L (ref 136–145)
WBC # BLD AUTO: 6.5 10E3/UL (ref 4–11)

## 2023-04-27 PROCEDURE — 250N000013 HC RX MED GY IP 250 OP 250 PS 637: Performed by: STUDENT IN AN ORGANIZED HEALTH CARE EDUCATION/TRAINING PROGRAM

## 2023-04-27 PROCEDURE — 250N000013 HC RX MED GY IP 250 OP 250 PS 637: Performed by: NEUROLOGICAL SURGERY

## 2023-04-27 PROCEDURE — 250N000013 HC RX MED GY IP 250 OP 250 PS 637

## 2023-04-27 PROCEDURE — 999N000076 HC STATISTIC ICP MONITORING

## 2023-04-27 PROCEDURE — 80048 BASIC METABOLIC PNL TOTAL CA: CPT | Performed by: STUDENT IN AN ORGANIZED HEALTH CARE EDUCATION/TRAINING PROGRAM

## 2023-04-27 PROCEDURE — 200N000002 HC R&B ICU UMMC

## 2023-04-27 PROCEDURE — 83735 ASSAY OF MAGNESIUM: CPT

## 2023-04-27 PROCEDURE — 250N000011 HC RX IP 250 OP 636: Performed by: STUDENT IN AN ORGANIZED HEALTH CARE EDUCATION/TRAINING PROGRAM

## 2023-04-27 PROCEDURE — 86140 C-REACTIVE PROTEIN: CPT | Performed by: STUDENT IN AN ORGANIZED HEALTH CARE EDUCATION/TRAINING PROGRAM

## 2023-04-27 PROCEDURE — 85027 COMPLETE CBC AUTOMATED: CPT | Performed by: STUDENT IN AN ORGANIZED HEALTH CARE EDUCATION/TRAINING PROGRAM

## 2023-04-27 PROCEDURE — 84100 ASSAY OF PHOSPHORUS: CPT

## 2023-04-27 RX ORDER — MAGNESIUM OXIDE 400 MG/1
400 TABLET ORAL EVERY 4 HOURS
Status: COMPLETED | OUTPATIENT
Start: 2023-04-27 | End: 2023-04-27

## 2023-04-27 RX ORDER — POTASSIUM CHLORIDE 750 MG/1
20 TABLET, EXTENDED RELEASE ORAL ONCE
Status: COMPLETED | OUTPATIENT
Start: 2023-04-27 | End: 2023-04-27

## 2023-04-27 RX ADMIN — Medication 400 MG: at 06:06

## 2023-04-27 RX ADMIN — HYDROCHLOROTHIAZIDE 25 MG: 25 TABLET ORAL at 08:23

## 2023-04-27 RX ADMIN — OXYCODONE HYDROCHLORIDE 10 MG: 10 TABLET ORAL at 20:33

## 2023-04-27 RX ADMIN — CITALOPRAM HYDROBROMIDE 20 MG: 20 TABLET ORAL at 08:24

## 2023-04-27 RX ADMIN — CEFTRIAXONE SODIUM 2 G: 2 INJECTION, POWDER, FOR SOLUTION INTRAMUSCULAR; INTRAVENOUS at 20:34

## 2023-04-27 RX ADMIN — POLYETHYLENE GLYCOL 3350 17 G: 17 POWDER, FOR SOLUTION ORAL at 08:23

## 2023-04-27 RX ADMIN — POTASSIUM CHLORIDE 20 MEQ: 750 TABLET, EXTENDED RELEASE ORAL at 06:06

## 2023-04-27 RX ADMIN — SENNOSIDES AND DOCUSATE SODIUM 1 TABLET: 50; 8.6 TABLET ORAL at 20:33

## 2023-04-27 RX ADMIN — PROCHLORPERAZINE MALEATE 10 MG: 10 TABLET ORAL at 08:23

## 2023-04-27 RX ADMIN — ACETAMINOPHEN 650 MG: 325 TABLET, FILM COATED ORAL at 08:40

## 2023-04-27 RX ADMIN — ACETAMINOPHEN 650 MG: 325 TABLET, FILM COATED ORAL at 20:33

## 2023-04-27 RX ADMIN — LISINOPRIL 40 MG: 40 TABLET ORAL at 08:23

## 2023-04-27 RX ADMIN — Medication 400 MG: at 09:04

## 2023-04-27 RX ADMIN — ACETAMINOPHEN 650 MG: 325 TABLET, FILM COATED ORAL at 03:32

## 2023-04-27 RX ADMIN — OXYCODONE HYDROCHLORIDE 10 MG: 10 TABLET ORAL at 03:32

## 2023-04-27 RX ADMIN — ACETAMINOPHEN 650 MG: 325 TABLET, FILM COATED ORAL at 14:48

## 2023-04-27 RX ADMIN — Medication 3 MG: at 20:33

## 2023-04-27 RX ADMIN — Medication 99 MG: at 08:28

## 2023-04-27 RX ADMIN — OXYCODONE HYDROCHLORIDE 10 MG: 10 TABLET ORAL at 14:47

## 2023-04-27 RX ADMIN — SENNOSIDES AND DOCUSATE SODIUM 1 TABLET: 50; 8.6 TABLET ORAL at 08:23

## 2023-04-27 RX ADMIN — CEFTRIAXONE SODIUM 2 G: 2 INJECTION, POWDER, FOR SOLUTION INTRAMUSCULAR; INTRAVENOUS at 08:23

## 2023-04-27 RX ADMIN — OXYCODONE HYDROCHLORIDE 10 MG: 10 TABLET ORAL at 08:40

## 2023-04-27 RX ADMIN — METOPROLOL SUCCINATE 100 MG: 100 TABLET, EXTENDED RELEASE ORAL at 08:23

## 2023-04-27 ASSESSMENT — ACTIVITIES OF DAILY LIVING (ADL)
ADLS_ACUITY_SCORE: 20
ADLS_ACUITY_SCORE: 18
ADLS_ACUITY_SCORE: 20

## 2023-04-27 NOTE — PLAN OF CARE
Major Shift Events:  EVD 15 above EAM.  ICP 1-11 and OP 0- 13. Neuro assessment WDL. Oxy and tylenol for pain. VSS. On room air. Voiding spontaneously.      Plan: Continue POC.     For vital signs and complete assessments, please see documentation flowsheets.

## 2023-04-27 NOTE — PLAN OF CARE
Goal Outcome Evaluation:  Progressing    D/I: C/O some incisional pain (head). Pt states pressure like. Medicated with PRN oxycodone and Tylenol with some relief.  ICP 2-8 (0-8cc/hr)  A: Pt currently up in the chair and awaiting for his dinner.   P: Continue to monitor pain/ICP/VS... see flow sheet for details.

## 2023-04-27 NOTE — PROGRESS NOTES
[facility], [place]   04/27/2023  Neurosurgery Progress Note:    Assessment:  Edwin Clements is a 48-year-old man presenting with symptoms concerning for elevated intracranial pressure (progressive HA, blurry vision), with recent CT abdomen showing pseudocyst surrounding the distal catheter.      Patient is POD-2 s/p removal of shunt and EVD placement; POD-9 s/p bedside externalization of the shunt.    Plan:  - EVD at 15 cm above EAM, open  - Follow CSF and catheter tip cultures  - On vancomycin and ceftriaxone  - ID following- appreciate recommendations  - OR today for shunt removal and EVD placement  - PICC placed on 4/24/2023  - Monitor for fevers/chills/ severe headaches  - Serial neuro exams  - Pain control  - HOB > 30 degrees  - NPO  - Bowel regimen  - PRN antiemetics  - PT/OT  - SCDs for DVT proph  -----------------------------------  Marcin Gastelum  Neurosurgery Resident PGY2    Interval History: No acute events overnights. Stable neurological exam.   Objective:   Temp:  [98  F (36.7  C)-99.7  F (37.6  C)] 99  F (37.2  C)  Pulse:  [53-93] 57  Resp:  [11-18] 18  BP: ()/(64-89) 104/75  SpO2:  [91 %-98 %] 94 %  I/O last 3 completed shifts:  In: 325 [I.V.:325]  Out: 2934 [Urine:2775; Other:159]    Gen: Appears comfortable, NAD  Neurologic:  - Alert & Oriented to person, place, time, and situation  - Follows commands briskly  - Speech fluent, spontaneous. No aphasia or dysarthria.  - No gaze preference. No apparent hemineglect.  - PERRL, EOMI  - Face symmetric with sensation intact to light touch  - Palate elevates symmetrically, uvula midline, tongue protrudes midline  - Trapezii muscles 5/5 bilaterally  - No pronator drift     Del Tr Bi WE WF Gr   R 5 5 5 5 5 5   L 5 5 5 5 5 5    HF KE KF DF PF EHL   R 5 5 5 5 5 5   L 5 5 5 5 5 5     Reflexes 2+ throughout    Sensation intact and symmetric to light touch throughout    LABS:  Recent Labs   Lab 04/27/23  0337 04/26/23  0330  04/26/23  0327 04/25/23  1856 04/25/23  0700     --  136  --  139   POTASSIUM 3.8  --  3.9  --  4.1   CHLORIDE 98  --  101  --  104   CO2 28  --  25  --  25   ANIONGAP 11  --  10  --  10   * 92 93   < > 104*   BUN 15.2  --  12.1  --  17.4   CR 0.73  --  0.63*  --  0.74   NIKO 9.1  --  9.1  --  9.4    < > = values in this interval not displayed.       Recent Labs   Lab 04/27/23  0337   WBC 6.5   RBC 4.50   HGB 13.1*   HCT 40.2   MCV 89   MCH 29.1   MCHC 32.6   RDW 13.5   *       IMAGING:  Recent Results (from the past 24 hour(s))   CT Head w/o Contrast    Narrative    EXAM: CT HEAD W/O CONTRAST  4/26/2023 12:02 PM     HISTORY: headaches with recent EVD placement; Headache; Acute HA (< 3  months), no complicating features       COMPARISON: CT 4/25/2023    TECHNIQUE: Using multidetector thin collimation helical acquisition  technique, axial, coronal and sagittal CT images from the skull base  to the vertex were obtained without intravenous contrast.   (topogram) image(s) also obtained and reviewed.    FINDINGS:  Right frontal approach ventriculostomy catheter tip is positioned  within the right lateral ventricle, tip overlying the intraventricular  foramina. Stable size, shape of shunted ventricular system. No acute  intracranial hemorrhage, mass effect, or midline shift. No acute loss  of gray-white matter differentiation in the cerebral hemispheres.  Clear basal cisterns. Stable posterior fossa arachnoid cyst.    Recent treatment changes including subcutaneous air in the right scalp  from ventriculostomy catheter placement. The visualized portions of  the paranasal sinuses and mastoid air cells are clear. Grossly normal  orbits.       Impression    IMPRESSION: Stable shunted ventricular system. No acute intracranial  pathology.     I have personally reviewed the examination and initial interpretation  and I agree with the findings.    HIRAL JAMESON MD         SYSTEM ID:  [id number]

## 2023-04-28 LAB
ANION GAP SERPL CALCULATED.3IONS-SCNC: 12 MMOL/L (ref 7–15)
APPEARANCE CSF: CLEAR
BACTERIA BLD CULT: NO GROWTH
BACTERIA BLD CULT: NO GROWTH
BACTERIA CSF CULT: NO GROWTH
BUN SERPL-MCNC: 14.1 MG/DL (ref 6–20)
CALCIUM SERPL-MCNC: 9.7 MG/DL (ref 8.6–10)
CHLORIDE SERPL-SCNC: 99 MMOL/L (ref 98–107)
COLOR CSF: COLORLESS
CREAT SERPL-MCNC: 0.68 MG/DL (ref 0.67–1.17)
DEPRECATED HCO3 PLAS-SCNC: 25 MMOL/L (ref 22–29)
ERYTHROCYTE [DISTWIDTH] IN BLOOD BY AUTOMATED COUNT: 13.6 % (ref 10–15)
GFR SERPL CREATININE-BSD FRML MDRD: >90 ML/MIN/1.73M2
GLUCOSE CSF-MCNC: 68 MG/DL (ref 40–70)
GLUCOSE SERPL-MCNC: 94 MG/DL (ref 70–99)
GRAM STAIN RESULT: ABNORMAL
GRAM STAIN RESULT: ABNORMAL
HCT VFR BLD AUTO: 41.4 % (ref 40–53)
HGB BLD-MCNC: 13.5 G/DL (ref 13.3–17.7)
LYMPH ABN NFR CSF MANUAL: 84 %
MAGNESIUM SERPL-MCNC: 1.9 MG/DL (ref 1.7–2.3)
MCH RBC QN AUTO: 29.1 PG (ref 26.5–33)
MCHC RBC AUTO-ENTMCNC: 32.6 G/DL (ref 31.5–36.5)
MCV RBC AUTO: 89 FL (ref 78–100)
MONOS+MACROS NFR CSF MANUAL: 7 %
NEUTROPHILS NFR CSF MANUAL: 9 %
PHOSPHATE SERPL-MCNC: 4.2 MG/DL (ref 2.5–4.5)
PLATELET # BLD AUTO: 511 10E3/UL (ref 150–450)
POTASSIUM SERPL-SCNC: 4.1 MMOL/L (ref 3.4–5.3)
PROT CSF-MCNC: 9.2 MG/DL (ref 15–45)
RBC # BLD AUTO: 4.64 10E6/UL (ref 4.4–5.9)
RBC # CSF MANUAL: 1 /UL (ref 0–2)
SODIUM SERPL-SCNC: 136 MMOL/L (ref 136–145)
TUBE # CSF: ABNORMAL
WBC # BLD AUTO: 5.7 10E3/UL (ref 4–11)
WBC # CSF MANUAL: 14 /UL (ref 0–5)

## 2023-04-28 PROCEDURE — 84132 ASSAY OF SERUM POTASSIUM: CPT | Performed by: STUDENT IN AN ORGANIZED HEALTH CARE EDUCATION/TRAINING PROGRAM

## 2023-04-28 PROCEDURE — 87205 SMEAR GRAM STAIN: CPT

## 2023-04-28 PROCEDURE — 250N000013 HC RX MED GY IP 250 OP 250 PS 637: Performed by: STUDENT IN AN ORGANIZED HEALTH CARE EDUCATION/TRAINING PROGRAM

## 2023-04-28 PROCEDURE — 84100 ASSAY OF PHOSPHORUS: CPT

## 2023-04-28 PROCEDURE — 250N000013 HC RX MED GY IP 250 OP 250 PS 637: Performed by: NEUROLOGICAL SURGERY

## 2023-04-28 PROCEDURE — 87075 CULTR BACTERIA EXCEPT BLOOD: CPT

## 2023-04-28 PROCEDURE — 83735 ASSAY OF MAGNESIUM: CPT

## 2023-04-28 PROCEDURE — 82945 GLUCOSE OTHER FLUID: CPT

## 2023-04-28 PROCEDURE — 89051 BODY FLUID CELL COUNT: CPT

## 2023-04-28 PROCEDURE — 99232 SBSQ HOSP IP/OBS MODERATE 35: CPT | Mod: 24 | Performed by: STUDENT IN AN ORGANIZED HEALTH CARE EDUCATION/TRAINING PROGRAM

## 2023-04-28 PROCEDURE — 84157 ASSAY OF PROTEIN OTHER: CPT

## 2023-04-28 PROCEDURE — 200N000002 HC R&B ICU UMMC

## 2023-04-28 PROCEDURE — 85027 COMPLETE CBC AUTOMATED: CPT | Performed by: STUDENT IN AN ORGANIZED HEALTH CARE EDUCATION/TRAINING PROGRAM

## 2023-04-28 PROCEDURE — 87015 SPECIMEN INFECT AGNT CONCNTJ: CPT

## 2023-04-28 PROCEDURE — 250N000011 HC RX IP 250 OP 636: Performed by: STUDENT IN AN ORGANIZED HEALTH CARE EDUCATION/TRAINING PROGRAM

## 2023-04-28 RX ORDER — MAGNESIUM OXIDE 400 MG/1
400 TABLET ORAL EVERY 4 HOURS
Status: COMPLETED | OUTPATIENT
Start: 2023-04-28 | End: 2023-04-28

## 2023-04-28 RX ADMIN — OXYCODONE HYDROCHLORIDE 10 MG: 10 TABLET ORAL at 15:45

## 2023-04-28 RX ADMIN — CITALOPRAM HYDROBROMIDE 20 MG: 20 TABLET ORAL at 08:35

## 2023-04-28 RX ADMIN — SENNOSIDES AND DOCUSATE SODIUM 1 TABLET: 50; 8.6 TABLET ORAL at 20:11

## 2023-04-28 RX ADMIN — ACETAMINOPHEN 650 MG: 325 TABLET, FILM COATED ORAL at 08:38

## 2023-04-28 RX ADMIN — OXYCODONE HYDROCHLORIDE 10 MG: 10 TABLET ORAL at 01:27

## 2023-04-28 RX ADMIN — CEFTRIAXONE SODIUM 2 G: 2 INJECTION, POWDER, FOR SOLUTION INTRAMUSCULAR; INTRAVENOUS at 08:35

## 2023-04-28 RX ADMIN — Medication 400 MG: at 08:35

## 2023-04-28 RX ADMIN — METOPROLOL SUCCINATE 100 MG: 100 TABLET, EXTENDED RELEASE ORAL at 08:35

## 2023-04-28 RX ADMIN — Medication 3 MG: at 21:04

## 2023-04-28 RX ADMIN — ACETAMINOPHEN 650 MG: 325 TABLET, FILM COATED ORAL at 21:04

## 2023-04-28 RX ADMIN — HYDROCHLOROTHIAZIDE 25 MG: 25 TABLET ORAL at 08:35

## 2023-04-28 RX ADMIN — LISINOPRIL 40 MG: 40 TABLET ORAL at 08:35

## 2023-04-28 RX ADMIN — Medication 400 MG: at 12:38

## 2023-04-28 RX ADMIN — SENNOSIDES AND DOCUSATE SODIUM 1 TABLET: 50; 8.6 TABLET ORAL at 08:35

## 2023-04-28 RX ADMIN — OXYCODONE HYDROCHLORIDE 10 MG: 10 TABLET ORAL at 08:39

## 2023-04-28 RX ADMIN — OXYCODONE HYDROCHLORIDE 10 MG: 10 TABLET ORAL at 21:04

## 2023-04-28 RX ADMIN — CEFTRIAXONE SODIUM 2 G: 2 INJECTION, POWDER, FOR SOLUTION INTRAMUSCULAR; INTRAVENOUS at 20:11

## 2023-04-28 RX ADMIN — POLYETHYLENE GLYCOL 3350 17 G: 17 POWDER, FOR SOLUTION ORAL at 08:35

## 2023-04-28 RX ADMIN — ACETAMINOPHEN 650 MG: 325 TABLET, FILM COATED ORAL at 01:27

## 2023-04-28 RX ADMIN — Medication 99 MG: at 08:53

## 2023-04-28 ASSESSMENT — ACTIVITIES OF DAILY LIVING (ADL)
ADLS_ACUITY_SCORE: 18

## 2023-04-28 NOTE — PROGRESS NOTES
"[facility]  General Infectious Disease Progress Note     Patient:  Edwin Clements, Date of birth 1975, Medical record number [id number]  Date of Visit:  April 28, 2023         Assessment and Recommendations:   Problem List:  1. Cutibacterium acnes  shunt infection               -CSF cultures 4/17 positive. Subsequent sampling abnormal through 4/24 thus far               -S/p  shunt removal with EVD placement 4/25  2. Abdominal loculated fluid collection c/f abscess associated with distal  shunt catheter  2. Headaches and blurry vision c/f shunt malfunction  3.  shunt for idiopathic intracranial HTN     Impression: Edwin Clements is a 48 year old male with PMHx significant for idiopathic intracranial HTN s/p  shunt (1992) who presents to the ED with headache and pressure c/f shunt malfunction.      CT head w/o e/o abnormalities. CT CAP at OSH 3/21/23 with \"16.9 cm loculated fluid collection in left mid abdomen associated with tip of  shunt catheter..\" Repeat CT AP 4/5 with decreased size of loculated fluid collection around 14.6cm. Repeat CT AP 4/19 with further decrease in abdominal fluid collection to 10.4 cm. IR evaluated as to whether abdominal fluid collection could be sampled and unfortunately there was no safe window of accesss for sampling or drain placement. Will need serial CT scans to monitor for resolution.      CSF sampled from  shunt continues to be positive via gram stain for GPBs quickly after collection which is concerning. Cell count appears to be downtrending. Cultures from CSF 4/17, 19-24 positive for C acnes. S/p entire  shunt removal by NSG 4/25 and EVD placement. We will follow IDSA guidelines regarding planning on replacement of  shunt- with C acnes, it is recommended to have a 7 day period after shunt removal with culture clearance prior to replacement. CSF culture on 4/26 no growth to date. 4/28 CSF culture pending. Cell counts are " normalizing.     If cultures remain negative on 4/26 and 4/28, would plan to replace shunt on 5/2.     Recommendations:  1. Continue to sample CSF to follow for culture clearance.   2.   Continue IV Ceftriaxone 2g q12hrs (CNS coverage).   3.   If cultures remain negative, tentative plan to replace shunt on 5/2 (or any day thereafter).     ID will follow.         Attestation:  I have reviewed today's vital signs, medications, labs and imaging.  Tyesha Watt MD  Pager [phone]          Interval History:       Patient feels well today, he has no complaints.          Review of Systems:   CONSTITUTIONAL:  No fevers or chills  EYES: negative for icterus  ENT:  negative for oral lesions, hearing loss, tinnitus and sore throat  RESPIRATORY:  negative for cough and dyspnea  CARDIOVASCULAR:  negative for chest pain, palpitations  GASTROINTESTINAL:  negative for nausea, vomiting, diarrhea and constipation  GENITOURINARY:  negative for dysuria  HEME:  No easy bruising/bleeding  INTEGUMENT:  negative for rash and pruritus  NEURO:  Negative for headache         Current Antimicrobials     Ceftriaxone 2g IV q12h d1: 4/19/23       Physical Exam:   Ranges for vital signs:  Temp:  [98  F (36.7  C)-98.6  F (37  C)] 98.1  F (36.7  C)  Pulse:  [45-66] 57  Resp:  [16-22] 20  BP: (101-125)/(69-95) 125/91  SpO2:  [94 %-100 %] 98 %      Exam:  GENERAL:  well-developed, well-nourished, in no acute distress.   ENT:  Head is normocephalic, atraumatic. Oropharynx is moist without exudates or ulcers.  EYES:  Eyes have anicteric sclerae. PERRL.   NECK:  Supple.   SKIN:  No acute rashes.            Laboratory Data:     Creatinine   Date Value Ref Range Status   04/28/2023 0.68 0.67 - 1.17 mg/dL Final   04/27/2023 0.73 0.67 - 1.17 mg/dL Final   04/26/2023 0.63 (L) 0.67 - 1.17 mg/dL Final   04/25/2023 0.74 0.67 - 1.17 mg/dL Final   04/24/2023 0.72 0.67 - 1.17 mg/dL Final   03/26/2015 0.83 0.66 - 1.25 mg/dL Final     WBC   Date Value Ref  Range Status   03/26/2015 7.6 4.0 - 11.0 10e9/L Final     WBC Count   Date Value Ref Range Status   04/28/2023 5.7 4.0 - 11.0 10e3/uL Final   04/27/2023 6.5 4.0 - 11.0 10e3/uL Final   04/26/2023 7.6 4.0 - 11.0 10e3/uL Final   04/25/2023 8.4 4.0 - 11.0 10e3/uL Final   04/24/2023 8.1 4.0 - 11.0 10e3/uL Final     Hemoglobin   Date Value Ref Range Status   04/28/2023 13.5 13.3 - 17.7 g/dL Final   03/26/2015 16.3 13.3 - 17.7 g/dL Final     Platelet Count   Date Value Ref Range Status   04/28/2023 511 (H) 150 - 450 10e3/uL Final   03/26/2015 308 150 - 450 10e9/L Final     AST   Date Value Ref Range Status   04/17/2023 18 10 - 50 U/L Final     ALT   Date Value Ref Range Status   04/17/2023 9 (L) 10 - 50 U/L Final     Bilirubin Total   Date Value Ref Range Status   04/17/2023 0.4 <=1.2 mg/dL Final     Lab Results   Component Value Date     04/28/2023    BUN 14.1 04/28/2023    CO2 25 04/28/2023       Culture data:  4/28 CSF culture pending  4/26 CSF culture NGTD  4/24 Blood cultures NGTD  4/24 CSF culture: P. acnes

## 2023-04-28 NOTE — PROGRESS NOTES
[facility], [place]   04/28/2023  Neurosurgery Progress Note:    Assessment:  Edwin Clements is a 48-year-old man presenting with symptoms concerning for elevated intracranial pressure (progressive HA, blurry vision), with recent CT abdomen showing pseudocyst surrounding the distal catheter.      Patient is POD-3 s/p removal of shunt and EVD placement; POD-10 s/p bedside externalization of the shunt.    Plan:  - EVD at 15 cm above EAM, open  - Follow CSF and catheter tip cultures  - On vancomycin and ceftriaxone  - ID following- appreciate recommendations  - OR today for shunt removal and EVD placement  - PICC placed on 4/24/2023  - Monitor for fevers/chills/ severe headaches  - Serial neuro exams  - Pain control  - HOB > 30 degrees  - NPO  - Bowel regimen  - PRN antiemetics  - PT/OT  - SCDs for DVT proph  - OR scheduled for 5/2/2023  -----------------------------------  Marcin Gastelum  Neurosurgery Resident PGY2    Interval History: No acute events overnights. Stable neurological exam.     Objective:   Temp:  [97.9  F (36.6  C)-98.6  F (37  C)] 98.1  F (36.7  C)  Pulse:  [45-66] 58  Resp:  [16-22] 20  BP: (101-122)/(69-91) 119/91  SpO2:  [94 %-100 %] 98 %  I/O last 3 completed shifts:  In: 700 [P.O.:600; I.V.:100]  Out: 1827 [Urine:1750; Other:77]    Gen: Appears comfortable, NAD  Neurologic:  - Alert & Oriented to person, place, time, and situation  - Follows commands briskly  - Speech fluent, spontaneous. No aphasia or dysarthria.  - No gaze preference. No apparent hemineglect.  - PERRL, EOMI  - Face symmetric with sensation intact to light touch  - Palate elevates symmetrically, uvula midline, tongue protrudes midline  - Trapezii muscles 5/5 bilaterally  - No pronator drift     Del Tr Bi WE WF Gr   R 5 5 5 5 5 5   L 5 5 5 5 5 5    HF KE KF DF PF EHL   R 5 5 5 5 5 5   L 5 5 5 5 5 5     Reflexes 2+ throughout    Sensation intact and symmetric to light touch throughout    LABS:  Recent  Labs   Lab 04/28/23  0539 04/27/23  0337 04/26/23  0330 04/26/23  0327    137  --  136   POTASSIUM 4.1 3.8  --  3.9   CHLORIDE 99 98  --  101   CO2 25 28  --  25   ANIONGAP 12 11  --  10   GLC 94 100* 92 93   BUN 14.1 15.2  --  12.1   CR 0.68 0.73  --  0.63*   NIKO 9.7 9.1  --  9.1       Recent Labs   Lab 04/28/23  0539   WBC 5.7   RBC 4.64   HGB 13.5   HCT 41.4   MCV 89   MCH 29.1   MCHC 32.6   RDW 13.6   *       IMAGING:  No results found for this or any previous visit (from the past 24 hour(s)).

## 2023-04-29 LAB
ANION GAP SERPL CALCULATED.3IONS-SCNC: 12 MMOL/L (ref 7–15)
BACTERIA BLD CULT: NO GROWTH
BACTERIA BLD CULT: NO GROWTH
BACTERIA CSF CULT: NO GROWTH
BUN SERPL-MCNC: 18.4 MG/DL (ref 6–20)
CALCIUM SERPL-MCNC: 9.6 MG/DL (ref 8.6–10)
CHLORIDE SERPL-SCNC: 101 MMOL/L (ref 98–107)
CREAT SERPL-MCNC: 0.75 MG/DL (ref 0.67–1.17)
CRP SERPL-MCNC: 16.9 MG/L
DEPRECATED HCO3 PLAS-SCNC: 25 MMOL/L (ref 22–29)
ERYTHROCYTE [DISTWIDTH] IN BLOOD BY AUTOMATED COUNT: 13.5 % (ref 10–15)
ERYTHROCYTE [SEDIMENTATION RATE] IN BLOOD BY WESTERGREN METHOD: 22 MM/HR (ref 0–15)
GFR SERPL CREATININE-BSD FRML MDRD: >90 ML/MIN/1.73M2
GLUCOSE SERPL-MCNC: 96 MG/DL (ref 70–99)
GRAM STAIN RESULT: ABNORMAL
GRAM STAIN RESULT: ABNORMAL
HCT VFR BLD AUTO: 41 % (ref 40–53)
HGB BLD-MCNC: 13.3 G/DL (ref 13.3–17.7)
MAGNESIUM SERPL-MCNC: 2 MG/DL (ref 1.7–2.3)
MCH RBC QN AUTO: 28.7 PG (ref 26.5–33)
MCHC RBC AUTO-ENTMCNC: 32.4 G/DL (ref 31.5–36.5)
MCV RBC AUTO: 89 FL (ref 78–100)
PHOSPHATE SERPL-MCNC: 4.7 MG/DL (ref 2.5–4.5)
PLATELET # BLD AUTO: 514 10E3/UL (ref 150–450)
POTASSIUM SERPL-SCNC: 4 MMOL/L (ref 3.4–5.3)
RBC # BLD AUTO: 4.63 10E6/UL (ref 4.4–5.9)
SODIUM SERPL-SCNC: 138 MMOL/L (ref 136–145)
WBC # BLD AUTO: 5.2 10E3/UL (ref 4–11)

## 2023-04-29 PROCEDURE — 83735 ASSAY OF MAGNESIUM: CPT

## 2023-04-29 PROCEDURE — 85652 RBC SED RATE AUTOMATED: CPT | Performed by: NEUROLOGICAL SURGERY

## 2023-04-29 PROCEDURE — 200N000002 HC R&B ICU UMMC

## 2023-04-29 PROCEDURE — 250N000011 HC RX IP 250 OP 636: Performed by: NEUROLOGICAL SURGERY

## 2023-04-29 PROCEDURE — 84100 ASSAY OF PHOSPHORUS: CPT

## 2023-04-29 PROCEDURE — 250N000011 HC RX IP 250 OP 636: Performed by: STUDENT IN AN ORGANIZED HEALTH CARE EDUCATION/TRAINING PROGRAM

## 2023-04-29 PROCEDURE — 80048 BASIC METABOLIC PNL TOTAL CA: CPT | Performed by: NEUROLOGICAL SURGERY

## 2023-04-29 PROCEDURE — 86140 C-REACTIVE PROTEIN: CPT | Performed by: NEUROLOGICAL SURGERY

## 2023-04-29 PROCEDURE — 250N000013 HC RX MED GY IP 250 OP 250 PS 637: Performed by: STUDENT IN AN ORGANIZED HEALTH CARE EDUCATION/TRAINING PROGRAM

## 2023-04-29 PROCEDURE — 85027 COMPLETE CBC AUTOMATED: CPT | Performed by: NEUROLOGICAL SURGERY

## 2023-04-29 RX ORDER — MAGNESIUM SULFATE HEPTAHYDRATE 40 MG/ML
2 INJECTION, SOLUTION INTRAVENOUS ONCE
Status: COMPLETED | OUTPATIENT
Start: 2023-04-29 | End: 2023-04-29

## 2023-04-29 RX ADMIN — LISINOPRIL 40 MG: 40 TABLET ORAL at 09:15

## 2023-04-29 RX ADMIN — Medication 99 MG: at 09:17

## 2023-04-29 RX ADMIN — CEFTRIAXONE SODIUM 2 G: 2 INJECTION, POWDER, FOR SOLUTION INTRAMUSCULAR; INTRAVENOUS at 20:00

## 2023-04-29 RX ADMIN — POLYETHYLENE GLYCOL 3350 17 G: 17 POWDER, FOR SOLUTION ORAL at 09:14

## 2023-04-29 RX ADMIN — HYDROCHLOROTHIAZIDE 25 MG: 25 TABLET ORAL at 09:14

## 2023-04-29 RX ADMIN — METOPROLOL SUCCINATE 100 MG: 100 TABLET, EXTENDED RELEASE ORAL at 09:15

## 2023-04-29 RX ADMIN — ROPINIROLE HYDROCHLORIDE 0.25 MG: 0.25 TABLET, FILM COATED ORAL at 21:20

## 2023-04-29 RX ADMIN — Medication 3 MG: at 21:20

## 2023-04-29 RX ADMIN — CITALOPRAM HYDROBROMIDE 20 MG: 20 TABLET ORAL at 09:15

## 2023-04-29 RX ADMIN — Medication 5 ML: at 18:11

## 2023-04-29 RX ADMIN — CEFTRIAXONE SODIUM 2 G: 2 INJECTION, POWDER, FOR SOLUTION INTRAMUSCULAR; INTRAVENOUS at 09:17

## 2023-04-29 RX ADMIN — MAGNESIUM SULFATE IN WATER 2 G: 40 INJECTION, SOLUTION INTRAVENOUS at 06:11

## 2023-04-29 RX ADMIN — OXYCODONE HYDROCHLORIDE 10 MG: 10 TABLET ORAL at 04:13

## 2023-04-29 RX ADMIN — ACETAMINOPHEN 650 MG: 325 TABLET, FILM COATED ORAL at 04:13

## 2023-04-29 RX ADMIN — SENNOSIDES 8.6 MG: 8.6 TABLET, FILM COATED ORAL at 13:31

## 2023-04-29 RX ADMIN — SENNOSIDES AND DOCUSATE SODIUM 1 TABLET: 50; 8.6 TABLET ORAL at 09:14

## 2023-04-29 RX ADMIN — SENNOSIDES AND DOCUSATE SODIUM 1 TABLET: 50; 8.6 TABLET ORAL at 20:00

## 2023-04-29 ASSESSMENT — ACTIVITIES OF DAILY LIVING (ADL)
ADLS_ACUITY_SCORE: 18

## 2023-04-29 ASSESSMENT — VISUAL ACUITY: OU: NORMAL ACUITY

## 2023-04-29 NOTE — PLAN OF CARE
Goal Outcome Evaluation:  Major Shift Events:  Headache controlled with current regimen. Slept between cares. No change in neuro assessment. EVD patent. Room air. BP controlled.   Plan: IVAB. Surgery next week.   For vital signs and complete assessments, please see documentation flowsheets.

## 2023-04-29 NOTE — PROGRESS NOTES
[facility], [place], [place]   04/29/2023  Neurosurgery Progress Note:    Assessment:  Edwin Clements is a 48-year-old man presenting with symptoms concerning for elevated intracranial pressure (progressive HA, blurry vision), with recent CT abdomen showing pseudocyst surrounding the distal catheter.      Patient is POD-4 s/p removal of shunt and EVD placement; POD-11 s/p bedside externalization of the shunt.    Plan:  - EVD at 15 cm above EAM, open  - Follow CSF and catheter tip cultures  - On vancomycin and ceftriaxone  - ID following- appreciate recommendations  - OR today for shunt removal and EVD placement  - PICC placed on 4/24/2023  - Monitor for fevers/chills/ severe headaches  - Serial neuro exams  - Pain control  - HOB > 30 degrees  - NPO  - Bowel regimen  - PRN antiemetics  - PT/OT  - SCDs for DVT proph  - OR scheduled for 5/2/2023  -----------------------------------  Marcin Gastelum  Neurosurgery Resident PGY2    Interval History: No acute events overnights. Stable neurological exam.     Objective:   Temp:  [97.7  F (36.5  C)-98.2  F (36.8  C)] 97.7  F (36.5  C)  Pulse:  [45-76] 50  Resp:  [10-20] 10  BP: (102-125)/(62-95) 104/74  SpO2:  [96 %-98 %] 97 %  I/O last 3 completed shifts:  In: 970 [P.O.:700; I.V.:270]  Out: 2031 [Urine:1950; Other:81]    Gen: Appears comfortable, NAD  Neurologic:  - Alert & Oriented to person, place, time, and situation  - Follows commands briskly  - Speech fluent, spontaneous. No aphasia or dysarthria.  - No gaze preference. No apparent hemineglect.  - PERRL, EOMI  - Face symmetric with sensation intact to light touch  - Palate elevates symmetrically, uvula midline, tongue protrudes midline  - Trapezii muscles 5/5 bilaterally  - No pronator drift     Del Tr Bi WE WF Gr   R 5 5 5 5 5 5   L 5 5 5 5 5 5    HF KE KF DF PF EHL   R 5 5 5 5 5 5   L 5 5 5 5 5 5     Reflexes 2+ throughout    Sensation intact and symmetric to light touch throughout    LABS:  Recent  Labs   Lab 04/28/23  0539 04/27/23  0337 04/26/23  0330 04/26/23  0327    137  --  136   POTASSIUM 4.1 3.8  --  3.9   CHLORIDE 99 98  --  101   CO2 25 28  --  25   ANIONGAP 12 11  --  10   GLC 94 100* 92 93   BUN 14.1 15.2  --  12.1   CR 0.68 0.73  --  0.63*   NIKO 9.7 9.1  --  9.1       Recent Labs   Lab 04/29/23  0417   WBC 5.2   RBC 4.63   HGB 13.3   HCT 41.0   MCV 89   MCH 28.7   MCHC 32.4   RDW 13.5   *       IMAGING:  No results found for this or any previous visit (from the past 24 hour(s)).

## 2023-04-29 NOTE — PLAN OF CARE
Goal Outcome Evaluation:         D/I: ICP 2-7 (0-5cc/hr)  Pt had minimal c/o H/A today. PRN oxycodone for pain relief used. Pt ambulated in the halls x 2.   A: SB/SR.. BP wnl  P: Plan for surgery on Tuesday per MD. Pt and family is aware.

## 2023-04-29 NOTE — PLAN OF CARE
[id number]    ICU End of Shift Summary. See flowsheets for vital signs and detailed assessment.    Changes this shift: A&O x4. Follows commands appropriately, rates headache(described as incisional pain where EVD is) 2/10. No PRNs adm this shift. ICPs 3-9 with outputs between 0-10. Up in room multiple times. Small BM today. Voids adequately via urinal.     Plan: R. Pupil currently dilated @5-6mm, pt was dilated for research study done earlier in the afternoon. Will continue with POC and update team with changes.

## 2023-04-29 NOTE — PROGRESS NOTES
Neurocritical Care Multi-Disciplinary Note    Reason for critical care admission: s/p removal of shunt and  placement   Primary Team: neurosurgery   Date of Service:  04/29/2023  Date of Admission:  4/17/2023  Hospital Day: 13    Patient condition reviewed and discussed while on multidisciplinary rounds today. Please note these minor interventions that were initiated:  1. None    The Neurocritical Care service will continue to follow peripherally while the patient is within the ICU. We are readily available should issues arise. Please feel free to contact us for critical care issues with which we may be of assistance. For all other concerns, please contact primary service first.     Please contact NCC team phone at [phone]    TEREZA Horvath, CNP  Neurocritical Care  *[phone]  Text Page

## 2023-04-30 LAB
ANION GAP SERPL CALCULATED.3IONS-SCNC: 12 MMOL/L (ref 7–15)
APPEARANCE CSF: CLEAR
BACTERIA CSF CULT: NO GROWTH
BUN SERPL-MCNC: 24.8 MG/DL (ref 6–20)
CALCIUM SERPL-MCNC: 9.4 MG/DL (ref 8.6–10)
CHLORIDE SERPL-SCNC: 105 MMOL/L (ref 98–107)
COLOR CSF: COLORLESS
CREAT SERPL-MCNC: 0.7 MG/DL (ref 0.67–1.17)
DEPRECATED HCO3 PLAS-SCNC: 23 MMOL/L (ref 22–29)
ERYTHROCYTE [DISTWIDTH] IN BLOOD BY AUTOMATED COUNT: 13.4 % (ref 10–15)
GFR SERPL CREATININE-BSD FRML MDRD: >90 ML/MIN/1.73M2
GLUCOSE CSF-MCNC: 74 MG/DL (ref 40–70)
GLUCOSE SERPL-MCNC: 104 MG/DL (ref 70–99)
GRAM STAIN RESULT: ABNORMAL
GRAM STAIN RESULT: ABNORMAL
HCT VFR BLD AUTO: 40 % (ref 40–53)
HGB BLD-MCNC: 12.9 G/DL (ref 13.3–17.7)
LYMPH ABN NFR CSF MANUAL: 84 %
MAGNESIUM SERPL-MCNC: 2 MG/DL (ref 1.7–2.3)
MCH RBC QN AUTO: 28.7 PG (ref 26.5–33)
MCHC RBC AUTO-ENTMCNC: 32.3 G/DL (ref 31.5–36.5)
MCV RBC AUTO: 89 FL (ref 78–100)
MONOS+MACROS NFR CSF MANUAL: 16 %
NEUTROPHILS NFR CSF MANUAL: NORMAL %
PHOSPHATE SERPL-MCNC: 4 MG/DL (ref 2.5–4.5)
PLATELET # BLD AUTO: 500 10E3/UL (ref 150–450)
POTASSIUM SERPL-SCNC: 3.8 MMOL/L (ref 3.4–5.3)
PROT CSF-MCNC: 6.6 MG/DL (ref 15–45)
RBC # BLD AUTO: 4.49 10E6/UL (ref 4.4–5.9)
RBC # CSF MANUAL: 4 /UL (ref 0–2)
SODIUM SERPL-SCNC: 140 MMOL/L (ref 136–145)
TUBE # CSF: ABNORMAL
WBC # BLD AUTO: 5.7 10E3/UL (ref 4–11)
WBC # CSF MANUAL: 23 /UL (ref 0–5)

## 2023-04-30 PROCEDURE — 84100 ASSAY OF PHOSPHORUS: CPT

## 2023-04-30 PROCEDURE — 250N000011 HC RX IP 250 OP 636: Performed by: STUDENT IN AN ORGANIZED HEALTH CARE EDUCATION/TRAINING PROGRAM

## 2023-04-30 PROCEDURE — 87205 SMEAR GRAM STAIN: CPT

## 2023-04-30 PROCEDURE — 999N000076 HC STATISTIC ICP MONITORING

## 2023-04-30 PROCEDURE — 250N000011 HC RX IP 250 OP 636

## 2023-04-30 PROCEDURE — 85027 COMPLETE CBC AUTOMATED: CPT | Performed by: NEUROLOGICAL SURGERY

## 2023-04-30 PROCEDURE — 80048 BASIC METABOLIC PNL TOTAL CA: CPT | Performed by: NEUROLOGICAL SURGERY

## 2023-04-30 PROCEDURE — 200N000002 HC R&B ICU UMMC

## 2023-04-30 PROCEDURE — 89051 BODY FLUID CELL COUNT: CPT

## 2023-04-30 PROCEDURE — 84157 ASSAY OF PROTEIN OTHER: CPT

## 2023-04-30 PROCEDURE — 83735 ASSAY OF MAGNESIUM: CPT

## 2023-04-30 PROCEDURE — 82945 GLUCOSE OTHER FLUID: CPT

## 2023-04-30 PROCEDURE — 250N000013 HC RX MED GY IP 250 OP 250 PS 637: Performed by: STUDENT IN AN ORGANIZED HEALTH CARE EDUCATION/TRAINING PROGRAM

## 2023-04-30 PROCEDURE — 87070 CULTURE OTHR SPECIMN AEROBIC: CPT

## 2023-04-30 PROCEDURE — 87075 CULTR BACTERIA EXCEPT BLOOD: CPT

## 2023-04-30 RX ORDER — HEPARIN SODIUM 5000 [USP'U]/.5ML
5000 INJECTION, SOLUTION INTRAVENOUS; SUBCUTANEOUS EVERY 8 HOURS
Status: DISCONTINUED | OUTPATIENT
Start: 2023-04-30 | End: 2023-04-30

## 2023-04-30 RX ORDER — CARBOXYMETHYLCELLULOSE SODIUM 5 MG/ML
1 SOLUTION/ DROPS OPHTHALMIC
Status: DISCONTINUED | OUTPATIENT
Start: 2023-04-30 | End: 2023-05-02

## 2023-04-30 RX ORDER — HEPARIN SODIUM 5000 [USP'U]/.5ML
5000 INJECTION, SOLUTION INTRAVENOUS; SUBCUTANEOUS EVERY 8 HOURS
Status: DISCONTINUED | OUTPATIENT
Start: 2023-04-30 | End: 2023-05-01

## 2023-04-30 RX ADMIN — Medication 1 DROP: at 22:31

## 2023-04-30 RX ADMIN — ROPINIROLE HYDROCHLORIDE 0.25 MG: 0.25 TABLET, FILM COATED ORAL at 20:42

## 2023-04-30 RX ADMIN — HEPARIN SODIUM 5000 UNITS: 5000 INJECTION, SOLUTION INTRAVENOUS; SUBCUTANEOUS at 08:54

## 2023-04-30 RX ADMIN — METOPROLOL SUCCINATE 100 MG: 100 TABLET, EXTENDED RELEASE ORAL at 08:54

## 2023-04-30 RX ADMIN — ACETAMINOPHEN 650 MG: 325 TABLET, FILM COATED ORAL at 22:31

## 2023-04-30 RX ADMIN — CEFTRIAXONE SODIUM 2 G: 2 INJECTION, POWDER, FOR SOLUTION INTRAMUSCULAR; INTRAVENOUS at 19:51

## 2023-04-30 RX ADMIN — ACETAMINOPHEN 650 MG: 325 TABLET, FILM COATED ORAL at 09:11

## 2023-04-30 RX ADMIN — POLYETHYLENE GLYCOL 3350 17 G: 17 POWDER, FOR SOLUTION ORAL at 08:54

## 2023-04-30 RX ADMIN — CEFTRIAXONE SODIUM 2 G: 2 INJECTION, POWDER, FOR SOLUTION INTRAMUSCULAR; INTRAVENOUS at 08:54

## 2023-04-30 RX ADMIN — CITALOPRAM HYDROBROMIDE 20 MG: 20 TABLET ORAL at 08:54

## 2023-04-30 RX ADMIN — ROPINIROLE HYDROCHLORIDE 0.25 MG: 0.25 TABLET, FILM COATED ORAL at 06:14

## 2023-04-30 RX ADMIN — OXYCODONE HYDROCHLORIDE 10 MG: 10 TABLET ORAL at 22:31

## 2023-04-30 RX ADMIN — HEPARIN SODIUM 5000 UNITS: 5000 INJECTION, SOLUTION INTRAVENOUS; SUBCUTANEOUS at 23:56

## 2023-04-30 RX ADMIN — Medication 3 MG: at 19:52

## 2023-04-30 RX ADMIN — Medication 99 MG: at 08:55

## 2023-04-30 RX ADMIN — SENNOSIDES AND DOCUSATE SODIUM 1 TABLET: 50; 8.6 TABLET ORAL at 08:54

## 2023-04-30 RX ADMIN — HYDROCHLOROTHIAZIDE 25 MG: 25 TABLET ORAL at 08:54

## 2023-04-30 RX ADMIN — LISINOPRIL 40 MG: 40 TABLET ORAL at 08:54

## 2023-04-30 ASSESSMENT — VISUAL ACUITY: OU: NORMAL ACUITY

## 2023-04-30 ASSESSMENT — ACTIVITIES OF DAILY LIVING (ADL)
ADLS_ACUITY_SCORE: 18

## 2023-04-30 NOTE — PLAN OF CARE
Major Shift Events:  A&O x4. PERRLA intact. L pupil returned to normal size after dilation from research team. EVD in place 15 above EAM, ICP 1-10 output 0-14/hr. Up x SBA, strong and steady on feet. Sinus/sinus krystal. SBP < 140, no PRNs needed. Vitals stable. Room air. Voids spontaneously using toilet and urinal. No BM this shift. Reg diet, tolerating well. Denies any pain.   Plan: Continue abx regimen. OR on 5/2.  Monitor and treat per plan of care.   For vital signs and complete assessments, please see documentation flowsheets.

## 2023-04-30 NOTE — PLAN OF CARE
ICU End of Shift Summary. See flowsheets for vital signs and detailed assessment.    Changes this shift: A&O x4. Follows commands appropriately, rates headache(described as incisional pain where EVD is) 2/10. Tylenol adm with relief.  ICPs 3-9 with outputs between 0-10. Up in room/halls multiple times. Small BMs today. Voids adequately via urinal    Plan: Will continue with POC and update team with changes. Plan for OR 5/2

## 2023-04-30 NOTE — PROGRESS NOTES
Neurocritical Care Multi-Disciplinary Note    Reason for critical care admission: s/p removal of shunt and  placement   Primary Team: neurosurgery   Date of Service:  04/30/2023  Date of Admission:  4/17/2023  Hospital Day: 14    Patient condition reviewed and discussed while on multidisciplinary rounds today. Please note these minor interventions that were initiated:  1. None    The Neurocritical Care service will continue to follow peripherally while the patient is within the ICU. We are readily available should issues arise. Please feel free to contact us for critical care issues with which we may be of assistance. For all other concerns, please contact primary service first.     Please contact NCC team phone at *[phone]    TEREZA Horvath, CNP  Neurocritical Care  *[phone]  Text Page

## 2023-04-30 NOTE — PROGRESS NOTES
[facility], [place]   04/30/2023  Neurosurgery Progress Note:    Assessment:  Edwin Clements is a 48-year-old man presenting with symptoms concerning for elevated intracranial pressure (progressive HA, blurry vision), with recent CT abdomen showing pseudocyst surrounding the distal catheter.      Patient is POD-5 s/p removal of shunt and EVD placement; POD-12 s/p bedside externalization of the shunt.    Plan:  - EVD at 15 cm above EAM, open  - Serial neuro exams  - Pain control  - HOB > 30 degrees  - Regular diet  - Bowel regimen  - PRN antiemetics  - Follow CSF and catheter tip cultures  - On ceftriaxone  - ID following- appreciate recommendations  - PT/OT  - SCDs and SQH for DVT proph  - OR scheduled for 5/2/2023  -----------------------------------  Mello Lane MD, PhD  PGY-2 Neurosurgery  Please page NSGY on call with questions      Interval History: No acute events overnights. Stable neurological exam.     Objective:   Temp:  [97.7  F (36.5  C)-98.3  F (36.8  C)] 97.7  F (36.5  C)  Pulse:  [42-61] 42  Resp:  [12-21] 20  BP: (107-133)/(74-92) 133/90  SpO2:  [96 %] 96 %  I/O last 3 completed shifts:  In: 790 [P.O.:580; I.V.:210]  Out: 1613 [Urine:1500; Other:113]    Gen: Appears comfortable, NAD  Neurologic:  - Alert & Oriented to person, place, time, and situation  - Follows commands briskly  - Speech fluent, spontaneous. No aphasia or dysarthria.  - No gaze preference. No apparent hemineglect.  - PERRL, EOMI  - Face symmetric with sensation intact to light touch  - Palate elevates symmetrically, uvula midline, tongue protrudes midline  - Trapezii muscles 5/5 bilaterally  - No pronator drift     Del Tr Bi WE WF Gr   R 5 5 5 5 5 5   L 5 5 5 5 5 5    HF KE KF DF PF EHL   R 5 5 5 5 5 5   L 5 5 5 5 5 5     Reflexes 2+ throughout    Sensation intact and symmetric to light touch throughout    LABS:  Recent Labs   Lab 04/30/23  0412 04/29/23  0417 04/28/23  0539    138 136    POTASSIUM 3.8 4.0 4.1   CHLORIDE 105 101 99   CO2 23 25 25   ANIONGAP 12 12 12   * 96 94   BUN 24.8* 18.4 14.1   CR 0.70 0.75 0.68   NIKO 9.4 9.6 9.7       Recent Labs   Lab 04/30/23  0412   WBC 5.7   RBC 4.49   HGB 12.9*   HCT 40.0   MCV 89   MCH 28.7   MCHC 32.3   RDW 13.4   *       IMAGING:  No results found for this or any previous visit (from the past 24 hour(s)).

## 2023-05-01 LAB
ABO/RH(D): NORMAL
ANION GAP SERPL CALCULATED.3IONS-SCNC: 13 MMOL/L (ref 7–15)
ANTIBODY SCREEN: NEGATIVE
APTT PPP: 31 SECONDS (ref 22–38)
BACTERIA CSF CULT: ABNORMAL
BACTERIA CSF CULT: NO GROWTH
BACTERIA CSF CULT: NO GROWTH
BUN SERPL-MCNC: 20.4 MG/DL (ref 6–20)
CALCIUM SERPL-MCNC: 9.7 MG/DL (ref 8.6–10)
CHLORIDE SERPL-SCNC: 102 MMOL/L (ref 98–107)
CREAT SERPL-MCNC: 0.68 MG/DL (ref 0.67–1.17)
CRP SERPL-MCNC: 6.85 MG/L
DEPRECATED HCO3 PLAS-SCNC: 23 MMOL/L (ref 22–29)
ERYTHROCYTE [DISTWIDTH] IN BLOOD BY AUTOMATED COUNT: 13.5 % (ref 10–15)
ERYTHROCYTE [SEDIMENTATION RATE] IN BLOOD BY WESTERGREN METHOD: 17 MM/HR (ref 0–15)
GFR SERPL CREATININE-BSD FRML MDRD: >90 ML/MIN/1.73M2
GLUCOSE SERPL-MCNC: 94 MG/DL (ref 70–99)
GRAM STAIN RESULT: ABNORMAL
GRAM STAIN RESULT: ABNORMAL
GRAM STAIN RESULT: NORMAL
GRAM STAIN RESULT: NORMAL
HCT VFR BLD AUTO: 40.4 % (ref 40–53)
HGB BLD-MCNC: 13.1 G/DL (ref 13.3–17.7)
INR PPP: 1.02 (ref 0.85–1.15)
MAGNESIUM SERPL-MCNC: 1.8 MG/DL (ref 1.7–2.3)
MCH RBC QN AUTO: 28.7 PG (ref 26.5–33)
MCHC RBC AUTO-ENTMCNC: 32.4 G/DL (ref 31.5–36.5)
MCV RBC AUTO: 88 FL (ref 78–100)
PHOSPHATE SERPL-MCNC: 4.5 MG/DL (ref 2.5–4.5)
PLATELET # BLD AUTO: 511 10E3/UL (ref 150–450)
POTASSIUM SERPL-SCNC: 3.8 MMOL/L (ref 3.4–5.3)
RBC # BLD AUTO: 4.57 10E6/UL (ref 4.4–5.9)
SODIUM SERPL-SCNC: 138 MMOL/L (ref 136–145)
SPECIMEN EXPIRATION DATE: NORMAL
WBC # BLD AUTO: 6.3 10E3/UL (ref 4–11)

## 2023-05-01 PROCEDURE — 83735 ASSAY OF MAGNESIUM: CPT

## 2023-05-01 PROCEDURE — 82310 ASSAY OF CALCIUM: CPT | Performed by: NEUROLOGICAL SURGERY

## 2023-05-01 PROCEDURE — 250N000013 HC RX MED GY IP 250 OP 250 PS 637: Performed by: STUDENT IN AN ORGANIZED HEALTH CARE EDUCATION/TRAINING PROGRAM

## 2023-05-01 PROCEDURE — 85027 COMPLETE CBC AUTOMATED: CPT | Performed by: NEUROLOGICAL SURGERY

## 2023-05-01 PROCEDURE — 250N000011 HC RX IP 250 OP 636: Performed by: STUDENT IN AN ORGANIZED HEALTH CARE EDUCATION/TRAINING PROGRAM

## 2023-05-01 PROCEDURE — 200N000002 HC R&B ICU UMMC

## 2023-05-01 PROCEDURE — 250N000011 HC RX IP 250 OP 636

## 2023-05-01 PROCEDURE — 86140 C-REACTIVE PROTEIN: CPT | Performed by: NEUROLOGICAL SURGERY

## 2023-05-01 PROCEDURE — 250N000013 HC RX MED GY IP 250 OP 250 PS 637: Performed by: NEUROLOGICAL SURGERY

## 2023-05-01 PROCEDURE — 85610 PROTHROMBIN TIME: CPT | Performed by: NURSE PRACTITIONER

## 2023-05-01 PROCEDURE — 99232 SBSQ HOSP IP/OBS MODERATE 35: CPT | Mod: 24 | Performed by: STUDENT IN AN ORGANIZED HEALTH CARE EDUCATION/TRAINING PROGRAM

## 2023-05-01 PROCEDURE — 85730 THROMBOPLASTIN TIME PARTIAL: CPT | Performed by: NURSE PRACTITIONER

## 2023-05-01 PROCEDURE — 86850 RBC ANTIBODY SCREEN: CPT | Performed by: NURSE PRACTITIONER

## 2023-05-01 PROCEDURE — 250N000011 HC RX IP 250 OP 636: Performed by: NEUROLOGICAL SURGERY

## 2023-05-01 PROCEDURE — 85652 RBC SED RATE AUTOMATED: CPT | Performed by: NEUROLOGICAL SURGERY

## 2023-05-01 PROCEDURE — 84100 ASSAY OF PHOSPHORUS: CPT

## 2023-05-01 RX ORDER — MAGNESIUM SULFATE HEPTAHYDRATE 40 MG/ML
2 INJECTION, SOLUTION INTRAVENOUS ONCE
Status: COMPLETED | OUTPATIENT
Start: 2023-05-01 | End: 2023-05-01

## 2023-05-01 RX ORDER — POTASSIUM CHLORIDE 750 MG/1
20 TABLET, EXTENDED RELEASE ORAL ONCE
Status: COMPLETED | OUTPATIENT
Start: 2023-05-01 | End: 2023-05-01

## 2023-05-01 RX ADMIN — LISINOPRIL 40 MG: 40 TABLET ORAL at 08:15

## 2023-05-01 RX ADMIN — ROPINIROLE HYDROCHLORIDE 0.25 MG: 0.25 TABLET, FILM COATED ORAL at 23:07

## 2023-05-01 RX ADMIN — CITALOPRAM HYDROBROMIDE 20 MG: 20 TABLET ORAL at 08:14

## 2023-05-01 RX ADMIN — Medication 1 DROP: at 08:22

## 2023-05-01 RX ADMIN — HYDROCHLOROTHIAZIDE 25 MG: 25 TABLET ORAL at 08:14

## 2023-05-01 RX ADMIN — MAGNESIUM SULFATE HEPTAHYDRATE 2 G: 40 INJECTION, SOLUTION INTRAVENOUS at 08:14

## 2023-05-01 RX ADMIN — METOPROLOL SUCCINATE 100 MG: 100 TABLET, EXTENDED RELEASE ORAL at 08:14

## 2023-05-01 RX ADMIN — CEFTRIAXONE SODIUM 2 G: 2 INJECTION, POWDER, FOR SOLUTION INTRAMUSCULAR; INTRAVENOUS at 08:13

## 2023-05-01 RX ADMIN — Medication 3 MG: at 21:39

## 2023-05-01 RX ADMIN — POTASSIUM CHLORIDE 20 MEQ: 750 TABLET, EXTENDED RELEASE ORAL at 08:14

## 2023-05-01 RX ADMIN — Medication 1 DROP: at 18:56

## 2023-05-01 RX ADMIN — CEFTRIAXONE SODIUM 2 G: 2 INJECTION, POWDER, FOR SOLUTION INTRAMUSCULAR; INTRAVENOUS at 20:21

## 2023-05-01 RX ADMIN — HEPARIN SODIUM 5000 UNITS: 5000 INJECTION, SOLUTION INTRAVENOUS; SUBCUTANEOUS at 08:15

## 2023-05-01 RX ADMIN — Medication 99 MG: at 08:17

## 2023-05-01 RX ADMIN — Medication 1 DROP: at 22:04

## 2023-05-01 RX ADMIN — Medication 1 DROP: at 14:16

## 2023-05-01 ASSESSMENT — ACTIVITIES OF DAILY LIVING (ADL)
ADLS_ACUITY_SCORE: 18

## 2023-05-01 NOTE — PROGRESS NOTES
Neurocritical Care Multi-Disciplinary Note    Reason for critical care admission: s/p removal of shunt and  placement   Primary Team: neurosurgery   Date of Service:  05/01/2023  Date of Admission:  4/17/2023  Hospital Day: 15    Patient condition reviewed and discussed while on multidisciplinary rounds today. Please note these minor interventions that were initiated:  1. None    The Neurocritical Care service will continue to follow peripherally while the patient is within the ICU. We are readily available should issues arise. Please feel free to contact us for critical care issues with which we may be of assistance. For all other concerns, please contact primary service first.     Please contact NCC team phone at *[phone].    TEREZA Sosa, CNP  Neurocritical Care *[phone]

## 2023-05-01 NOTE — PROGRESS NOTES
"[facility]  General Infectious Disease Progress Note     Patient:  Edwin Clements, Date of birth 1975, Medical record number [id number]  Date of Visit: May 1, 2023         Assessment and Recommendations:   Problem List:  1. Cutibacterium acnes  shunt infection               -CSF cultures 4/17 positive. Subsequent sampling abnormal through 4/24 thus far               -S/p  shunt removal with EVD placement 4/25  2. Abdominal loculated fluid collection c/f abscess associated with distal  shunt catheter  2. Headaches and blurry vision c/f shunt malfunction  3.  shunt for idiopathic intracranial HTN     Impression: Edwin Clements is a 48 year old male with PMHx significant for idiopathic intracranial HTN s/p  shunt (1991) who presents to the ED with headache and pressure c/f shunt malfunction.      CT head w/o e/o abnormalities. CT CAP at OSH 3/21/23 with \"16.9 cm loculated fluid collection in left mid abdomen associated with tip of  shunt catheter..\" Repeat CT AP 4/5 with decreased size of loculated fluid collection around 14.6cm. Repeat CT AP 4/19 with further decrease in abdominal fluid collection to 10.4 cm. IR evaluated as to whether abdominal fluid collection could be sampled and unfortunately there was no safe window of accesss for sampling or drain placement. Will need serial CT scans to monitor for resolution.      CSF sampled from  shunt continues to be positive via gram stain for GPBs quickly after collection which is concerning. Cell count appears to be downtrending. Cultures from CSF 4/17, 19-24 positive for C acnes. S/p entire  shunt removal by NSG 4/25 and EVD placement. We will follow IDSA guidelines regarding planning on replacement of  shunt- with C acnes, it is recommended to have a 7 day period after shunt removal with culture clearance prior to replacement. CSF culture on 4/26, 4/28, 4/30 no growth to date. Cell counts are normalizing.     Plan " to replace shunt on 5/2. Continue ceftriaxone for a 10 day course (today is day 7 of 10), to end May 4th.     Recommendations:  1. Continue to sample CSF to follow for culture clearance.   2.   Continue IV Ceftriaxone 2g q12hrs (CNS coverage) to end on 5/4/23 if cultures remain negative  3.   Plan to replace shunt on 5/2       ID will sign off. Please page if new questions arise.         Attestation:  I have reviewed today's vital signs, medications, labs and imaging.  Tyesha Watt MD  Pager [phone]          Interval History:       Patient feels well today, he has no complaints.          Review of Systems:   CONSTITUTIONAL:  No fevers or chills  EYES: negative for icterus  ENT:  negative for oral lesions, hearing loss, tinnitus and sore throat  RESPIRATORY:  negative for cough and dyspnea  CARDIOVASCULAR:  negative for chest pain, palpitations  GASTROINTESTINAL:  negative for nausea, vomiting, diarrhea and constipation  GENITOURINARY:  negative for dysuria  HEME:  No easy bruising/bleeding  INTEGUMENT:  negative for rash and pruritus  NEURO:  Negative for headache         Current Antimicrobials     Ceftriaxone 2g IV q12h d1: 4/19/23       Physical Exam:   Ranges for vital signs:  Temp:  [97.2  F (36.2  C)-98.2  F (36.8  C)] 98  F (36.7  C)  Pulse:  [44-80] 62  Resp:  [10-18] 15  BP: ()/(48-93) 119/88  SpO2:  [96 %-98 %] 97 %      Exam:  GENERAL:  well-developed, well-nourished, in no acute distress.   ENT:  Head is normocephalic, atraumatic. Oropharynx is moist without exudates or ulcers.  EYES:  Eyes have anicteric sclerae. PERRL.   NECK:  Supple.   SKIN:  No acute rashes.            Laboratory Data:     Creatinine   Date Value Ref Range Status   05/01/2023 0.68 0.67 - 1.17 mg/dL Final   04/30/2023 0.70 0.67 - 1.17 mg/dL Final   04/29/2023 0.75 0.67 - 1.17 mg/dL Final   04/28/2023 0.68 0.67 - 1.17 mg/dL Final   04/27/2023 0.73 0.67 - 1.17 mg/dL Final   03/26/2015 0.83 0.66 - 1.25 mg/dL Final      WBC   Date Value Ref Range Status   03/26/2015 7.6 4.0 - 11.0 10e9/L Final     WBC Count   Date Value Ref Range Status   05/01/2023 6.3 4.0 - 11.0 10e3/uL Final   04/30/2023 5.7 4.0 - 11.0 10e3/uL Final   04/29/2023 5.2 4.0 - 11.0 10e3/uL Final   04/28/2023 5.7 4.0 - 11.0 10e3/uL Final   04/27/2023 6.5 4.0 - 11.0 10e3/uL Final     Hemoglobin   Date Value Ref Range Status   05/01/2023 13.1 (L) 13.3 - 17.7 g/dL Final   03/26/2015 16.3 13.3 - 17.7 g/dL Final     Platelet Count   Date Value Ref Range Status   05/01/2023 511 (H) 150 - 450 10e3/uL Final   03/26/2015 308 150 - 450 10e9/L Final     Erythrocyte Sedimentation Rate   Date Value Ref Range Status   05/01/2023 17 (H) 0 - 15 mm/hr Final   04/29/2023 22 (H) 0 - 15 mm/hr Final     AST   Date Value Ref Range Status   04/17/2023 18 10 - 50 U/L Final     ALT   Date Value Ref Range Status   04/17/2023 9 (L) 10 - 50 U/L Final     Bilirubin Total   Date Value Ref Range Status   04/17/2023 0.4 <=1.2 mg/dL Final     Lab Results   Component Value Date     05/01/2023    BUN 20.4 (H) 05/01/2023    CO2 23 05/01/2023       Culture data:  4/30 CSF culture NGTD  4/28 CSF culture NGTD  4/26 CSF culture NGTD  4/24 Blood cultures NGTD  4/24 CSF culture: P. acnes

## 2023-05-01 NOTE — PLAN OF CARE
Major Shift Events:    EVD at 15 above. ICPs between 1-8. Output has been 0 to 3 and clear in color. No neuro deficits. Denies HA. Afebrile. SB/SR. SBP remains <140. Tolerating regular diet. AUO, up to toilet. Walked around unit multiple times today.       Plan: RTOR tomorrow for shunt replacement, notify NSG with any changes in pt condition.     For vital signs and complete assessments, please see documentation flowsheets.

## 2023-05-01 NOTE — PLAN OF CARE
Shift Summary ([id number]):  No acute changes. EVD remains at 15cm above EAM. ICP 1-9, output 0-16 ml/hr.    Assessment:  Neuro: A/Ox4. Following commands with equal strength in all extremities. PERRL.  Resp: RA.  Cardiac: SB 40-50's. BP WDL.  GI: Regular diet.  : Voids spontaneously, up ad manpreet to bathroom after EVD clamping.  Skin: No new skin concerns.  Line: PICC    For full assessment and vitals, please see flowsheets.    Plan: Continue antibiotics, OR 5/2 for  shunt replacement.    Elena Troncoso RN

## 2023-05-01 NOTE — PROGRESS NOTES
[facility], [place]   05/01/2023  Neurosurgery Progress Note:    Assessment:  Edwin Clements is a 48-year-old man presenting with symptoms concerning for elevated intracranial pressure (progressive HA, blurry vision), with recent CT abdomen showing pseudocyst surrounding the distal catheter.      Patient is POD-6 s/p removal of shunt and EVD placement; POD-13 s/p bedside externalization of the shunt.    Plan:  - EVD at 15 cm above EAM, open  - Serial neuro exams  - Pain control  - HOB > 30 degrees  - Regular diet  - Bowel regimen  - PRN antiemetics  - Follow CSF and catheter tip cultures  - On ceftriaxone  - ID following- appreciate recommendations  - PT/OT  - SCDs and SQH for DVT proph  - OR scheduled for 5/2/2023  - Anticipate discharge on 5/3/2023 - will need final antibiotic plan and home infusions coordinated prior to discharge    -----------------------------------  Dhara Mckee MD  Neurosurgery PGY3    Please contact neurosurgery resident on call with questions.    Dial * * [phone], enter [phone] when prompted.        Interval History: No acute events overnights. Stable neurological exam. No new cultures have returned positive.     Objective:   Temp:  [97.2  F (36.2  C)-98.2  F (36.8  C)] 97.2  F (36.2  C)  Pulse:  [42-80] 46  Resp:  [10-20] 10  BP: (100-133)/(60-98) 117/83  SpO2:  [96 %-98 %] 97 %  I/O last 3 completed shifts:  In: 1500 [P.O.:1300; I.V.:200]  Out: 514 [Urine:400; Other:114]    Gen: Appears comfortable, NAD  Neurologic:  - Alert & Oriented to person, place, time, and situation  - Follows commands briskly  - Speech fluent, spontaneous. No aphasia or dysarthria.  - No gaze preference. No apparent hemineglect.  - PERRL, EOMI  - Face symmetric with sensation intact to light touch  - Palate elevates symmetrically, uvula midline, tongue protrudes midline  - Trapezii muscles 5/5 bilaterally  - No pronator drift     Del Tr Bi WE WF Gr   R 5 5 5 5 5 5   L 5 5 5 5 5 5    HF  KE KF DF PF EHL   R 5 5 5 5 5 5   L 5 5 5 5 5 5     Reflexes 2+ throughout    Sensation intact and symmetric to light touch throughout    LABS:  Recent Labs   Lab 05/01/23 0407 04/30/23 0412 04/29/23 0417    140 138   POTASSIUM 3.8 3.8 4.0   CHLORIDE 102 105 101   CO2 23 23 25   ANIONGAP 13 12 12   GLC 94 104* 96   BUN 20.4* 24.8* 18.4   CR 0.68 0.70 0.75   NIKO 9.7 9.4 9.6       Recent Labs   Lab 05/01/23 0407   WBC 6.3   RBC 4.57   HGB 13.1*   HCT 40.4   MCV 88   MCH 28.7   MCHC 32.4   RDW 13.5   *       IMAGING:  No results found for this or any previous visit (from the past 24 hour(s)).

## 2023-05-02 ENCOUNTER — APPOINTMENT (OUTPATIENT)
Dept: GENERAL RADIOLOGY | Facility: CLINIC | Age: 48
End: 2023-05-02
Payer: COMMERCIAL

## 2023-05-02 ENCOUNTER — ANESTHESIA (OUTPATIENT)
Dept: SURGERY | Facility: CLINIC | Age: 48
End: 2023-05-02
Payer: COMMERCIAL

## 2023-05-02 ENCOUNTER — APPOINTMENT (OUTPATIENT)
Dept: CT IMAGING | Facility: CLINIC | Age: 48
End: 2023-05-02
Payer: COMMERCIAL

## 2023-05-02 ENCOUNTER — ANESTHESIA EVENT (OUTPATIENT)
Dept: SURGERY | Facility: CLINIC | Age: 48
End: 2023-05-02
Payer: COMMERCIAL

## 2023-05-02 LAB
ANION GAP SERPL CALCULATED.3IONS-SCNC: 12 MMOL/L (ref 7–15)
ANION GAP SERPL CALCULATED.3IONS-SCNC: 13 MMOL/L (ref 7–15)
BUN SERPL-MCNC: 16 MG/DL (ref 6–20)
BUN SERPL-MCNC: 17.5 MG/DL (ref 6–20)
CALCIUM SERPL-MCNC: 9.3 MG/DL (ref 8.6–10)
CALCIUM SERPL-MCNC: 9.5 MG/DL (ref 8.6–10)
CHLORIDE SERPL-SCNC: 101 MMOL/L (ref 98–107)
CHLORIDE SERPL-SCNC: 103 MMOL/L (ref 98–107)
CREAT SERPL-MCNC: 0.66 MG/DL (ref 0.67–1.17)
CREAT SERPL-MCNC: 0.8 MG/DL (ref 0.67–1.17)
DEPRECATED HCO3 PLAS-SCNC: 23 MMOL/L (ref 22–29)
DEPRECATED HCO3 PLAS-SCNC: 24 MMOL/L (ref 22–29)
ERYTHROCYTE [DISTWIDTH] IN BLOOD BY AUTOMATED COUNT: 13.5 % (ref 10–15)
GFR SERPL CREATININE-BSD FRML MDRD: >90 ML/MIN/1.73M2
GFR SERPL CREATININE-BSD FRML MDRD: >90 ML/MIN/1.73M2
GLUCOSE SERPL-MCNC: 145 MG/DL (ref 70–99)
GLUCOSE SERPL-MCNC: 97 MG/DL (ref 70–99)
HCT VFR BLD AUTO: 42.1 % (ref 40–53)
HGB BLD-MCNC: 13.9 G/DL (ref 13.3–17.7)
MAGNESIUM SERPL-MCNC: 2.1 MG/DL (ref 1.7–2.3)
MCH RBC QN AUTO: 28.9 PG (ref 26.5–33)
MCHC RBC AUTO-ENTMCNC: 33 G/DL (ref 31.5–36.5)
MCV RBC AUTO: 88 FL (ref 78–100)
PHOSPHATE SERPL-MCNC: 5 MG/DL (ref 2.5–4.5)
PLATELET # BLD AUTO: 557 10E3/UL (ref 150–450)
POTASSIUM SERPL-SCNC: 3.6 MMOL/L (ref 3.4–5.3)
POTASSIUM SERPL-SCNC: 3.7 MMOL/L (ref 3.4–5.3)
RBC # BLD AUTO: 4.81 10E6/UL (ref 4.4–5.9)
SODIUM SERPL-SCNC: 138 MMOL/L (ref 136–145)
SODIUM SERPL-SCNC: 138 MMOL/L (ref 136–145)
WBC # BLD AUTO: 7 10E3/UL (ref 4–11)

## 2023-05-02 PROCEDURE — 250N000011 HC RX IP 250 OP 636: Performed by: NEUROLOGICAL SURGERY

## 2023-05-02 PROCEDURE — 70450 CT HEAD/BRAIN W/O DYE: CPT | Mod: 77

## 2023-05-02 PROCEDURE — 62223 ESTABLISH BRAIN CAVITY SHUNT: CPT | Mod: 62 | Performed by: NEUROLOGICAL SURGERY

## 2023-05-02 PROCEDURE — 370N000017 HC ANESTHESIA TECHNICAL FEE, PER MIN: Performed by: NEUROLOGICAL SURGERY

## 2023-05-02 PROCEDURE — 999N000065 XR SHUNT MALFUNCTION SURVEY

## 2023-05-02 PROCEDURE — 80048 BASIC METABOLIC PNL TOTAL CA: CPT | Performed by: NURSE PRACTITIONER

## 2023-05-02 PROCEDURE — 250N000011 HC RX IP 250 OP 636: Performed by: NURSE PRACTITIONER

## 2023-05-02 PROCEDURE — 710N000011 HC RECOVERY PHASE 1, LEVEL 3, PER MIN: Performed by: NEUROLOGICAL SURGERY

## 2023-05-02 PROCEDURE — 999N000157 HC STATISTIC RCP TIME EA 10 MIN

## 2023-05-02 PROCEDURE — 00160J6 BYPASS CEREBRAL VENTRICLE TO PERITONEAL CAVITY WITH SYNTHETIC SUBSTITUTE, OPEN APPROACH: ICD-10-PCS | Performed by: NEUROLOGICAL SURGERY

## 2023-05-02 PROCEDURE — 250N000009 HC RX 250: Performed by: NEUROLOGICAL SURGERY

## 2023-05-02 PROCEDURE — 250N000009 HC RX 250: Performed by: NURSE ANESTHETIST, CERTIFIED REGISTERED

## 2023-05-02 PROCEDURE — 80048 BASIC METABOLIC PNL TOTAL CA: CPT | Performed by: NEUROLOGICAL SURGERY

## 2023-05-02 PROCEDURE — 250N000025 HC SEVOFLURANE, PER MIN: Performed by: NEUROLOGICAL SURGERY

## 2023-05-02 PROCEDURE — 70450 CT HEAD/BRAIN W/O DYE: CPT | Mod: 26 | Performed by: STUDENT IN AN ORGANIZED HEALTH CARE EDUCATION/TRAINING PROGRAM

## 2023-05-02 PROCEDURE — C1894 INTRO/SHEATH, NON-LASER: HCPCS | Performed by: NEUROLOGICAL SURGERY

## 2023-05-02 PROCEDURE — 272N000001 HC OR GENERAL SUPPLY STERILE: Performed by: NEUROLOGICAL SURGERY

## 2023-05-02 PROCEDURE — 85027 COMPLETE CBC AUTOMATED: CPT | Performed by: NEUROLOGICAL SURGERY

## 2023-05-02 PROCEDURE — 74019 RADEX ABDOMEN 2 VIEWS: CPT | Mod: 26 | Performed by: STUDENT IN AN ORGANIZED HEALTH CARE EDUCATION/TRAINING PROGRAM

## 2023-05-02 PROCEDURE — 250N000013 HC RX MED GY IP 250 OP 250 PS 637: Performed by: STUDENT IN AN ORGANIZED HEALTH CARE EDUCATION/TRAINING PROGRAM

## 2023-05-02 PROCEDURE — 272N000480 CT HEAD W/O CONTRAST

## 2023-05-02 PROCEDURE — 61781 SCAN PROC CRANIAL INTRA: CPT | Performed by: NEUROLOGICAL SURGERY

## 2023-05-02 PROCEDURE — 250N000011 HC RX IP 250 OP 636: Performed by: ANESTHESIOLOGY

## 2023-05-02 PROCEDURE — 250N000011 HC RX IP 250 OP 636: Performed by: NURSE ANESTHETIST, CERTIFIED REGISTERED

## 2023-05-02 PROCEDURE — 70250 X-RAY EXAM OF SKULL: CPT | Mod: 26 | Performed by: STUDENT IN AN ORGANIZED HEALTH CARE EDUCATION/TRAINING PROGRAM

## 2023-05-02 PROCEDURE — 84100 ASSAY OF PHOSPHORUS: CPT

## 2023-05-02 PROCEDURE — 70450 CT HEAD/BRAIN W/O DYE: CPT | Mod: 26 | Performed by: RADIOLOGY

## 2023-05-02 PROCEDURE — 250N000013 HC RX MED GY IP 250 OP 250 PS 637

## 2023-05-02 PROCEDURE — 250N000009 HC RX 250: Performed by: ANESTHESIOLOGY

## 2023-05-02 PROCEDURE — 278N000051 HC OR IMPLANT GENERAL: Performed by: NEUROLOGICAL SURGERY

## 2023-05-02 PROCEDURE — 83735 ASSAY OF MAGNESIUM: CPT

## 2023-05-02 PROCEDURE — 200N000002 HC R&B ICU UMMC

## 2023-05-02 PROCEDURE — 258N000003 HC RX IP 258 OP 636: Performed by: ANESTHESIOLOGY

## 2023-05-02 PROCEDURE — 71045 X-RAY EXAM CHEST 1 VIEW: CPT | Mod: 26 | Performed by: STUDENT IN AN ORGANIZED HEALTH CARE EDUCATION/TRAINING PROGRAM

## 2023-05-02 PROCEDURE — 8E09XBG COMPUTER ASSISTED PROCEDURE OF HEAD AND NECK REGION, WITH COMPUTERIZED TOMOGRAPHY: ICD-10-PCS | Performed by: NEUROLOGICAL SURGERY

## 2023-05-02 PROCEDURE — 0WJG4ZZ INSPECTION OF PERITONEAL CAVITY, PERCUTANEOUS ENDOSCOPIC APPROACH: ICD-10-PCS | Performed by: NEUROLOGICAL SURGERY

## 2023-05-02 PROCEDURE — 999N000076 HC STATISTIC ICP MONITORING

## 2023-05-02 PROCEDURE — 360N000086 HC SURGERY LEVEL 6 W/ FLUORO, PER MIN: Performed by: NEUROLOGICAL SURGERY

## 2023-05-02 PROCEDURE — 250N000011 HC RX IP 250 OP 636

## 2023-05-02 PROCEDURE — 62223 ESTABLISH BRAIN CAVITY SHUNT: CPT | Mod: 62 | Performed by: SURGERY

## 2023-05-02 PROCEDURE — 258N000003 HC RX IP 258 OP 636

## 2023-05-02 DEVICE — IMPLANTABLE DEVICE: Type: IMPLANTABLE DEVICE | Site: SCALP | Status: FUNCTIONAL

## 2023-05-02 DEVICE — SHUNT CATH PERITONEAL ANTIBIOTIC-IMPREG ARES 122CM 93092: Type: IMPLANTABLE DEVICE | Site: ABDOMEN | Status: FUNCTIONAL

## 2023-05-02 DEVICE — SHUNT CATH VENTRICULAR ANTIBIOTIC-IMPREG ARES 23CM 91101: Type: IMPLANTABLE DEVICE | Site: BRAIN | Status: FUNCTIONAL

## 2023-05-02 RX ORDER — ONDANSETRON 2 MG/ML
4 INJECTION INTRAMUSCULAR; INTRAVENOUS EVERY 6 HOURS PRN
Status: DISCONTINUED | OUTPATIENT
Start: 2023-05-02 | End: 2023-05-03 | Stop reason: HOSPADM

## 2023-05-02 RX ORDER — ACETAMINOPHEN 325 MG/1
650 TABLET ORAL EVERY 4 HOURS PRN
Status: DISCONTINUED | OUTPATIENT
Start: 2023-05-02 | End: 2023-05-03 | Stop reason: HOSPADM

## 2023-05-02 RX ORDER — SODIUM CHLORIDE, SODIUM GLUCONATE, SODIUM ACETATE, POTASSIUM CHLORIDE AND MAGNESIUM CHLORIDE 526; 502; 368; 37; 30 MG/100ML; MG/100ML; MG/100ML; MG/100ML; MG/100ML
INJECTION, SOLUTION INTRAVENOUS CONTINUOUS PRN
Status: DISCONTINUED | OUTPATIENT
Start: 2023-05-02 | End: 2023-05-02

## 2023-05-02 RX ORDER — ACETAMINOPHEN 325 MG/1
975 TABLET ORAL EVERY 8 HOURS
Status: DISCONTINUED | OUTPATIENT
Start: 2023-05-02 | End: 2023-05-02

## 2023-05-02 RX ORDER — OXYCODONE HYDROCHLORIDE 5 MG/1
5 TABLET ORAL EVERY 4 HOURS PRN
Status: DISCONTINUED | OUTPATIENT
Start: 2023-05-02 | End: 2023-05-02

## 2023-05-02 RX ORDER — ONDANSETRON 4 MG/1
4 TABLET, ORALLY DISINTEGRATING ORAL EVERY 30 MIN PRN
Status: DISCONTINUED | OUTPATIENT
Start: 2023-05-02 | End: 2023-05-02

## 2023-05-02 RX ORDER — LIDOCAINE 40 MG/G
CREAM TOPICAL
Status: DISCONTINUED | OUTPATIENT
Start: 2023-05-02 | End: 2023-05-03 | Stop reason: HOSPADM

## 2023-05-02 RX ORDER — BISACODYL 10 MG
10 SUPPOSITORY, RECTAL RECTAL DAILY PRN
Status: DISCONTINUED | OUTPATIENT
Start: 2023-05-02 | End: 2023-05-03 | Stop reason: HOSPADM

## 2023-05-02 RX ORDER — LABETALOL HYDROCHLORIDE 5 MG/ML
10-40 INJECTION, SOLUTION INTRAVENOUS EVERY 10 MIN PRN
Status: DISCONTINUED | OUTPATIENT
Start: 2023-05-02 | End: 2023-05-03 | Stop reason: HOSPADM

## 2023-05-02 RX ORDER — ONDANSETRON 2 MG/ML
4 INJECTION INTRAMUSCULAR; INTRAVENOUS EVERY 30 MIN PRN
Status: DISCONTINUED | OUTPATIENT
Start: 2023-05-02 | End: 2023-05-02

## 2023-05-02 RX ORDER — CEFAZOLIN SODIUM 2 G/100ML
2 INJECTION, SOLUTION INTRAVENOUS SEE ADMIN INSTRUCTIONS
Status: DISCONTINUED | OUTPATIENT
Start: 2023-05-02 | End: 2023-05-02 | Stop reason: HOSPADM

## 2023-05-02 RX ORDER — FENTANYL CITRATE 50 UG/ML
INJECTION, SOLUTION INTRAMUSCULAR; INTRAVENOUS PRN
Status: DISCONTINUED | OUTPATIENT
Start: 2023-05-02 | End: 2023-05-02

## 2023-05-02 RX ORDER — HYDRALAZINE HYDROCHLORIDE 20 MG/ML
10-20 INJECTION INTRAMUSCULAR; INTRAVENOUS EVERY 30 MIN PRN
Status: DISCONTINUED | OUTPATIENT
Start: 2023-05-02 | End: 2023-05-03 | Stop reason: HOSPADM

## 2023-05-02 RX ORDER — LIDOCAINE HYDROCHLORIDE AND EPINEPHRINE 10; 10 MG/ML; UG/ML
INJECTION, SOLUTION INFILTRATION; PERINEURAL PRN
Status: DISCONTINUED | OUTPATIENT
Start: 2023-05-02 | End: 2023-05-02 | Stop reason: HOSPADM

## 2023-05-02 RX ORDER — CEFAZOLIN SODIUM 2 G/100ML
2 INJECTION, SOLUTION INTRAVENOUS
Status: COMPLETED | OUTPATIENT
Start: 2023-05-02 | End: 2023-05-02

## 2023-05-02 RX ORDER — LIDOCAINE HYDROCHLORIDE 20 MG/ML
INJECTION, SOLUTION INFILTRATION; PERINEURAL PRN
Status: DISCONTINUED | OUTPATIENT
Start: 2023-05-02 | End: 2023-05-02

## 2023-05-02 RX ORDER — FENTANYL CITRATE 50 UG/ML
25 INJECTION, SOLUTION INTRAMUSCULAR; INTRAVENOUS EVERY 5 MIN PRN
Status: DISCONTINUED | OUTPATIENT
Start: 2023-05-02 | End: 2023-05-02

## 2023-05-02 RX ORDER — SODIUM CHLORIDE 9 MG/ML
INJECTION, SOLUTION INTRAVENOUS CONTINUOUS
Status: DISCONTINUED | OUTPATIENT
Start: 2023-05-02 | End: 2023-05-02

## 2023-05-02 RX ORDER — ACETAMINOPHEN 325 MG/1
975 TABLET ORAL ONCE
Status: DISCONTINUED | OUTPATIENT
Start: 2023-05-02 | End: 2023-05-02

## 2023-05-02 RX ORDER — KETOROLAC TROMETHAMINE 30 MG/ML
15 INJECTION, SOLUTION INTRAMUSCULAR; INTRAVENOUS
Status: COMPLETED | OUTPATIENT
Start: 2023-05-02 | End: 2023-05-02

## 2023-05-02 RX ORDER — PROCHLORPERAZINE MALEATE 5 MG
10 TABLET ORAL EVERY 6 HOURS PRN
Status: DISCONTINUED | OUTPATIENT
Start: 2023-05-02 | End: 2023-05-03 | Stop reason: HOSPADM

## 2023-05-02 RX ORDER — HYDROMORPHONE HCL IN WATER/PF 6 MG/30 ML
0.4 PATIENT CONTROLLED ANALGESIA SYRINGE INTRAVENOUS EVERY 5 MIN PRN
Status: DISCONTINUED | OUTPATIENT
Start: 2023-05-02 | End: 2023-05-02

## 2023-05-02 RX ORDER — POLYETHYLENE GLYCOL 3350 17 G/17G
17 POWDER, FOR SOLUTION ORAL DAILY
Status: DISCONTINUED | OUTPATIENT
Start: 2023-05-03 | End: 2023-05-03 | Stop reason: HOSPADM

## 2023-05-02 RX ORDER — AMOXICILLIN 250 MG
1 CAPSULE ORAL 2 TIMES DAILY
Status: DISCONTINUED | OUTPATIENT
Start: 2023-05-02 | End: 2023-05-03 | Stop reason: HOSPADM

## 2023-05-02 RX ORDER — SODIUM CHLORIDE 9 MG/ML
INJECTION, SOLUTION INTRAVENOUS CONTINUOUS
Status: ACTIVE | OUTPATIENT
Start: 2023-05-02 | End: 2023-05-03

## 2023-05-02 RX ORDER — NALOXONE HYDROCHLORIDE 0.4 MG/ML
0.2 INJECTION, SOLUTION INTRAMUSCULAR; INTRAVENOUS; SUBCUTANEOUS
Status: DISCONTINUED | OUTPATIENT
Start: 2023-05-02 | End: 2023-05-03 | Stop reason: HOSPADM

## 2023-05-02 RX ORDER — NALOXONE HYDROCHLORIDE 0.4 MG/ML
0.4 INJECTION, SOLUTION INTRAMUSCULAR; INTRAVENOUS; SUBCUTANEOUS
Status: DISCONTINUED | OUTPATIENT
Start: 2023-05-02 | End: 2023-05-03 | Stop reason: HOSPADM

## 2023-05-02 RX ORDER — ESMOLOL HYDROCHLORIDE 10 MG/ML
INJECTION INTRAVENOUS PRN
Status: DISCONTINUED | OUTPATIENT
Start: 2023-05-02 | End: 2023-05-02

## 2023-05-02 RX ORDER — ONDANSETRON 2 MG/ML
INJECTION INTRAMUSCULAR; INTRAVENOUS PRN
Status: DISCONTINUED | OUTPATIENT
Start: 2023-05-02 | End: 2023-05-02

## 2023-05-02 RX ORDER — SODIUM CHLORIDE, SODIUM LACTATE, POTASSIUM CHLORIDE, CALCIUM CHLORIDE 600; 310; 30; 20 MG/100ML; MG/100ML; MG/100ML; MG/100ML
INJECTION, SOLUTION INTRAVENOUS CONTINUOUS
Status: DISCONTINUED | OUTPATIENT
Start: 2023-05-02 | End: 2023-05-02

## 2023-05-02 RX ORDER — ONDANSETRON 4 MG/1
4 TABLET, ORALLY DISINTEGRATING ORAL EVERY 6 HOURS PRN
Status: DISCONTINUED | OUTPATIENT
Start: 2023-05-02 | End: 2023-05-03 | Stop reason: HOSPADM

## 2023-05-02 RX ORDER — PROPOFOL 10 MG/ML
INJECTION, EMULSION INTRAVENOUS PRN
Status: DISCONTINUED | OUTPATIENT
Start: 2023-05-02 | End: 2023-05-02

## 2023-05-02 RX ORDER — DEXAMETHASONE SODIUM PHOSPHATE 4 MG/ML
INJECTION, SOLUTION INTRA-ARTICULAR; INTRALESIONAL; INTRAMUSCULAR; INTRAVENOUS; SOFT TISSUE PRN
Status: DISCONTINUED | OUTPATIENT
Start: 2023-05-02 | End: 2023-05-02

## 2023-05-02 RX ORDER — ACETAMINOPHEN 325 MG/1
650 TABLET ORAL EVERY 4 HOURS PRN
Status: DISCONTINUED | OUTPATIENT
Start: 2023-05-05 | End: 2023-05-02

## 2023-05-02 RX ORDER — OXYCODONE HYDROCHLORIDE 10 MG/1
10 TABLET ORAL EVERY 4 HOURS PRN
Status: DISCONTINUED | OUTPATIENT
Start: 2023-05-02 | End: 2023-05-02

## 2023-05-02 RX ORDER — EPHEDRINE SULFATE 50 MG/ML
INJECTION, SOLUTION INTRAMUSCULAR; INTRAVENOUS; SUBCUTANEOUS PRN
Status: DISCONTINUED | OUTPATIENT
Start: 2023-05-02 | End: 2023-05-02

## 2023-05-02 RX ORDER — POTASSIUM CHLORIDE 29.8 MG/ML
20 INJECTION INTRAVENOUS ONCE
Status: COMPLETED | OUTPATIENT
Start: 2023-05-02 | End: 2023-05-02

## 2023-05-02 RX ORDER — FENTANYL CITRATE 50 UG/ML
50 INJECTION, SOLUTION INTRAMUSCULAR; INTRAVENOUS EVERY 5 MIN PRN
Status: DISCONTINUED | OUTPATIENT
Start: 2023-05-02 | End: 2023-05-02

## 2023-05-02 RX ORDER — HYDROMORPHONE HCL IN WATER/PF 6 MG/30 ML
0.2 PATIENT CONTROLLED ANALGESIA SYRINGE INTRAVENOUS EVERY 5 MIN PRN
Status: DISCONTINUED | OUTPATIENT
Start: 2023-05-02 | End: 2023-05-02

## 2023-05-02 RX ORDER — HYDROMORPHONE HYDROCHLORIDE 4 MG/1
4 TABLET ORAL EVERY 4 HOURS PRN
Status: DISCONTINUED | OUTPATIENT
Start: 2023-05-02 | End: 2023-05-03 | Stop reason: HOSPADM

## 2023-05-02 RX ADMIN — SODIUM CHLORIDE, SODIUM GLUCONATE, SODIUM ACETATE, POTASSIUM CHLORIDE AND MAGNESIUM CHLORIDE: 526; 502; 368; 37; 30 INJECTION, SOLUTION INTRAVENOUS at 17:02

## 2023-05-02 RX ADMIN — ACETAMINOPHEN 975 MG: 325 TABLET ORAL at 21:03

## 2023-05-02 RX ADMIN — PROPOFOL 150 MG: 10 INJECTION, EMULSION INTRAVENOUS at 13:29

## 2023-05-02 RX ADMIN — SUGAMMADEX 200 MG: 100 INJECTION, SOLUTION INTRAVENOUS at 16:57

## 2023-05-02 RX ADMIN — HYDROMORPHONE HYDROCHLORIDE 0.2 MG: 0.2 INJECTION, SOLUTION INTRAMUSCULAR; INTRAVENOUS; SUBCUTANEOUS at 18:13

## 2023-05-02 RX ADMIN — Medication 20 MG: at 14:21

## 2023-05-02 RX ADMIN — SODIUM CHLORIDE: 9 INJECTION, SOLUTION INTRAVENOUS at 09:06

## 2023-05-02 RX ADMIN — FENTANYL CITRATE 50 MCG: 50 INJECTION, SOLUTION INTRAMUSCULAR; INTRAVENOUS at 17:55

## 2023-05-02 RX ADMIN — POTASSIUM CHLORIDE 20 MEQ: 29.8 INJECTION, SOLUTION INTRAVENOUS at 06:28

## 2023-05-02 RX ADMIN — FENTANYL CITRATE 100 MCG: 50 INJECTION, SOLUTION INTRAMUSCULAR; INTRAVENOUS at 16:52

## 2023-05-02 RX ADMIN — FENTANYL CITRATE 100 MCG: 50 INJECTION, SOLUTION INTRAMUSCULAR; INTRAVENOUS at 13:29

## 2023-05-02 RX ADMIN — FENTANYL CITRATE 50 MCG: 50 INJECTION, SOLUTION INTRAMUSCULAR; INTRAVENOUS at 17:50

## 2023-05-02 RX ADMIN — Medication 20 MG: at 15:21

## 2023-05-02 RX ADMIN — PHENYLEPHRINE HYDROCHLORIDE 100 MCG: 10 INJECTION INTRAVENOUS at 14:11

## 2023-05-02 RX ADMIN — HYDROMORPHONE HYDROCHLORIDE 0.2 MG: 0.2 INJECTION, SOLUTION INTRAMUSCULAR; INTRAVENOUS; SUBCUTANEOUS at 18:05

## 2023-05-02 RX ADMIN — DOCUSATE SODIUM 50 MG AND SENNOSIDES 8.6 MG 1 TABLET: 8.6; 5 TABLET, FILM COATED ORAL at 21:03

## 2023-05-02 RX ADMIN — Medication 50 MG: at 13:29

## 2023-05-02 RX ADMIN — DEXAMETHASONE SODIUM PHOSPHATE 8 MG: 4 INJECTION, SOLUTION INTRA-ARTICULAR; INTRALESIONAL; INTRAMUSCULAR; INTRAVENOUS; SOFT TISSUE at 14:15

## 2023-05-02 RX ADMIN — LIDOCAINE HYDROCHLORIDE 100 MG: 20 INJECTION, SOLUTION INFILTRATION; PERINEURAL at 13:29

## 2023-05-02 RX ADMIN — Medication 30 MG: at 14:54

## 2023-05-02 RX ADMIN — ESMOLOL HYDROCHLORIDE 30 MG: 10 INJECTION, SOLUTION INTRAVENOUS at 16:08

## 2023-05-02 RX ADMIN — KETOROLAC TROMETHAMINE 15 MG: 30 INJECTION, SOLUTION INTRAMUSCULAR; INTRAVENOUS at 18:49

## 2023-05-02 RX ADMIN — SODIUM CHLORIDE: 9 INJECTION, SOLUTION INTRAVENOUS at 21:04

## 2023-05-02 RX ADMIN — HYDROMORPHONE HYDROCHLORIDE 0.4 MG: 0.2 INJECTION, SOLUTION INTRAMUSCULAR; INTRAVENOUS; SUBCUTANEOUS at 18:36

## 2023-05-02 RX ADMIN — ESMOLOL HYDROCHLORIDE 20 MG: 10 INJECTION, SOLUTION INTRAVENOUS at 15:21

## 2023-05-02 RX ADMIN — Medication 1 DROP: at 06:37

## 2023-05-02 RX ADMIN — PROPOFOL 20 MG: 10 INJECTION, EMULSION INTRAVENOUS at 15:21

## 2023-05-02 RX ADMIN — ESMOLOL HYDROCHLORIDE 20 MG: 10 INJECTION, SOLUTION INTRAVENOUS at 14:57

## 2023-05-02 RX ADMIN — HYDROMORPHONE HYDROCHLORIDE 0.4 MG: 0.2 INJECTION, SOLUTION INTRAMUSCULAR; INTRAVENOUS; SUBCUTANEOUS at 18:45

## 2023-05-02 RX ADMIN — PROPOFOL 20 MG: 10 INJECTION, EMULSION INTRAVENOUS at 15:06

## 2023-05-02 RX ADMIN — HYDROMORPHONE HYDROCHLORIDE 0.4 MG: 0.2 INJECTION, SOLUTION INTRAMUSCULAR; INTRAVENOUS; SUBCUTANEOUS at 18:21

## 2023-05-02 RX ADMIN — Medication 2 G: at 13:45

## 2023-05-02 RX ADMIN — FENTANYL CITRATE 100 MCG: 50 INJECTION, SOLUTION INTRAMUSCULAR; INTRAVENOUS at 14:55

## 2023-05-02 RX ADMIN — HYDROMORPHONE HYDROCHLORIDE 0.5 MG: 1 INJECTION, SOLUTION INTRAMUSCULAR; INTRAVENOUS; SUBCUTANEOUS at 15:53

## 2023-05-02 RX ADMIN — SODIUM CHLORIDE: 9 INJECTION, SOLUTION INTRAVENOUS at 17:55

## 2023-05-02 RX ADMIN — HYDROMORPHONE HYDROCHLORIDE 0.4 MG: 0.2 INJECTION, SOLUTION INTRAMUSCULAR; INTRAVENOUS; SUBCUTANEOUS at 18:57

## 2023-05-02 RX ADMIN — PROPOFOL 50 MG: 10 INJECTION, EMULSION INTRAVENOUS at 13:33

## 2023-05-02 RX ADMIN — ESMOLOL HYDROCHLORIDE 20 MG: 10 INJECTION, SOLUTION INTRAVENOUS at 15:08

## 2023-05-02 RX ADMIN — Medication 5 MG: at 14:28

## 2023-05-02 RX ADMIN — OXYCODONE HYDROCHLORIDE 10 MG: 10 TABLET ORAL at 21:03

## 2023-05-02 RX ADMIN — ESMOLOL HYDROCHLORIDE 10 MG: 10 INJECTION, SOLUTION INTRAVENOUS at 15:06

## 2023-05-02 RX ADMIN — HYDROMORPHONE HYDROCHLORIDE 0.5 MG: 1 INJECTION, SOLUTION INTRAMUSCULAR; INTRAVENOUS; SUBCUTANEOUS at 15:56

## 2023-05-02 RX ADMIN — SODIUM CHLORIDE, SODIUM GLUCONATE, SODIUM ACETATE, POTASSIUM CHLORIDE AND MAGNESIUM CHLORIDE: 526; 502; 368; 37; 30 INJECTION, SOLUTION INTRAVENOUS at 13:21

## 2023-05-02 RX ADMIN — Medication 20 MG: at 15:53

## 2023-05-02 RX ADMIN — CEFTRIAXONE SODIUM 2 G: 2 INJECTION, POWDER, FOR SOLUTION INTRAMUSCULAR; INTRAVENOUS at 08:16

## 2023-05-02 RX ADMIN — ONDANSETRON 4 MG: 2 INJECTION INTRAMUSCULAR; INTRAVENOUS at 16:49

## 2023-05-02 ASSESSMENT — ACTIVITIES OF DAILY LIVING (ADL)
ADLS_ACUITY_SCORE: 18

## 2023-05-02 NOTE — PROGRESS NOTES
[facility], [place]   05/02/2023  Neurosurgery Progress Note:    Assessment:  Edwin Clements is a 48-year-old man presenting with symptoms concerning for elevated intracranial pressure (progressive HA, blurry vision), with recent CT abdomen showing pseudocyst surrounding the distal catheter.      Patient is POD-7 s/p removal of shunt and EVD placement; POD-14 s/p bedside externalization of the shunt.    Plan:  - EVD at 15 cm above EAM, open  - Serial neuro exams  - Pain control  - HOB > 30 degrees  - NPO for OR   - Bowel regimen  - PRN antiemetics  - Follow CSF and catheter tip cultures  - On ceftriaxone - to end May 4  - ID following- appreciate recommendations  - PT/OT  - SCDs and SQH for DVT proph  - OR scheduled for 5/2/2023  - Anticipate discharge on 5/3/2023 - will need home infusions coordinated prior to discharge    -----------------------------------  Dhara Mckee MD  Neurosurgery PGY3    Please contact neurosurgery resident on call with questions.    Dial * * [phone], enter [phone] when prompted.        Interval History: No acute events overnights. Stable neurological exam. Ready for OR today, consent obtained.     Objective:   Temp:  [97.5  F (36.4  C)-98.3  F (36.8  C)] 97.9  F (36.6  C)  Pulse:  [45-63] 53  Resp:  [10-22] 15  BP: ()/(48-93) 91/59  SpO2:  [96 %-98 %] 97 %  I/O last 3 completed shifts:  In: 1772 [P.O.:1497; I.V.:275]  Out: 66 [Other:66]    Gen: Appears comfortable, NAD  Neurologic:  - Alert & Oriented to person, place, time, and situation  - Follows commands briskly  - Speech fluent, spontaneous. No aphasia or dysarthria.  - No gaze preference. No apparent hemineglect.  - PERRL, EOMI  - Face symmetric with sensation intact to light touch  - Palate elevates symmetrically, uvula midline, tongue protrudes midline  - Trapezii muscles 5/5 bilaterally  - No pronator drift     Del Tr Bi WE WF Gr   R 5 5 5 5 5 5   L 5 5 5 5 5 5    HF KE KF DF PF EHL   R 5 5 5 5  5 5   L 5 5 5 5 5 5     Reflexes 2+ throughout    Sensation intact and symmetric to light touch throughout    LABS:  Recent Labs   Lab 05/01/23  0407 04/30/23  0412 04/29/23  0417    140 138   POTASSIUM 3.8 3.8 4.0   CHLORIDE 102 105 101   CO2 23 23 25   ANIONGAP 13 12 12   GLC 94 104* 96   BUN 20.4* 24.8* 18.4   CR 0.68 0.70 0.75   NIKO 9.7 9.4 9.6       Recent Labs   Lab 05/02/23  0405   WBC 7.0   RBC 4.81   HGB 13.9   HCT 42.1   MCV 88   MCH 28.9   MCHC 33.0   RDW 13.5   *       IMAGING:  No results found for this or any previous visit (from the past 24 hour(s)).

## 2023-05-02 NOTE — ANESTHESIA CARE TRANSFER NOTE
Patient: Edwin Clements    Procedure: Procedure(s):  Stealth Assisted INSERTION, SHUNT, VENTRICULOPERITONEAL, right possible left, explantation of external venticular drain  INSERTION, SHUNT, VENTRICULOPERITONEAL, LAPAROSCOPY-ASSISTED       Diagnosis:  (ventriculoperitoneal) shunt status [Z98.2]  Hydrocephalus, unspecified type (H) [G91.9]  Diagnosis Additional Information: No value filed.    Anesthesia Type:   General     Note:    Oropharynx: oropharynx clear of all foreign objects and spontaneously breathing  Level of Consciousness: awake  Oxygen Supplementation: nasal cannula  Level of Supplemental Oxygen (L/min / FiO2): 3  Independent Airway: airway patency satisfactory and stable  Dentition: dentition unchanged  Vital Signs Stable: post-procedure vital signs reviewed and stable  Report to RN Given: handoff report given  Patient transferred to: PACU    Handoff Report: Identifed the Patient, Identified the Reponsible Provider, Reviewed the pertinent medical history, Discussed the surgical course, Reviewed Intra-OP anesthesia mangement and issues during anesthesia, Set expectations for post-procedure period and Allowed opportunity for questions and acknowledgement of understanding      Vitals:  Vitals Value Taken Time   BP     Temp     Pulse     Resp     SpO2         Electronically Signed By: TEREZA Christine CRNA  May 2, 2023  5:22 PM

## 2023-05-02 NOTE — ANESTHESIA POSTPROCEDURE EVALUATION
Patient: Edwin Clements    Procedure: Procedure(s):  Stealth Assisted INSERTION, SHUNT, VENTRICULOPERITONEAL, right possible left, explantation of external venticular drain  INSERTION, SHUNT, VENTRICULOPERITONEAL, LAPAROSCOPY-ASSISTED       Anesthesia Type:  General    Note:     Postop Pain Control: Uneventful            Sign Out: Well controlled pain   PONV:    Neuro/Psych: Uneventful            Sign Out: Acceptable/Baseline neuro status   Airway/Respiratory: Uneventful            Sign Out: Acceptable/Baseline resp. status   CV/Hemodynamics: Uneventful            Sign Out: Acceptable CV status; No obvious hypovolemia; No obvious fluid overload   Other NRE: NONE   DID A NON-ROUTINE EVENT OCCUR?            Last vitals:  Vitals Value Taken Time   /86 05/02/23 1845   Temp 36.3  C (97.4  F) 05/02/23 1722   Pulse 83 05/02/23 1856   Resp 0 05/02/23 1856   SpO2 96 % 05/02/23 1856   Vitals shown include unvalidated device data.    Electronically Signed By: Ant Cordero MD  May 2, 2023  6:57 PM

## 2023-05-02 NOTE — PLAN OF CARE
Night shift [id number]    D/I/A:    Neuro-  Ventric patent.  ICP= 1-7 (with output 0-12cc/hr)    Denied HA, abdominal pain, or other pain overnight (only irritation/pressure in bilateral eyes from being dilated yesterday during the day time).    Went for scheduled head CT scan this am.    CV-  SBP remained less than 140 per order.    GI-  NPO at midnight for OR today.    Labs-  Labs monitored and reviewed.  Potassium replaced    ROUTINE ICU LABS (Last four results)  CMPRecent Labs   Lab 05/02/23  0405 05/01/23 0407 04/30/23 0412 04/29/23 0417    138 140 138   POTASSIUM 3.6 3.8 3.8 4.0   CHLORIDE 101 102 105 101   CO2 24 23 23 25   ANIONGAP 13 13 12 12   GLC 97 94 104* 96   BUN 17.5 20.4* 24.8* 18.4   CR 0.66* 0.68 0.70 0.75   GFRESTIMATED >90 >90 >90 >90   NIKO 9.5 9.7 9.4 9.6   MAG 2.1 1.8 2.0 2.0   PHOS 5.0* 4.5 4.0 4.7*     CBC  Recent Labs   Lab 05/02/23 0405 05/01/23 0407 04/30/23 0412 04/29/23 0417   WBC 7.0 6.3 5.7 5.2   RBC 4.81 4.57 4.49 4.63   HGB 13.9 13.1* 12.9* 13.3   HCT 42.1 40.4 40.0 41.0   MCV 88 88 89 89   MCH 28.9 28.7 28.7 28.7   MCHC 33.0 32.4 32.3 32.4   RDW 13.5 13.5 13.4 13.5   * 511* 500* 514*     INR  Recent Labs   Lab 05/01/23  1807   INR 1.02     Type and screen was drawn last night and result is in Epic.    P:  Continue to monitor.  Plan is for OR at 1155am today.   One hour before OR apply tj hugger on patient per pre op orders (this was passed onto day shift RN).  Will follow up with Neurosurgery team about starting MIVF.  See flowsheets for details.    Goal Outcome Evaluation:  Plan of Care Reviewed With: patient  Overall Patient Progress: improving

## 2023-05-02 NOTE — ANESTHESIA PROCEDURE NOTES
Airway       Patient location during procedure: OR       Procedure Start/Stop Times: 5/2/2023 1:33 PM  Staff -        Anesthesiologist:  Melecio Titus MD       Resident/Fellow: Nilam Butler MD       Other Anesthesia Staff: Rex Vinson APRN CRNA       Performed By: resident  Consent for Airway        Urgency: elective  Indications and Patient Condition       Indications for airway management: christohper-procedural       Induction type:intravenous       Mask difficulty assessment: 1 - vent by mask    Final Airway Details       Final airway type: endotracheal airway       Successful airway: ETT - single  Endotracheal Airway Details        ETT size (mm): 8.0       Cuffed: yes       Successful intubation technique: direct laryngoscopy       DL Blade Type: MAC 4       Grade View of Cords: 1       Adjucts: stylet       Position: Right       Measured from: lips       Secured at (cm): 24       Bite block used: None    Post intubation assessment        Placement verified by: capnometry, equal breath sounds and chest rise        Number of attempts at approach: 1       Number of other approaches attempted: 0       Secured with: pink tape and silk tape (Benzoin, Tegaderm)       Ease of procedure: easy       Dentition: Intact and Unchanged    Medication(s) Administered   Medication Administration Time: 5/2/2023 1:33 PM

## 2023-05-02 NOTE — ANESTHESIA PREPROCEDURE EVALUATION
Anesthesia Pre-Procedure Evaluation    Patient: Edwin Clements   MRN: [id number] : 1975        Procedure : Procedure(s):  Stealth Assisted INSERTION, SHUNT, VENTRICULOPERITONEAL, right possible left, explantation of external venticular drain  INSERTION, SHUNT, VENTRICULOPERITONEAL, LAPAROSCOPY-ASSISTED          Past Medical History:   Diagnosis Date     Hydrocephalus (H)      Hypertension      Pseudotumor cerebri       Past Surgical History:   Procedure Laterality Date     APPENDECTOMY       HC OR CATH ABLATION NON-CARDIAC ENDOVASCULAR       IMPLANT SHUNT VENTRICULOPERITONEAL       OPTICAL TRACKING SYSTEM VENTRICULOSTOMY Right 2023    Procedure: placement of external venticular drain;  Surgeon: Arnaud Martínez MD;  Location: [facility] OR     PICC SINGLE LUMEN PLACEMENT Right 2023    Right basilic vein 47cm total 0cm external.     REMOVE SHUNT VENTRICULOPERITONEAL N/A 2023    Procedure: REMOVAL, SHUNT, VENTRICULOPERITONEAL,;  Surgeon: Arnaud Martínez MD;  Location: [facility] OR     REVISE SHUNT VENTRICULARPERITONEAL Right 2015    Procedure: REVISE SHUNT VENTRICULARPERITONEAL;  Surgeon: Ulisses Kyle MD;  Location: [facility] OR      No Known Allergies   Social History     Tobacco Use     Smoking status: Former     Smokeless tobacco: Not on file   Vaping Use     Vaping status: Not on file   Substance Use Topics     Alcohol use: Yes      Wt Readings from Last 1 Encounters:   23 89.9 kg (198 lb 3.1 oz)        Anesthesia Evaluation   Pt has had prior anesthetic. Type: General.    No history of anesthetic complications       ROS/MED HX  ENT/Pulmonary:       Neurologic: Comment: history of idiopathic intracranial hypertension, with  shunt placed in .     Shunt removed on , EVD places    Pseudotumor Cerebri    Alert and oriented, worsened headaches and blurry vision      Cardiovascular:     (+) hypertension-----    METS/Exercise Tolerance:     Hematologic:        Musculoskeletal:       GI/Hepatic:       Renal/Genitourinary:       Endo:       Psychiatric/Substance Use:       Infectious Disease: Comment: Concerns for shunt infection    Continue IV Ceftriaxone 2g q12hrs (CNS coverage) to end on 5/4/23 if cultures remain negative      Malignancy:       Other:            Physical Exam    Airway  airway exam normal           Respiratory Devices and Support         Dental       (+) Completely normal teeth      Cardiovascular   cardiovascular exam normal          Pulmonary   pulmonary exam normal                OUTSIDE LABS:  CBC:   Lab Results   Component Value Date    WBC 7.0 05/02/2023    WBC 6.3 05/01/2023    HGB 13.9 05/02/2023    HGB 13.1 (L) 05/01/2023    HCT 42.1 05/02/2023    HCT 40.4 05/01/2023     (H) 05/02/2023     (H) 05/01/2023     BMP:   Lab Results   Component Value Date     05/02/2023     05/01/2023    POTASSIUM 3.6 05/02/2023    POTASSIUM 3.8 05/01/2023    CHLORIDE 101 05/02/2023    CHLORIDE 102 05/01/2023    CO2 24 05/02/2023    CO2 23 05/01/2023    BUN 17.5 05/02/2023    BUN 20.4 (H) 05/01/2023    CR 0.66 (L) 05/02/2023    CR 0.68 05/01/2023    GLC 97 05/02/2023    GLC 94 05/01/2023     COAGS:   Lab Results   Component Value Date    PTT 31 05/01/2023    INR 1.02 05/01/2023     POC: No results found for: BGM, HCG, HCGS  HEPATIC:   Lab Results   Component Value Date    ALBUMIN 4.2 04/17/2023    PROTTOTAL 7.7 04/17/2023    ALT 9 (L) 04/17/2023    AST 18 04/17/2023    ALKPHOS 90 04/17/2023    BILITOTAL 0.4 04/17/2023     OTHER:   Lab Results   Component Value Date    NIKO 9.5 05/02/2023    PHOS 5.0 (H) 05/02/2023    MAG 2.1 05/02/2023    SED 17 (H) 05/01/2023       Anesthesia Plan    ASA Status:  3      Anesthesia Type: General.     - Airway: ETT   Induction: Intravenous.   Maintenance: Balanced.   Techniques and Equipment:     - Lines/Monitors: 2nd IV, Arterial Line     - Blood: T&S     Consents    Anesthesia Plan(s) and associated risks,  benefits, and realistic alternatives discussed. Questions answered and patient/representative(s) expressed understanding.     - Discussed: Risks, Benefits and Alternatives for BOTH SEDATION and the PROCEDURE were discussed     - Discussed with:  Patient      - Extended Intubation/Ventilatory Support Discussed: No.      - Patient is DNR/DNI Status: No    Use of blood products discussed: No .     Postoperative Care    Pain management: IV analgesics, Oral pain medications, Multi-modal analgesia.     - Plan for long acting post-op opioid use   PONV prophylaxis: Ondansetron (or other 5HT-3), Dexamethasone or Solumedrol     Comments:                Nilam Butler MD

## 2023-05-02 NOTE — PROGRESS NOTES
Neurocritical Care Multi-Disciplinary Note    Reason for critical care admission:  shunt status   Primary Team: TIM[facility]  Date of Service:  05/02/2023  Date of Admission:  4/17/2023  Hospital Day: 16    Patient condition reviewed and discussed while on multidisciplinary rounds today. Please note these minor interventions that were initiated:  1. None    The Neurocritical Care service will continue to follow peripherally while the patient is within the ICU. We are readily available should issues arise. Please feel free to contact us for critical care issues with which we may be of assistance. For all other concerns, please contact primary service first.     Please contact NCC team phone at *[phone]    Katy STARKS CNP  Neurocritical care nurse practitioner  Pager: [phone]  Ascom: *[phone] [facility] [phone] [facility] [phone]

## 2023-05-02 NOTE — PLAN OF CARE
Major Shift Events:    Pt in OR most the day for  shunt revision. EVD pulled during surgery as well. Prior to, ICPs were between 1-6. Output has been 0 to 5 and clear in color. No neuro deficits. Denies HA. Afebrile. SB/SR. SBP remains <140. NPO all day dt surgery. AUO, up to toilet.         Plan: Tx to floor when bed becomes available, notify NSG with any changes in pt condition.

## 2023-05-03 VITALS
OXYGEN SATURATION: 97 % | WEIGHT: 201.06 LBS | DIASTOLIC BLOOD PRESSURE: 80 MMHG | RESPIRATION RATE: 18 BRPM | SYSTOLIC BLOOD PRESSURE: 108 MMHG | HEART RATE: 69 BPM | BODY MASS INDEX: 27.23 KG/M2 | HEIGHT: 72 IN | TEMPERATURE: 97.7 F

## 2023-05-03 LAB
ANION GAP SERPL CALCULATED.3IONS-SCNC: 11 MMOL/L (ref 7–15)
BACTERIA CSF CULT: NO GROWTH
BUN SERPL-MCNC: 15.2 MG/DL (ref 6–20)
CALCIUM SERPL-MCNC: 9.1 MG/DL (ref 8.6–10)
CHLORIDE SERPL-SCNC: 104 MMOL/L (ref 98–107)
CREAT SERPL-MCNC: 0.66 MG/DL (ref 0.67–1.17)
DEPRECATED HCO3 PLAS-SCNC: 22 MMOL/L (ref 22–29)
ERYTHROCYTE [DISTWIDTH] IN BLOOD BY AUTOMATED COUNT: 13.7 % (ref 10–15)
GFR SERPL CREATININE-BSD FRML MDRD: >90 ML/MIN/1.73M2
GLUCOSE SERPL-MCNC: 123 MG/DL (ref 70–99)
GRAM STAIN RESULT: NORMAL
GRAM STAIN RESULT: NORMAL
HCT VFR BLD AUTO: 39.2 % (ref 40–53)
HGB BLD-MCNC: 12.9 G/DL (ref 13.3–17.7)
MAGNESIUM SERPL-MCNC: 1.8 MG/DL (ref 1.7–2.3)
MCH RBC QN AUTO: 28.9 PG (ref 26.5–33)
MCHC RBC AUTO-ENTMCNC: 32.9 G/DL (ref 31.5–36.5)
MCV RBC AUTO: 88 FL (ref 78–100)
PHOSPHATE SERPL-MCNC: 3.9 MG/DL (ref 2.5–4.5)
PLATELET # BLD AUTO: 534 10E3/UL (ref 150–450)
POTASSIUM SERPL-SCNC: 4.1 MMOL/L (ref 3.4–5.3)
RBC # BLD AUTO: 4.46 10E6/UL (ref 4.4–5.9)
SODIUM SERPL-SCNC: 137 MMOL/L (ref 136–145)
WBC # BLD AUTO: 12.6 10E3/UL (ref 4–11)

## 2023-05-03 PROCEDURE — 250N000013 HC RX MED GY IP 250 OP 250 PS 637: Performed by: STUDENT IN AN ORGANIZED HEALTH CARE EDUCATION/TRAINING PROGRAM

## 2023-05-03 PROCEDURE — 80048 BASIC METABOLIC PNL TOTAL CA: CPT

## 2023-05-03 PROCEDURE — 258N000003 HC RX IP 258 OP 636

## 2023-05-03 PROCEDURE — 250N000013 HC RX MED GY IP 250 OP 250 PS 637

## 2023-05-03 PROCEDURE — 84100 ASSAY OF PHOSPHORUS: CPT

## 2023-05-03 PROCEDURE — 250N000011 HC RX IP 250 OP 636

## 2023-05-03 PROCEDURE — 85027 COMPLETE CBC AUTOMATED: CPT

## 2023-05-03 PROCEDURE — 83735 ASSAY OF MAGNESIUM: CPT

## 2023-05-03 RX ORDER — HYDROMORPHONE HYDROCHLORIDE 4 MG/1
4 TABLET ORAL EVERY 6 HOURS PRN
Qty: 15 TABLET | Refills: 0 | Status: SHIPPED | OUTPATIENT
Start: 2023-05-03

## 2023-05-03 RX ORDER — CEFTRIAXONE 2 G/1
2 INJECTION, POWDER, FOR SOLUTION INTRAMUSCULAR; INTRAVENOUS EVERY 12 HOURS
Qty: 320 ML | Refills: 0 | Status: ACTIVE | OUTPATIENT
Start: 2023-05-03 | End: 2023-05-11

## 2023-05-03 RX ORDER — HEPARIN SODIUM,PORCINE 10 UNIT/ML
10 VIAL (ML) INTRAVENOUS EVERY 24 HOURS
Qty: 10 ML | Refills: 0 | Status: SHIPPED | OUTPATIENT
Start: 2023-05-03

## 2023-05-03 RX ORDER — ROPINIROLE 0.25 MG/1
0.25 TABLET, FILM COATED ORAL 2 TIMES DAILY PRN
Qty: 15 TABLET | Refills: 0 | Status: SHIPPED | OUTPATIENT
Start: 2023-05-03

## 2023-05-03 RX ORDER — MAGNESIUM OXIDE 400 MG/1
400 TABLET ORAL EVERY 4 HOURS
Status: COMPLETED | OUTPATIENT
Start: 2023-05-03 | End: 2023-05-03

## 2023-05-03 RX ORDER — CEFTRIAXONE 2 G/1
2 INJECTION, POWDER, FOR SOLUTION INTRAMUSCULAR; INTRAVENOUS EVERY 12 HOURS
DISCHARGE
Start: 2023-05-03 | End: 2023-05-03

## 2023-05-03 RX ORDER — LANOLIN ALCOHOL/MO/W.PET/CERES
3 CREAM (GRAM) TOPICAL
Qty: 10 TABLET | Refills: 0 | Status: SHIPPED | OUTPATIENT
Start: 2023-05-03

## 2023-05-03 RX ORDER — POLYETHYLENE GLYCOL 3350 17 G/17G
17 POWDER, FOR SOLUTION ORAL DAILY
Qty: 510 G | Refills: 0 | Status: SHIPPED | OUTPATIENT
Start: 2023-05-03

## 2023-05-03 RX ADMIN — HYDROMORPHONE HYDROCHLORIDE 4 MG: 4 TABLET ORAL at 08:20

## 2023-05-03 RX ADMIN — ACETAMINOPHEN 650 MG: 325 TABLET ORAL at 08:20

## 2023-05-03 RX ADMIN — Medication 400 MG: at 06:19

## 2023-05-03 RX ADMIN — HYDROMORPHONE HYDROCHLORIDE 4 MG: 4 TABLET ORAL at 00:10

## 2023-05-03 RX ADMIN — CEFTRIAXONE SODIUM 2 G: 2 INJECTION, POWDER, FOR SOLUTION INTRAMUSCULAR; INTRAVENOUS at 08:20

## 2023-05-03 RX ADMIN — LISINOPRIL 40 MG: 40 TABLET ORAL at 08:20

## 2023-05-03 RX ADMIN — SODIUM CHLORIDE: 9 INJECTION, SOLUTION INTRAVENOUS at 06:19

## 2023-05-03 RX ADMIN — DOCUSATE SODIUM 50 MG AND SENNOSIDES 8.6 MG 1 TABLET: 8.6; 5 TABLET, FILM COATED ORAL at 08:20

## 2023-05-03 RX ADMIN — METOPROLOL SUCCINATE 100 MG: 100 TABLET, EXTENDED RELEASE ORAL at 08:19

## 2023-05-03 RX ADMIN — HYDROCHLOROTHIAZIDE 25 MG: 25 TABLET ORAL at 08:20

## 2023-05-03 RX ADMIN — ACETAMINOPHEN 650 MG: 325 TABLET ORAL at 12:33

## 2023-05-03 RX ADMIN — HYDROMORPHONE HYDROCHLORIDE 4 MG: 4 TABLET ORAL at 12:33

## 2023-05-03 RX ADMIN — HYDROMORPHONE HYDROCHLORIDE 4 MG: 4 TABLET ORAL at 04:13

## 2023-05-03 RX ADMIN — CITALOPRAM HYDROBROMIDE 20 MG: 20 TABLET ORAL at 08:19

## 2023-05-03 RX ADMIN — Medication 400 MG: at 10:12

## 2023-05-03 ASSESSMENT — ACTIVITIES OF DAILY LIVING (ADL)
ADLS_ACUITY_SCORE: 18

## 2023-05-03 NOTE — OP NOTE
[facility]    Operative Note    Pre-operative diagnosis:  (ventriculoperitoneal) shunt status [Z98.2]  Hydrocephalus, unspecified type (H) [G91.9]   Post-operative diagnosis * No post-op diagnosis entered *   Procedure: Procedure(s):  Stealth Assisted INSERTION, SHUNT, VENTRICULOPERITONEAL, right possible left, explantation of external venticular drain  INSERTION, SHUNT, VENTRICULOPERITONEAL, LAPAROSCOPY-ASSISTED; Laparoscopic Assisted  shunt placement;  shunt by neurosurgery    Surgeon: Surgeon(s) and Role:  Panel 1:     * Arnaud Martínez MD - Primary     * Mello Lane MD - Resident - Assisting  Panel 2:     * Irineo Adame MD - Primary   Anesthesia: General    Estimated blood loss: 5cc   Drains: None   Specimens: * No specimens in log *   Findings: Diffuse dense intraperitoneal adhesions   Complications: None.       INDICATIONS FOR PROCEDURE  This patient requires a  shunt.  Neurosurgery team has requested assistance for the abdominal portion of the shunt procedure and laparoscopy assistance is indicated. Informed consent was obtained prior to the operation.     OPERATIVE PROCEDURE: The patient was prepared in sterile fashion after induction of general anesthesia.     A left upper quadrant Veress needle was inserted and insufflation started with carbon dioxide with initial pressure of 5 mm Hg up to 14 mm Hg. At this time it was noted the abdomen was insufflating unevenly across the abdomen. Placement of a 5mm visiport was attempted however visualization was poor given dense adhesions. A supraumbilical 12 mm port was then placed using Mode technique. There were no injuries upon entry into the abdomen. Diffuse dense adhesions were noted, and the omentum was completely adhered to the anterior abdominal wall. The LUQ visiport was not completely intraperitoneal and was advanced through the adhered omentum under direct visualization.  The camera was moved to the LUQ port.       A counter incision had been made by the neurosurgery team. The 12mm port was removed and a finger was used to guide Tereza forceps through the abdominal wall and create a tract within the counter incision. The 10Fr peelaway sheath was placed through tract into the peritoneal space under direct visualization. The  shunt tubing was then passed through the sheath under direct visualization and all of the tubing was left in the peritoneal cavity at direction of the neurosurgery team.     The sheath was removed and we ensured that the tubing was not kinked.    Fascia was closed at the 12mm port site using 0-vicryl. Subcutaneous tissue over the counter incision was closed with 3-0 monocryl. Skin incisions were closed using 4-0 monocryl and steri strips.     All sponge and needle counts were correct x2 at the end of the case and the patient tolerated the procedure well.    Staff surgeon, Dr. Adame, was present for all critical components of this case.      Krissy Austin MD  Surgery PGY-2     Attending addendum:  I was present for entirety of procedure, agree with note.

## 2023-05-03 NOTE — OP NOTE
Procedure Date: 05/02/2023    PREOPERATIVE DIAGNOSES:    1.  Idiopathic intracranial hypertension.  2.  Status post explantation of ventriculoperitoneal shunt.    POSTOPERATIVE DIAGNOSES:    1.  Idiopathic intracranial hypertension.  2.  Status post explantation of ventriculoperitoneal shunt.    OPERATION PERFORMED:    1.  Left ventriculoperitoneal shunt placement with laparoscopic assistance, Medtronic Delta valve (performance setting 2.0).   2.  Use of frameless stereotaxy.    ATTENDING SURGEON:  Arnaud Martínez MD and Irineo Adame MD    RESIDENT SURGEON:  Mello Lane MD, PhD; Krissy Austin MD    ANESTHESIA:  General endotracheal.    ESTIMATED BLOOD LOSS:  20 mL.    IMPLANTS:    1. Codman ventriculoperitoneal catheter  2. Medtronic Delta valve, performance level 2.0.  3. Codman Bactiseal peritoneal catheter.    FINDINGS:  Brisk CSF egress upon entry into the ventricle under Stealth guidance.  Good hemostasis at the end of the case.    INDICATIONS FOR PROCEDURE:  The patient is a 48-year-old man with symptoms concerning for symptoms of intracranial hypertension including progressive headache and blurry vision s/p right ventriculoperitoneal shunt with recent CT abdomen showing a pseudocyst surrounding the distal catheter.  The patient had his shunt externalized and CSF cultures were positive for P. acnes.  He then had removal of his shunt and placement of external ventricular drain.  After being hospitalized with negative cultures for a week, risks and benefits were discussed, and the patient agreed to move forward with the above procedures.    DESCRIPTION OF PROCEDURE:  The patient was brought to the operating theater and transferred to the operating table in supine position.  He was intubated by our general anesthesia colleagues and appropriate IV access was obtained.  The bed was turned 180 degrees. Head was positioned in a horseshoe head frame.  Arms were tucked. The Axi neuronavigation was then  used to appropriately register the patient.  A left frontal C-shaped incision was planned on the scalp along with skip incisions along the left neck and a periumbilical incision in the left abdomen.  Perioperative antibiotics were administered.  Compression devices were applied.  The patient was prepped and draped in a standard sterile fashion.  A timeout was performed confirming procedure, site, and laterality along with administration of antibiotics.    A total of 10 mL of local anesthetic, lidocaine with epinephrine 1:100,000 was injected at the preplanned incision.  Next, we started with a #10 blade scalpel and made the incision down to the bone.  Periosteum was mobilized with a periosteal elevator.  The skin flap was reflected posteriorly and held in place with a 2-0 Vicryl stitch.  Stealth neuronavigation was used to identify the entry site.  We used a  drill to make a bur hole at the intended entry site.  Bone wax was applied for hemostasis.  Dura was coagulated with bipolar cautery.  At this time, we turned our incision to the abdomen.    A small incision was made at the preplanned incision in the periumbilical region.  We inserted the shunt tunneler towards the scalp incision.  This went without complications.  We used 2 skip incisions in the neck to complete tunneling to the scalp incision.  The distal peritoneal catheter was prepped in gentamicin solution and tied to the tunneler and was pulled through the tunneling sheath.  It was sequentially tied at the skip incisions with 2-0 silk ties and pulled through with uterine forcep to the scalp incision.  The Medtronic Delta valve with a program setting of 2.0 was flushed and attached to the distal catheter at the scalp site.  All attachment points were tied with silk ties and the valve was seated in the subgaleal pocket.  Next, we turned our incision back to the scalp.    We used a #15 blade scalpel to incise the dura and used bipolar cautery to  reflect the dural edges.  We then used the scalpel to make a small corticectomy.  We prepped the proximal ventricular catheter with the Stealth stylet.  Using neuronavigation, we passed the ventricular catheter through the bur hole to the trajectory to a depth of approximately 7.5 cm from the outer table.  When the stylet was removed, there was brisk flow of clear CSF.  The ventricular catheter was cut to the appropriate length, approximately 10 cm, and secured to the proximal end of the shunt valve with silk ties. Distal tubing was pulled through to the abdominal incision.  We confirmed spontaneous flow of CSF through the distal end of the peritoneal catheter.  The general surgery team then assisted with placing the catheter into the belly.  Please refer to their note for full details.  Next, we turned our attention to closing.    All wounds were copiously irrigated using antibiotic-impregnated saline.  The abdominal incisions were closed by the general surgery team.  The scalp incision was closed with 3-0 Vicryl suture in an inverted interrupted fashion for galea and 3-0 nylon suture in a simple running fashion for skin.  Bacitracin and sterile dressing were applied on top.  The neck incisions were closed with a 3-0 nylon in a simple running fashion and Primapore dressing was placed on top.  Drapes were taken down.  The patient was extubated, transferred back to the hospital bed, and taken to the recovery unit.  At the end of the case, all sponge, needle, and instrument counts were correct x2.      Dr. Martínez was scrubbed in for all critical portions of the case.    Arnaud Martínez MD    As Dictated by LO CERRATO M.D., Ph.D.        D: 2023   T: 2023   MT:     Name:     NERY MADRID  MRN:      [id number]        Account:        [id number]   :      1975           Procedure Date: 2023     Document: [id number]

## 2023-05-03 NOTE — OR NURSING
Pt states good pain control with Dilaudid 2mg, Toradol 15mg.   Neuro intact.   Transported monitored to CT and Xray. Transferred to room on 4A. VSS on transport.   Warm hand off to 4A RN. Neuro unchanged.

## 2023-05-03 NOTE — PLAN OF CARE
ICU End of Shift Summary. See flowsheets for vital signs and detailed assessment.    Changes this shift:  A&O x4. Follows commands appropriately, rates incisional pain @6/10, dilaudid PO and tylenol adm with relief. Up in room/halls multiple times.Tolerating diet well. Voids adequately via urinal.     Plan: Discharged home @ 2pm with wife, discharge instructions reviewed with patient, verbalized understanding. PICC dressing changed, per care coordinator, [facility] antibiotics [facility] Will have f/up with PCP in 1wk and follow up with Dr Martínez in person in 2wks.

## 2023-05-03 NOTE — PROGRESS NOTES
Home Infusion  Edwin is discharging today and will be going home on IV rocephin q 12 hrs.   He has never done home IV therapy before but he and his wife Carmella have been to the [facility] for teaching.  Benefits for home IV abx therapy have been checked and pt will have coverage through his [facility]  policy for the drug, supplies and home nursing/therapies subject to meeting his deductible and OOP expenses.    Met with Edwin and Carmella at bedside to discuss discharge plans, inform him of his coverage and offer choice of agency for the home infusion services.   Edwin stated he would like to remain in the /[employer] network so will use [facility].  Provided them with information about IV abx therapy with [facility] services. Explained about administration method which will be via IVP,  showed them the teaching materials and inquired about how the [facility] class went.  Edwin and Carmella said teaching went well and they feel comfortable managing independently as long as they get the printed teaching sheets which [facility] sends.   Informed them about supplies and delivery of supplies, storage of medication, dosing schedule and 24/7 support from [facility] staff while on IV therapy.   [facility] was unable to secure a home nursing agency to see Edwin therefore he will be going to [facility] for weekly PICC dressing changes and any labs that may be needed.    Edwin verbalized understanding of all information given.   He is willing and able to learn and manage home IV therapy and is agreeable with the plan.  Questions answered.  [facility] will deliver drug and supplies to Edwin's home before his evening dose is due.    Grisel SHEETS  Nurse Liaison  [facility] I [email or web address]  [address] [place] [address]  [email or web address]  [phone] M-F I [facility] main: [phone]

## 2023-05-03 NOTE — DISCHARGE SUMMARY
[facility] Discharge Summary and Instructions    Edwin Clements MRN# [id number]   Age: 48 year old YOB: 1975     Date of Admission:  4/17/2023  Date of Discharge::  5/3/2023  Admitting Physician:  Arnaud Martínez MD  Discharge Physician:  Arnaud Martínez MD          Admission Diagnoses:    (ventriculoperitoneal) shunt status [Z98.2]  Headache [R51.9]          Discharge Diagnosis:      (ventriculoperitoneal) shunt status [Z98.2]  Headache [R51.9]     Clinically Significant Risk Factors Present on Admission    # Hydrocephalus s/p  shunt  # Hypertension            Procedures:   Left ventriculoperitoneal shunt placement with laparoscopic assistance, Medtronic Delta valve (performance setting 2.0).            Brief History of Illness:   Edwin Clements is a 48-year-old man presenting with symptoms concerning for elevated intracranial pressure (progressive HA, blurry vision), with recent CT abdomen showing pseudocyst surrounding the distal catheter. Patient has elected to undergo above-mentioned procedure.           Hospital Course:   Patient was admitted on 4/17/2023 with blurry vision and headache.  His  shunt was tap and CSF was sent for cultures. On 4/18/2023, his  shunt was externalized. CSF gram stain and cultures were positive until 4/24/2023.  Infectious Disease was consulted and patient was started on IV ABx. Patient went to the OR for shunt removal and EVD placement. EVD remained in place until cultures were negative for 72 hours. On 5/2/2023, Patient has EVD removed and new  Shunt placed. Medtronic Delta valve (performance setting 2.0). The operation was uncomplicated. On post operative day 1, he was ambulating, voiding without a tam, eating a regular diet, pain was well controlled and therefore he was discharged to home. He will be discharged with PICC and IV ceftriaxone every 12 hours until 5/11/2023. Patient will follow-up with Dr. Martínez in 2 weeks.              Discharge Medications:     Current Discharge Medication List      START taking these medications    Details   cefTRIAXone (ROCEPHIN) 2 GM vial Inject 2 g into the vein every 12 hours for 8 days  Qty: 320 mL, Refills: 0    Associated Diagnoses:  (ventriculoperitoneal) shunt status      heparin lock flush 10 UNIT/ML SOLN injection 10 mLs by Intracatheter route every 24 hours  Qty: 10 mL, Refills: 0    Comments: Heparin lock PICC on 5/11/2023 after final ABX dose  Associated Diagnoses:  (ventriculoperitoneal) shunt status      HYDROmorphone (DILAUDID) 4 MG tablet Take 1 tablet (4 mg) by mouth every 6 hours as needed for severe pain  Qty: 15 tablet, Refills: 0    Associated Diagnoses:  (ventriculoperitoneal) shunt status      melatonin 3 MG tablet Take 1 tablet (3 mg) by mouth nightly as needed for sleep  Qty: 10 tablet, Refills: 0    Associated Diagnoses:  (ventriculoperitoneal) shunt status      polyethylene glycol (MIRALAX) 17 GM/Dose powder Take 17 g by mouth daily  Qty: 510 g, Refills: 0    Associated Diagnoses:  (ventriculoperitoneal) shunt status      rOPINIRole (REQUIP) 0.25 MG tablet Take 1 tablet (0.25 mg) by mouth 2 times daily as needed (Restless legs)  Qty: 15 tablet, Refills: 0    Associated Diagnoses:  (ventriculoperitoneal) shunt status      sodium chloride, PF, 0.9% PF flush 10 mLs by Intracatheter route every 12 hours for 8 days  Qty: 160 mL, Refills: 0    Comments: Flush after ABX dose  Associated Diagnoses:  (ventriculoperitoneal) shunt status         CONTINUE these medications which have NOT CHANGED    Details   citalopram (CELEXA) 20 MG tablet Take 20 mg by mouth daily      hydrochlorothiazide (HYDRODIURIL) 25 MG tablet Take 25 mg by mouth daily      lisinopril (PRINIVIL,ZESTRIL) 40 MG tablet Take 40 mg by mouth daily      metoprolol (TOPROL-XL) 100 MG 24 hr tablet Take 100 mg by mouth daily      potassium 99 MG TABS Take 1 tablet by mouth daily      senna-docusate  (SENOKOT-S;PERICOLACE) 8.6-50 MG per tablet Take 1 tablet by mouth 2 times daily  Qty: 60 tablet, Refills: 1    Associated Diagnoses: Acute postoperative pain                     Discharge Instructions and Follow-Up:     Discharge diet: Regular   Discharge activity: You may advance activity as tolerated. No strenuous exercise or heay lifting greater than 10 lbs for 4 weeks or until seen and cleared in clinic.     Discharge follow-up: Follow-up with Dr. Martínez in 2 weeks for wound check/post-hospital evaluation. All additional follow-up visits to be determined by Dr. Arnaud Martínez MD    Follow-up with PCP in 1 week for PICC dressing change    Follow-up with ID in 1 week for okay to end ABX and remove PICC    PICC can be removed at Dr. Martínez follow-up appointment, Follow-up with PCP for PICC removal if not completed then.        Wound care:     - Many shunts have programmable valves which are changed using strong magnets. If you are scheduled to undergo an MRI, please call our office so that we can make an appointment to have this reprogrammed within 48 hours.      - Please ask your surgeon or clinic for a card to identify the type of shunt you have and it s setting.  Medtronic Delta valve (performance setting 2.0).     After discharge, your activity restrictions are:   -We encourage short frequent walks, increasing as tolerated.  - No driving until you are seen in clinic and cleared by your neurosurgeon.  If you have had a seizure, you may not drive for at least 3 months according to [place] law.    - No strenuous activity.  - No lifting more than 10 pounds until you are seen in clinic (a gallon of milk weighs approximately 8 pounds)    Wound cares after surgery  - You are ok to shower, but do not soak your incisions. Pat them dry if they get damp.   - Avoid coloring your hair or permanent styling until cleared by your surgeon  - No baths, hot tubs or pools for 4-6 weeks after surgery.                   Please  call if you have:  1. increased pain, redness, drainage, swelling at your incision  2. fevers > 101.5 F degrees  3. with any questions or concerns.  You may reach the Neurosurgery clinic at [phone] during regular work hours. ER at [phone].    and ask for the Neurosurgery Resident on call at [phone], during off hours or weekends.         Discharge Disposition:     Discharged to home        TEREZA Vazquez, CNP  Neurosurgery  Pager [phone]

## 2023-05-03 NOTE — PROGRESS NOTES
Care Management Follow Up    Length of Stay (days): 16    Expected Discharge Date: 05/03/2023     Concerns to be Addressed: discharge planning     Patient plan of care discussed at interdisciplinary rounds: Yes    Anticipated Discharge Disposition: Home, Home Infusion     Anticipated Discharge Services:  Home infusion- [facility]  Anticipated Discharge DME: None    Education Provided on the Discharge Plan:  Yes  Patient/Family in Agreement with the Plan: Yes    Additional Information:  Team reported pt is medically ready for discharge today and will need to continue IV abx until 5/11/23.  Referral for [facility] were sent on 4/24 and pt has coverage for home IV abx.  RNCC provided update to [facility] liaison abut the discharge plan.   RNCC visited pt and spouse to discuss about the discharge planning.  Pt and spouse stated they had PLC for home IV abx and comfortable doing the administration at home.  Pt will not be home bound for home RN.  Pt stated he can go to his primary clinic for dressing change.  Pt and spouse declined for any other discharge assistance need.  RNCC shared the above info with Neurosurgery team.      Plan to discharge pt to home today with home IV abx and f/u with primary clinic for dressing change in a week.  Family will provide transportation.    RNCC called pt primary clinic ( Dr. Andrea, [place] office) #[phone] and informed them about the above plan.  Pt Clinic agreed to f/u with the dressing saenz and requested discharge order be faxed to fax # [phone].    [facility] - IV abx  Phone # [phone]  Fax # [phone]     Esther Anna RN, PHN, BSN  4A and 4E/ ICU  Care Coordinator  Phone: [phone]  Pager: [phone]    To contact the weekend RNCC  [place] (0800 - 1630) Saturday and Sunday   Units: 4A, 4C, 4E, 5A and 5B- Pager [phone]   Units: 6A, 6B- Pager [phone]   Unit : 6C, 6D- pager [phone]   Units: 7A, 7B, 7C, 7D, and 5C- Pager [phone]

## 2023-05-03 NOTE — PROGRESS NOTES
[facility], [place]   05/03/2023  Neurosurgery Progress Note:    Assessment:  Edwin Clements is a 48-year-old man presenting with symptoms concerning for elevated intracranial pressure (progressive HA, blurry vision), with recent CT abdomen showing pseudocyst surrounding the distal catheter.      Patient is POD-1 Left frontal ventriculoperitoneal shunt placement (Medtronic Delta valve fixed setting 2.0), POD-8 s/p removal of shunt and EVD placement; POD-15 s/p bedside externalization of the shunt.    Plan:  - Serial neuro exams  - Pain control  - HOB > 30 degrees  - Regular diet  - Bowel regimen  - PRN antiemetics  - Follow CSF and catheter tip cultures  - On ceftriaxone - to end May 4  - ID following- appreciate recommendations  - PT/OT  - SCDs and SQH for DVT proph  - Discharge today    -----------------------------------  Dhara Mckee MD  Neurosurgery PGY3    Please contact neurosurgery resident on call with questions.    Dial * * [phone], enter [phone] when prompted.        Interval History: No acute events overnights. Doing well postoperatively, head CT and shunt series with expected findings.     Objective:   Temp:  [97.4  F (36.3  C)-98.4  F (36.9  C)] 97.9  F (36.6  C)  Pulse:  [53-96] 59  Resp:  [12-26] 15  BP: (104-141)/(66-99) 110/71  SpO2:  [92 %-100 %] 97 %  I/O last 3 completed shifts:  In: 1993.33 [P.O.:410; I.V.:1583.33]  Out: 46 [Other:26; Blood:20]    Gen: Appears comfortable, NAD  Neurologic:  - Alert & Oriented to person, place, time, and situation  - Follows commands briskly  - Speech fluent, spontaneous. No aphasia or dysarthria.  - No gaze preference. No apparent hemineglect.  - PERRL, EOMI  - Face symmetric with sensation intact to light touch  - Palate elevates symmetrically, uvula midline, tongue protrudes midline  - Trapezii muscles 5/5 bilaterally  - No pronator drift     Del Tr Bi WE WF Gr   R 5 5 5 5 5 5   L 5 5 5 5 5 5    HF KE KF DF PF EHL   R 5 5 5 5 5 5    L 5 5 5 5 5 5     Reflexes 2+ throughout    Sensation intact and symmetric to light touch throughout    LABS:  Recent Labs   Lab 05/03/23  0410 05/02/23  1744 05/02/23  0405    138 138   POTASSIUM 4.1 3.7 3.6   CHLORIDE 104 103 101   CO2 22 23 24   ANIONGAP 11 12 13   * 145* 97   BUN 15.2 16.0 17.5   CR 0.66* 0.80 0.66*   NIKO 9.1 9.3 9.5       Recent Labs   Lab 05/03/23  0410   WBC 12.6*   RBC 4.46   HGB 12.9*   HCT 39.2*   MCV 88   MCH 28.9   MCHC 32.9   RDW 13.7   *       IMAGING:  Recent Results (from the past 24 hour(s))   CT Head w/o Contrast    Narrative    Stealth CT imaging for purposes of stereotactic evaluation    Provided History: pre op planning, with stealth and fiducials    Comparison: Head CT 4/26/2023    Technique: CT imaging performed with axial, sagittal, and coronal  reconstructed images obtained without intravenous contrast.    Contrast: None    Findings: Limited imaging for stereotactic localization demonstrates  no overt mass effect, midline shift, or acute intracranial hemorrhage.  Right frontal approach ventriculostomy catheter with tip near the  foramen of Bridges. The ventricles are not enlarged, and there is no  evidence of hydrocephalus. 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      Impression    Impression: No acute intracranial pathology. Stable positioning of  right frontal approach ventriculostomy catheter. Limited imaging  performed primarily for the purposes of stereotactic localization.    I have personally reviewed the examination and initial interpretation  and I agree with the findings.    JANI MORGAN MD         SYSTEM ID:  [id number]   CT Head w/o Contrast    Narrative    EXAM: CT HEAD W/O CONTRAST  5/2/2023 7:39 PM     HISTORY: evaluate placement of left  shunt       COMPARISON: Same day head CT    TECHNIQUE: Using multidetector thin collimation helical acquisition  technique, axial, coronal and sagittal CT images from the  skull base  to the vertex were obtained without intravenous contrast.   (topogram) image(s) also obtained and reviewed.    FINDINGS:    Interval removal of the right frontal approach ventriculostomy  catheter. Placement of new left frontal approach ventriculoperitoneal  shunt catheter with tip in the left lateral ventricle near the foramen  of Bridges. Small amount of postprocedural pneumocephalus. No  substantial change in caliber configuration of the ventricular system  without hydrocephalus. Stable retrovermian arachnoid cyst.    No acute intracranial hemorrhage or acute loss of gray-white matter  differentiation. Basal cisterns are clear. Orbits are grossly  unremarkable.    The bony calvaria and the bones of the skull base are normal. The  visualized portions of the paranasal sinuses and mastoid air cells are  clear. Grossly normal orbits.       Impression    IMPRESSION:   1. Interval removal of the right frontal approach ventriculostomy  catheter. Placement of new left frontal approach ventriculoperitoneal  shunt catheter with tip in the left lateral ventricle near the foramen  of Bridges. Small amount of expected postprocedural pneumocephalus.  2. No acute intracranial hemorrhage or evidence of acute infarct.    I have personally reviewed the examination and initial interpretation  and I agree with the findings.    ELOISA LÓPEZ MD         SYSTEM ID:  [id number]   XR Shunt Malfunction Surgery Survey    Narrative    EXAM: Shunt malfunction survey.    HISTORY: Evaluate intraventricular shunt    COMPARISON: Head CT 5/2/2023.    FINDINGS:   There is a left frontal approach ventriculoperitoneal shunt in the  skull that appears intact. The shunt in the neck appears intact.    In the thorax, the shunt appears intact. Cardiac silhouette appears  well-defined. Pulmonary vasculature appears distinct. No acute  airspace opacity.    In the abdomen, the course of the shunt is intact without focal kink  or break. Bowel  gas pattern is within normal limits. The tip of the  catheter is in the left lower abdomen.      Impression    IMPRESSION: Intact ventriculoperitoneal shunt.     I have personally reviewed the examination and initial interpretation  and I agree with the findings.    ELOISA LÓPEZ MD         SYSTEM ID:  [id number]

## 2023-05-03 NOTE — PHARMACY
[facility]  Parenteral ANtibiotic Review at Departure from Acute Care Collaborative Note     Patient: Edwin Clements  MRN: [id number]  Allergies: Patient has no known allergies.    Current Location:  4A  OPAT to be provided by: [facility]       Line Type: PICC    Diagnosis/Indications:  shunt infection s/p removal on 4/25 and replacement on 5/2  Organism(s): Cutibacterium acnes  MRDO? No  Pending Cultures/Microbiological Tests: no      Inpatient ID involved in developing OPAT plan: Yes - discharge OPAT plan has changes from when ID provider, Dr. Tyesha Watt, last evaluated OPAT plan on 5/1/2023, changes are reflected below    Outpatient ID Follow-up: Referred to another provider for follow-up - noted Outpatient ID Referral placed, however, the ID provider will serve as the designated OPAT provider with changes in previously developed OPAT plan  Designated Provider: Dr. Arnaud Martínez (neurosurgeon)    Antimicrobial Regimen / Route Anticipated  Duration Start Date Stop /  Reassess Date   Ceftriaxone 2 g every 12 hours/IV Per attending neurosurgeon 4/25/2023 (date of  shunt removal) 5/11/2023     Laboratory Tests and Monitoring Frequency: CBC with Diff, BMP Once Weekly    Imaging/Miscellaneous Monitoring: None    ID Pharmacist Interventions: Therapy Duration Paged NSG NP as saw conflicting ceftriaxone durations of therapy. Inpatient ID was consulted with a recommended to continue through 5/4/2023. There was also mention of therapy through 5/11. NSG NP confirmed attending neurosurgeon wanted an extended therapy duration, unclear why a longer duration is desired. Of note, pateint does not have coverage for home care. [facility] will not coordinate home nurse visits for dressing changes and line removal. Tentative plans in place for patient to follow-up with PCP for line cares and lab draws.                         Zulema Esparza, PharmD, BCIDP  Pager:  [phone]

## 2023-05-03 NOTE — PROGRESS NOTES
Surgery Progress Note    Edwin Clements  [id number]     shunt placed yesterday.  No acute overnight events.   [facility]  Subjectively reports no significant abdominal pain.      Temp:  [97.4  F (36.3  C)-98.4  F (36.9  C)] 97.9  F (36.6  C)  Pulse:  [54-96] 57  Resp:  [12-26] 15  BP: (104-141)/(66-99) 113/71  SpO2:  [92 %-100 %] 98 %    Intake/Output Summary (Last 24 hours) at 5/3/2023 0704  Last data filed at 5/3/2023 0700  Gross per 24 hour   Intake 2843.33 ml   Output 550 ml   Net 2293.33 ml       NAD, resting comfortably  regular rate and rhythm on monitor  non-labored breathing  soft, non tender, non distended, surgical incisions c/d/i closed with exoffin  warm and well perfused   alert, oriented    48 year old male POD 1 s/p  shunt placement. Abdominal exam benign. Incisions healing appropriately, no concerns from a general surgery perspective.    - Wounds healing appropriately, abdominal exam benign  - General Surgery to sign off  - Please reach out if new abdominal concerns    Seen with [facility] resident and will be discussed with staff    Gagan Deleon MD  Intern, General Surgery  Emergency General Surgery Intern

## 2023-05-03 NOTE — PLAN OF CARE
"Neuro: A&Ox4. PERRLA. 5/5 strength in all extremities. Utilized PRN 10 mg oxy x1 for incisional pain, pt reported \"oxy doesn't do much\" for him. NSG ordered PRN PO dilaudid which pt utilized 2x and reported adequate relief from.     Cardiac: SB-SR. HR in mid to low 50s when asleep-80's when awake. SBP<140 w/o PRNs. Afebrile.     Respiratory: RA. Did require 1L NC when asleep.    GI: Hypoactive bowel sounds. (-) for flatus. Scheduled senna given. Tolerating clear liquid diet, ADAT.    : Voiding spontaneously on own    LDAs: R single lumen PICC    Integ: 3 new abd/lap sites, 1 L/top of head incision covered w/ primapore, 1 L posterior head incision covered w/ primapore, 1 prior incision behind L ear ASHWINI, 1 left neck incision covered w/ primapore    Labs: M.8, replaced with 1 dose of mag ox    Misc: NS at 100 mL/hr    For vital signs and complete assessments, please see documentation flowsheets    Plan: discharge home this morning     Problem: Plan of Care - These are the overarching goals to be used throughout the patient stay.    Goal: Optimal Comfort and Wellbeing  Intervention: Monitor Pain and Promote Comfort  Recent Flowsheet Documentation  Taken 5/3/2023 0400 by Oneyda Crowder, RN  Pain Management Interventions:    medication (see MAR)    quiet environment facilitated  Taken 5/3/2023 0000 by Oneyda Crowder, RN  Pain Management Interventions:    MD notified (comment)    medication (see MAR)    emotional support    quiet environment facilitated  Taken 2023 2103 by Oneyda Crowder, RN  Pain Management Interventions:    medication (see MAR)    care clustered    emotional support    quiet environment facilitated    rest     Goal Outcome Evaluation: Plan reviewed with patient                        "

## 2023-05-03 NOTE — PROGRESS NOTES
Neurocritical Care Multi-Disciplinary Note    Reason for critical care admission:  shunt status   Primary Team: TIM[facility]  Date of Service:  05/03/2023  Date of Admission:  4/17/2023  Hospital Day: 17    Patient condition reviewed and discussed while on multidisciplinary rounds today. Please note these minor interventions that were initiated:  1. None    The Neurocritical Care service will continue to follow peripherally while the patient is within the ICU. We are readily available should issues arise. Please feel free to contact us for critical care issues with which we may be of assistance. For all other concerns, please contact primary service first.     Please contact NCC team phone at *[phone].    TEREZA Sosa, CNP  Neurocritical Care *[phone]

## 2023-05-04 LAB
BACTERIA CSF CULT: ABNORMAL
BACTERIA CSF CULT: ABNORMAL

## 2023-05-05 ENCOUNTER — TELEPHONE (OUTPATIENT)
Dept: NEUROSURGERY | Facility: CLINIC | Age: 48
End: 2023-05-05
Payer: COMMERCIAL

## 2023-05-05 LAB
BACTERIA CSF CULT: ABNORMAL
BACTERIA CSF CULT: NO GROWTH
GRAM STAIN RESULT: NORMAL
GRAM STAIN RESULT: NORMAL

## 2023-05-05 NOTE — TELEPHONE ENCOUNTER
Called patient for post-op follow up.    Date of Surgery:4/2/23   Procedure: Stealth Assisted INSERTION, SHUNT, VENTRICULOPERITONEAL, left, explantation of external venticular drain  Date of Discharge:   Surgeons: Dr. Martínez    Patient reports doing well since discharge. Pain described as mild, located along incision. Taking oxycodone and tylenol for pain with relief.     Patient is walking intermittently around home. Has had a bowel movement and continues to take stool softeners.     Incision is open to air and patient denies s/s of infection. Denies s/s of CSF leak.    Discharge instructions and medication use were reviewed. Direct phone number provided: [phone] or after hours/weekends: [phone] (ask for Neurosurgery on-call resident). Patient has no further questions and verbalized understanding and agreement with current plan.    Follow up:     ID on 5/9 - virtual visit  PCP for PICC and incison suture removal  Dr. Martínez 4-6 weeks

## 2023-05-05 NOTE — TELEPHONE ENCOUNTER
DIAGNOSIS: Per Pt, Referred for Hospital follow up for  (ventriculoperitoneal) shunt status by Manuelito Ruiz. Pt would like a send video link to [phone]   DATE RECEIVED: 5.9.23   NOTES (Gather within 2 years) STATUS DETAILS   OFFICE NOTE from referring provider   Internal 4.17.23  [facility]   OFFICE NOTE from other specialist Internal 4.11.23  [facility]  Neurosurgery    4.5.23  [facility] Neurosurg    3.21.23, 4.12.21 + more with  Andrea  HP    DISCHARGE SUMMARY from hospital Internal 4.17-5.3.23  [facility]   DISCHARGE REPORT from the ER CE 3.21.23  [facility] ER   LABS (any labs) Internal    MEDICATION LIST Internal    IMAGING  (NEED IMAGES AND REPORTS)     Osteomyelitis: Foot imaging      Liver Abscess: Abdominal imagineg     Other (anything related to diagnoses Internal Multiple imaging available

## 2023-05-06 LAB — BACTERIA CSF CULT: ABNORMAL

## 2023-05-07 LAB — BACTERIA CSF CULT: ABNORMAL

## 2023-05-08 LAB — BACTERIA CSF CULT: ABNORMAL

## 2023-05-09 ENCOUNTER — VIRTUAL VISIT (OUTPATIENT)
Dept: INFECTIOUS DISEASES | Facility: CLINIC | Age: 48
End: 2023-05-09
Attending: INTERNAL MEDICINE
Payer: COMMERCIAL

## 2023-05-09 ENCOUNTER — TELEPHONE (OUTPATIENT)
Dept: INFECTIOUS DISEASES | Facility: CLINIC | Age: 48
End: 2023-05-09

## 2023-05-09 ENCOUNTER — PRE VISIT (OUTPATIENT)
Dept: INFECTIOUS DISEASES | Facility: CLINIC | Age: 48
End: 2023-05-09
Payer: COMMERCIAL

## 2023-05-09 DIAGNOSIS — T85.730D INFECTION OF VENTRICULOPERITONEAL SHUNT, SUBSEQUENT ENCOUNTER: Primary | ICD-10-CM

## 2023-05-09 DIAGNOSIS — Z98.2 VP (VENTRICULOPERITONEAL) SHUNT STATUS: ICD-10-CM

## 2023-05-09 PROCEDURE — 99203 OFFICE O/P NEW LOW 30 MIN: CPT | Mod: 24 | Performed by: INTERNAL MEDICINE

## 2023-05-09 NOTE — PROGRESS NOTES
"Virtual Visit Details    Type of service:  Video Visit     Originating Location (pt. Location): {video visit patient location:461023::\"Home\"}  {PROVIDER LOCATION On-site should be selected for visits conducted from your clinic location or adjoining [facility] hospital, academic office, or other nearby [facility] building. Off-site should be selected for all other provider locations, including home:801648}  Distant Location (provider location):  {virtual location provider:906220}  Platform used for Video Visit: {Virtual Visit Platforms:741921::\"[employer]"}  "

## 2023-05-09 NOTE — PROGRESS NOTES
"Virtual Visit Details    Type of service:  Video Visit     Originating Location (pt. Location): Home  Distant Location (provider location):  Off-site  Platform used for Video Visit: [facility]  New Patient Visit  5/9/2023     Chief Complaint:  Recent hospitalization for  shunt infection    HPI:  Edwin Clements is a 48 year old male with a hx of idiopathic intracranial HTN s/p  shunt (1991) who had recent hospitalization with C acnes  shunt infection.     Per ID consult \"CT head w/o e/o abnormalities. CT CAP at OSH 3/21/23 with \"16.9 cm loculated fluid collection in left mid abdomen associated with tip of  shunt catheter..\" Repeat CT AP 4/5 with decreased size of loculated fluid collection around 14.6cm. Repeat CT AP 4/19 with further decrease in abdominal fluid collection to 10.4 cm. IR evaluated as to whether abdominal fluid collection could be sampled and unfortunately there was no safe window of accesss for sampling or drain placement. Will need serial CT scans to monitor for resolution.      CSF sampled from  shunt continues to be positive via gram stain for GPBs quickly after collection which is concerning. Cell count appears to be downtrending. Cultures from CSF 4/17, 19-21 positive for C acnes. 4/22-24 cultures pending. S/p entire  shunt removal by NSG 4/25 and EVD placement. We will follow IDSA guidelines regarding planning on replacement of  shunt- with C acnes, it is recommended to have a 7 day period after shunt removal with culture clearance prior to replacement. If cultures remain positive, we would need to extend that window.\"    He had his  shunt removed in April and then replaced 5/2/2023.     His plan was to complete IV ceftriaxone through 5/11/2023 (It appears ID was ok with him completing antibiotics on 5/4 but this was extended).     No labs were done so far since his discharge. His last CT abd/pelvis was on 4/19 and his previous abdominal abscess " associated with the the shunt was much improved.     He is going to [place], [place] to have his Picc removed on Thursday.     ROS: Complete 12-point ROS is negative except as noted above.    Past Medical History:  Past Medical History:   Diagnosis Date     Hydrocephalus (H)      Hypertension      Pseudotumor cerebri        Past Surgical History:  Past Surgical History:   Procedure Laterality Date     APPENDECTOMY       HC OR CATH ABLATION NON-CARDIAC ENDOVASCULAR       IMPLANT SHUNT VENTRICULOPERITONEAL       LAPAROSCOPIC ASSISTED IMPLANT SHUNT VENTRICULOPERITONEAL N/A 5/2/2023    Procedure: INSERTION, SHUNT, VENTRICULOPERITONEAL, LAPAROSCOPY-ASSISTED;  Surgeon: Irineo Adame MD;  Location: [facility] OR     OPTICAL TRACKING SYSTEM IMPLANT SHUNT VENTRICULOPERITONEAL N/A 5/2/2023    Procedure: Stealth Assisted INSERTION, SHUNT, VENTRICULOPERITONEAL, left, explantation of external venticular drain;  Surgeon: Arnaud Martínez MD;  Location: [facility] OR     OPTICAL TRACKING SYSTEM VENTRICULOSTOMY Right 4/25/2023    Procedure: placement of external venticular drain;  Surgeon: Arnaud Martínez MD;  Location: [facility] OR     PICC SINGLE LUMEN PLACEMENT Right 04/24/2023    Right basilic vein 47cm total 0cm external.     REMOVE SHUNT VENTRICULOPERITONEAL N/A 4/25/2023    Procedure: REMOVAL, SHUNT, VENTRICULOPERITONEAL,;  Surgeon: Arnaud Martínez MD;  Location[facility]     REVISE SHUNT VENTRICULARPERITONEAL Right 03/26/2015    Procedure: REVISE SHUNT VENTRICULARPERITONEAL;  Surgeon: Ulisses Kyle MD;  Location: [facility] OR       Social History:  Social History     Socioeconomic History     Marital status:      Spouse name: Not on file     Number of children: Not on file     Years of education: Not on file     Highest education level: Not on file   Occupational History     Not on file   Tobacco Use     Smoking status: Former     Smokeless tobacco: Not on file   Vaping Use     Vaping status: Not on file    Substance and Sexual Activity     Alcohol use: Yes     Drug use: Not on file     Sexual activity: Not on file   Other Topics Concern     Not on file   Social History Narrative     Not on file     Social Determinants of Health     Financial Resource Strain: Not on file   Food Insecurity: Not on file   Transportation Needs: Not on file   Physical Activity: Not on file   Stress: Not on file   Social Connections: Not on file   Intimate Partner Violence: Not on file   Housing Stability: Not on file       Family Medical History:  No family history on file.    Allergies:   No Known Allergies    Medications:  Current Outpatient Medications   Medication Sig Dispense Refill     cefTRIAXone (ROCEPHIN) 2 GM vial Inject 2 g into the vein every 12 hours for 8 days 320 mL 0     citalopram (CELEXA) 20 MG tablet Take 20 mg by mouth daily       heparin lock flush 10 UNIT/ML SOLN injection 10 mLs by Intracatheter route every 24 hours 10 mL 0     hydrochlorothiazide (HYDRODIURIL) 25 MG tablet Take 25 mg by mouth daily       HYDROmorphone (DILAUDID) 4 MG tablet Take 1 tablet (4 mg) by mouth every 6 hours as needed for severe pain 15 tablet 0     lisinopril (PRINIVIL,ZESTRIL) 40 MG tablet Take 40 mg by mouth daily       melatonin 3 MG tablet Take 1 tablet (3 mg) by mouth nightly as needed for sleep 10 tablet 0     metoprolol (TOPROL-XL) 100 MG 24 hr tablet Take 100 mg by mouth daily       potassium 99 MG TABS Take 1 tablet by mouth daily       sodium chloride, PF, 0.9% PF flush 10 mLs by Intracatheter route every 12 hours for 8 days 160 mL 0     polyethylene glycol (MIRALAX) 17 GM/Dose powder Take 17 g by mouth daily (Patient not taking: Reported on 5/9/2023) 510 g 0     rOPINIRole (REQUIP) 0.25 MG tablet Take 1 tablet (0.25 mg) by mouth 2 times daily as needed (Restless legs) (Patient not taking: Reported on 5/9/2023) 15 tablet 0     senna-docusate (SENOKOT-S;PERICOLACE) 8.6-50 MG per tablet Take 1 tablet by mouth 2 times daily  (Patient not taking: Reported on 4/18/2023) 60 tablet 1       Immunizations:    There is no immunization history on file for this patient.    Exam:  B/P: Data Unavailable, T: Data Unavailable, P: Data Unavailable, R: Data Unavailable, Weight: 0 lbs 0 oz  Gen: Alert and in no distress.   Psych: Normal affect. Alert and oriented.   HEENT: PERRL. No icterus. Oropharynx pink and moist without lesions.   Neck: No lymphadenopathy.   CV: Regular rate and rhythm without m/r/g.   Chest: Clear to auscultation bilaterally without wheezes or crackles.   Abdomen: Soft, non-distended. Non-tender. Normal bowel sounds.   Extremities: Warm and well perfused.   Skin: No rashes or lesions noted.     Labs:  WBC   Date Value Ref Range Status   03/26/2015 7.6 4.0 - 11.0 10e9/L Final     WBC Count   Date Value Ref Range Status   05/03/2023 12.6 (H) 4.0 - 11.0 10e3/uL Final       No results found for: CRP    Creatinine   Date Value Ref Range Status   05/03/2023 0.66 (L) 0.67 - 1.17 mg/dL Final   05/02/2023 0.80 0.67 - 1.17 mg/dL Final   05/02/2023 0.66 (L) 0.67 - 1.17 mg/dL Final   03/26/2015 0.83 0.66 - 1.25 mg/dL Final       Assessment and Plan:  Edwin Clements is a 48 year old male with recent  shunt infection with C acnes with associated abdominal loculated fluid collection s/p shunt removal and replacement. He will have a total of 4 weeks of IV ceftriaxone. This is longer than is generally required. He is doing well. At discharge his WBC and CRP were slightly elevated so I would like to check them after another week of IV antibiotics. He should also have a follow up CT abdomen/pelvis without contrast to ensure resolution of his fluid collection.   -CBC, CRP, ESR, CMP on 5/11/23  -CT abdomen/pelvis in the next few weeks to ensure resolution of his abdominal fluid collection/abscess.  -I am ok with him having antibiotics stopped and his picc removed on Thursday as he has had 4 weeks of therapy.      Return to clinic: pending workup.  If things are resolving/resolved he will not need follow up.     Total visit including reviewing records on the day of the visit was 30 minutes.

## 2023-05-09 NOTE — NURSING NOTE
Is the patient currently in the state of [place]? No, pt is in [place]    Visit mode:VIDEO    If the visit is dropped, the patient can be reconnected by: VIDEO VISIT: Text to cell phone: [phone]    Will anyone else be joining the visit? NO      How would you like to obtain your AVS? MyChart    Are changes needed to the allergy or medication list? NO    Reason for visit: Video Visit (consultation)    Alycia ORTIZ

## 2023-05-09 NOTE — LETTER
"5/9/2023       RE: Edwin Clements  [address]  [facility] [address]     Dear Colleague,    Thank you for referring your patient, Edwin Clements, to the [facility]ECT[facility] [place] at [facility]. Please see a copy of my visit note below.    Virtual Visit Details    Type of service:  Video Visit     Originating Location (pt. Location): Home  Distant Location (provider location):  Off-site  Platform used for Video Visit: [facility]  New Patient Visit  5/9/2023     Chief Complaint:  Recent hospitalization for  shunt infection    HPI:  Edwin Clements is a 48 year old male with a hx of idiopathic intracranial HTN s/p  shunt (1991) who had recent hospitalization with C acnes  shunt infection.     Per ID consult \"CT head w/o e/o abnormalities. CT CAP at OSH 3/21/23 with \"16.9 cm loculated fluid collection in left mid abdomen associated with tip of  shunt catheter..\" Repeat CT AP 4/5 with decreased size of loculated fluid collection around 14.6cm. Repeat CT AP 4/19 with further decrease in abdominal fluid collection to 10.4 cm. IR evaluated as to whether abdominal fluid collection could be sampled and unfortunately there was no safe window of accesss for sampling or drain placement. Will need serial CT scans to monitor for resolution.      CSF sampled from  shunt continues to be positive via gram stain for GPBs quickly after collection which is concerning. Cell count appears to be downtrending. Cultures from CSF 4/17, 19-21 positive for C acnes. 4/22-24 cultures pending. S/p entire  shunt removal by NSG 4/25 and EVD placement. We will follow IDSA guidelines regarding planning on replacement of  shunt- with C acnes, it is recommended to have a 7 day period after shunt removal with culture clearance prior to replacement. If cultures remain positive, we would need to extend that window.\"    He had his  shunt " removed in April and then replaced 5/2/2023.     His plan was to complete IV ceftriaxone through 5/11/2023 (It appears ID was ok with him completing antibiotics on 5/4 but this was extended).     No labs were done so far since his discharge. His last CT abd/pelvis was on 4/19 and his previous abdominal abscess associated with the the shunt was much improved.     He is going to [place], [place] to have his Picc removed on Thursday.     ROS: Complete 12-point ROS is negative except as noted above.    Past Medical History:  Past Medical History:   Diagnosis Date    Hydrocephalus (H)     Hypertension     Pseudotumor cerebri        Past Surgical History:  Past Surgical History:   Procedure Laterality Date    APPENDECTOMY      HC OR CATH ABLATION NON-CARDIAC ENDOVASCULAR      IMPLANT SHUNT VENTRICULOPERITONEAL      LAPAROSCOPIC ASSISTED IMPLANT SHUNT VENTRICULOPERITONEAL N/A 5/2/2023    Procedure: INSERTION, SHUNT, VENTRICULOPERITONEAL, LAPAROSCOPY-ASSISTED;  Surgeon: Irineo Adame MD;  [facility]    OPTICAL TRACKING SYSTEM IMPLANT SHUNT VENTRICULOPERITONEAL N/A 5/2/2023    Procedure: Stealth Assisted INSERTION, SHUNT, VENTRICULOPERITONEAL, left, explantation of external venticular drain;  Surgeon: Arnaud Martínez MD;  Location: [facility] OR    OPTICAL TRACKING SYSTEM VENTRICULOSTOMY Right 4/25/2023    Procedure: placement of external venticular drain;  Surgeon: Arnaud Martínez MD;  Location: [facility] OR    PICC SINGLE LUMEN PLACEMENT Right 04/24/2023    Right basilic vein 47cm total 0cm external.    REMOVE SHUNT VENTRICULOPERITONEAL N/A 4/25/2023    Procedure: REMOVAL, SHUNT, VENTRICULOPERITONEAL,;  Surgeon: Arnaud Martínez MD;  Location[facility]    REVISE SHUNT VENTRICULARPERITONEAL Right 03/26/2015    Procedure: REVISE SHUNT VENTRICULARPERITONEAL;  Surgeon: Ulisses Kyle MD;  Location: [facility] OR       Social History:  Social History     Socioeconomic History    Marital status:       Spouse name: Not on file    Number of children: Not on file    Years of education: Not on file    Highest education level: Not on file   Occupational History    Not on file   Tobacco Use    Smoking status: Former    Smokeless tobacco: Not on file   Vaping Use    Vaping status: Not on file   Substance and Sexual Activity    Alcohol use: Yes    Drug use: Not on file    Sexual activity: Not on file   Other Topics Concern    Not on file   Social History Narrative    Not on file     Social Determinants of Health     Financial Resource Strain: Not on file   Food Insecurity: Not on file   Transportation Needs: Not on file   Physical Activity: Not on file   Stress: Not on file   Social Connections: Not on file   Intimate Partner Violence: Not on file   Housing Stability: Not on file       Family Medical History:  No family history on file.    Allergies:   No Known Allergies    Medications:  Current Outpatient Medications   Medication Sig Dispense Refill    cefTRIAXone (ROCEPHIN) 2 GM vial Inject 2 g into the vein every 12 hours for 8 days 320 mL 0    citalopram (CELEXA) 20 MG tablet Take 20 mg by mouth daily      heparin lock flush 10 UNIT/ML SOLN injection 10 mLs by Intracatheter route every 24 hours 10 mL 0    hydrochlorothiazide (HYDRODIURIL) 25 MG tablet Take 25 mg by mouth daily      HYDROmorphone (DILAUDID) 4 MG tablet Take 1 tablet (4 mg) by mouth every 6 hours as needed for severe pain 15 tablet 0    lisinopril (PRINIVIL,ZESTRIL) 40 MG tablet Take 40 mg by mouth daily      melatonin 3 MG tablet Take 1 tablet (3 mg) by mouth nightly as needed for sleep 10 tablet 0    metoprolol (TOPROL-XL) 100 MG 24 hr tablet Take 100 mg by mouth daily      potassium 99 MG TABS Take 1 tablet by mouth daily      sodium chloride, PF, 0.9% PF flush 10 mLs by Intracatheter route every 12 hours for 8 days 160 mL 0    polyethylene glycol (MIRALAX) 17 GM/Dose powder Take 17 g by mouth daily (Patient not taking: Reported on 5/9/2023) 510 g 0     rOPINIRole (REQUIP) 0.25 MG tablet Take 1 tablet (0.25 mg) by mouth 2 times daily as needed (Restless legs) (Patient not taking: Reported on 5/9/2023) 15 tablet 0    senna-docusate (SENOKOT-S;PERICOLACE) 8.6-50 MG per tablet Take 1 tablet by mouth 2 times daily (Patient not taking: Reported on 4/18/2023) 60 tablet 1       Immunizations:    There is no immunization history on file for this patient.    Exam:  B/P: Data Unavailable, T: Data Unavailable, P: Data Unavailable, R: Data Unavailable, Weight: 0 lbs 0 oz  Gen: Alert and in no distress.   Psych: Normal affect. Alert and oriented.   HEENT: PERRL. No icterus. Oropharynx pink and moist without lesions.   Neck: No lymphadenopathy.   CV: Regular rate and rhythm without m/r/g.   Chest: Clear to auscultation bilaterally without wheezes or crackles.   Abdomen: Soft, non-distended. Non-tender. Normal bowel sounds.   Extremities: Warm and well perfused.   Skin: No rashes or lesions noted.     Labs:  WBC   Date Value Ref Range Status   03/26/2015 7.6 4.0 - 11.0 10e9/L Final     WBC Count   Date Value Ref Range Status   05/03/2023 12.6 (H) 4.0 - 11.0 10e3/uL Final       No results found for: CRP    Creatinine   Date Value Ref Range Status   05/03/2023 0.66 (L) 0.67 - 1.17 mg/dL Final   05/02/2023 0.80 0.67 - 1.17 mg/dL Final   05/02/2023 0.66 (L) 0.67 - 1.17 mg/dL Final   03/26/2015 0.83 0.66 - 1.25 mg/dL Final       Assessment and Plan:  Edwin Clements is a 48 year old male with recent  shunt infection with C acnes with associated abdominal loculated fluid collection s/p shunt removal and replacement. He will have a total of 4 weeks of IV ceftriaxone. This is longer than is generally required. He is doing well. At discharge his WBC and CRP were slightly elevated so I would like to check them after another week of IV antibiotics. He should also have a follow up CT abdomen/pelvis without contrast to ensure resolution of his fluid collection.   -CBC, CRP, ESR, CMP on  5/11/23  -CT abdomen/pelvis in the next few weeks to ensure resolution of his abdominal fluid collection/abscess.  -I am ok with him having antibiotics stopped and his picc removed on Thursday as he has had 4 weeks of therapy.      Return to clinic: pending workup. If things are resolving/resolved he will not need follow up.     Total visit including reviewing records on the day of the visit was 30 minutes.       Again, thank you for allowing me to participate in the care of your patient.      Sincerely,    Kylie Lamas MD

## 2023-05-09 NOTE — TELEPHONE ENCOUNTER
Pt states he wants to schedule lab work at his local clinic in [place], [place].    Alycia Davison VF

## 2023-05-10 LAB
BACTERIA CSF CULT: ABNORMAL
BACTERIA CSF CULT: ABNORMAL
BACTERIA CSF CULT: NORMAL

## 2023-05-12 ENCOUNTER — TELEPHONE (OUTPATIENT)
Dept: NEUROSURGERY | Facility: CLINIC | Age: 48
End: 2023-05-12
Payer: COMMERCIAL

## 2023-05-12 LAB — BACTERIA CSF CULT: NORMAL

## 2023-05-12 NOTE — TELEPHONE ENCOUNTER
Patient would like to return to work on Monday 5/15. He states that he understands his activity restrictions. 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 Martínez on 6/6.

## 2023-05-12 NOTE — LETTER
May 12, 2023      Edwin Clements  [address] [place] [id number]        To Whom It May Concern:    Edwin Clements is under my professional care for a neurological condition. He may return to work on 5/15/23 with the following restrictions:    - No strenuous activity  - No heavy lifting (greater than 10 lbs)  -       Sincerely,        Yokasta Gillespie

## 2023-05-12 NOTE — LETTER
May 12, 2023      Edwin Clements  [address] [place] [id number]        To Whom It May Concern:    Edwin Clements is under my professional care for a neurological condition. His next appointment is scheduled for 6/6/23. He may return to work on 5/15/23 with the following restrictions:    - No strenuous activity  - No heavy lifting (greater than 10 lbs)  - No bending forward    Please feel free to call me if I can be of further assistance.      Sincerely,    MD Kelly Nam, RN Care Coordinator  [facility] Neurosurgery  Direct: [phone]  Neurosurgery: [phone]

## 2023-05-12 NOTE — LETTER
May 12, 2023      Edwin Clements  [address] [place] [id number]        To Whom It May Concern:    Edwin Clements is under my professional care for a neurological condition. His next appointment is scheduled for 6/6/23. He may return to work on 5/15/23 with the following restrictions:    - No strenuous activity  - No heavy lifting (greater than 10 lbs)    Please feel free to call me if I can be of further assistance.      Sincerely,    MD Kelly Nam, RN Care Coordinator  [facility] Neurosurgery  Direct: [phone]  Neurosurgery: [phone]

## 2023-05-12 NOTE — LETTER
May 12, 2023      Edwin Clements  [address] [address]        To Whom It May Concern:    Edwin Clements is under my professional care for a neurological condition. His next appointment is scheduled for 6/6/23. He may return to work on 5/15/23 with the following restrictions:    - No strenuous activity  - No heavy lifting (greater than 10 lbs)    Please feel free to call me if I can be of further assistance.      Sincerely,    Yokasta Gillespie, RNCC for    Arnaud Martínez

## 2023-05-14 LAB — BACTERIA CSF CULT: NORMAL

## 2023-06-01 ENCOUNTER — HEALTH MAINTENANCE LETTER (OUTPATIENT)
Age: 48
End: 2023-06-01

## 2023-06-06 ENCOUNTER — LAB (OUTPATIENT)
Dept: LAB | Facility: CLINIC | Age: 48
End: 2023-06-06
Payer: COMMERCIAL

## 2023-06-06 ENCOUNTER — OFFICE VISIT (OUTPATIENT)
Dept: NEUROSURGERY | Facility: CLINIC | Age: 48
End: 2023-06-06
Payer: COMMERCIAL

## 2023-06-06 ENCOUNTER — ANCILLARY PROCEDURE (OUTPATIENT)
Dept: CT IMAGING | Facility: CLINIC | Age: 48
End: 2023-06-06
Attending: NEUROLOGICAL SURGERY
Payer: COMMERCIAL

## 2023-06-06 VITALS
SYSTOLIC BLOOD PRESSURE: 128 MMHG | HEIGHT: 72 IN | BODY MASS INDEX: 27.77 KG/M2 | HEART RATE: 60 BPM | OXYGEN SATURATION: 99 % | WEIGHT: 205 LBS | DIASTOLIC BLOOD PRESSURE: 86 MMHG

## 2023-06-06 DIAGNOSIS — Z98.2 VP (VENTRICULOPERITONEAL) SHUNT STATUS: ICD-10-CM

## 2023-06-06 DIAGNOSIS — G91.9 HYDROCEPHALUS (H): ICD-10-CM

## 2023-06-06 DIAGNOSIS — Z98.2 VP (VENTRICULOPERITONEAL) SHUNT STATUS: Primary | ICD-10-CM

## 2023-06-06 LAB
ALBUMIN SERPL BCG-MCNC: 4.7 G/DL (ref 3.5–5.2)
ALP SERPL-CCNC: 87 U/L (ref 40–129)
ALT SERPL W P-5'-P-CCNC: 25 U/L (ref 10–50)
ANION GAP SERPL CALCULATED.3IONS-SCNC: 12 MMOL/L (ref 7–15)
AST SERPL W P-5'-P-CCNC: 31 U/L (ref 10–50)
BASOPHILS # BLD AUTO: 0 10E3/UL (ref 0–0.2)
BASOPHILS NFR BLD AUTO: 1 %
BILIRUB SERPL-MCNC: 0.6 MG/DL
BUN SERPL-MCNC: 20.4 MG/DL (ref 6–20)
CALCIUM SERPL-MCNC: 10 MG/DL (ref 8.6–10)
CHLORIDE SERPL-SCNC: 100 MMOL/L (ref 98–107)
CREAT SERPL-MCNC: 0.72 MG/DL (ref 0.67–1.17)
CRP SERPL-MCNC: <3 MG/L
DEPRECATED HCO3 PLAS-SCNC: 25 MMOL/L (ref 22–29)
EOSINOPHIL # BLD AUTO: 0.1 10E3/UL (ref 0–0.7)
EOSINOPHIL NFR BLD AUTO: 1 %
ERYTHROCYTE [DISTWIDTH] IN BLOOD BY AUTOMATED COUNT: 17.4 % (ref 10–15)
ERYTHROCYTE [SEDIMENTATION RATE] IN BLOOD BY WESTERGREN METHOD: 4 MM/HR (ref 0–15)
GFR SERPL CREATININE-BSD FRML MDRD: >90 ML/MIN/1.73M2
GLUCOSE SERPL-MCNC: 101 MG/DL (ref 70–99)
HCT VFR BLD AUTO: 45.7 % (ref 40–53)
HGB BLD-MCNC: 15.6 G/DL (ref 13.3–17.7)
IMM GRANULOCYTES # BLD: 0 10E3/UL
IMM GRANULOCYTES NFR BLD: 0 %
LYMPHOCYTES # BLD AUTO: 1.9 10E3/UL (ref 0.8–5.3)
LYMPHOCYTES NFR BLD AUTO: 41 %
MCH RBC QN AUTO: 30.4 PG (ref 26.5–33)
MCHC RBC AUTO-ENTMCNC: 34.1 G/DL (ref 31.5–36.5)
MCV RBC AUTO: 89 FL (ref 78–100)
MONOCYTES # BLD AUTO: 0.5 10E3/UL (ref 0–1.3)
MONOCYTES NFR BLD AUTO: 10 %
NEUTROPHILS # BLD AUTO: 2.2 10E3/UL (ref 1.6–8.3)
NEUTROPHILS NFR BLD AUTO: 47 %
NRBC # BLD AUTO: 0 10E3/UL
NRBC BLD AUTO-RTO: 0 /100
PLATELET # BLD AUTO: 367 10E3/UL (ref 150–450)
POTASSIUM SERPL-SCNC: 4.4 MMOL/L (ref 3.4–5.3)
PROT SERPL-MCNC: 7.8 G/DL (ref 6.4–8.3)
RBC # BLD AUTO: 5.14 10E6/UL (ref 4.4–5.9)
SODIUM SERPL-SCNC: 137 MMOL/L (ref 136–145)
WBC # BLD AUTO: 4.7 10E3/UL (ref 4–11)

## 2023-06-06 PROCEDURE — 36415 COLL VENOUS BLD VENIPUNCTURE: CPT | Performed by: PATHOLOGY

## 2023-06-06 PROCEDURE — 85652 RBC SED RATE AUTOMATED: CPT | Performed by: PATHOLOGY

## 2023-06-06 PROCEDURE — 86140 C-REACTIVE PROTEIN: CPT | Performed by: PATHOLOGY

## 2023-06-06 PROCEDURE — 80053 COMPREHEN METABOLIC PANEL: CPT | Performed by: PATHOLOGY

## 2023-06-06 PROCEDURE — 85025 COMPLETE CBC W/AUTO DIFF WBC: CPT | Performed by: PATHOLOGY

## 2023-06-06 PROCEDURE — 99024 POSTOP FOLLOW-UP VISIT: CPT | Performed by: NEUROLOGICAL SURGERY

## 2023-06-06 PROCEDURE — 70450 CT HEAD/BRAIN W/O DYE: CPT | Mod: GC | Performed by: RADIOLOGY

## 2023-06-06 ASSESSMENT — PAIN SCALES - GENERAL: PAINLEVEL: MILD PAIN (2)

## 2023-06-06 NOTE — LETTER
6/6/2023       RE: Edwin Clements  [address]  [facility] [address]     Dear Colleague,    Thank you for referring your patient, Edwin Clements, to the [facility] NEUROSURGERY CLINIC [place] at [facility]. Please see a copy of my visit note below.    Dear Dr. Andrea,    I had the opportunity to see Mr. Clements today in my clinic.  He is a patient whom I had replaced his ventriculoperitoneal shunt approximately 6 weeks ago.  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  He was admitted for removal over the shunt, ventricular drain placement, antibiotic therapy, and ultimately replacement of his ventriculoperitoneal shunt with a level 2 delta valve.  He subsequently went home with a PICC line for continued antibiotics.  During this past several weeks, he went to an outside emergency room because of severe abdominal pain.  CT of his abdomen did not review any reason for his abdominal pain and it resolved.  He still has occasional pain.  The CT did not show a fluid collection.  He did have some fluid in the abdomen which is consistent with a working shunt.  He also complains of headaches behind his eyes bilaterally.  He does not have it when he wakes up.  It gets worse as the day goes by.  He has several a day.  On examination his shunt sites on both sides of his frontal area looked well-healed.  The shunt on palpation appears to be intact.  His abdominal incisions are well-healed.  There is no erythema except for a small punctate area on the patient's left side.  The incisions on his abdomen are nontender.  His extraocular muscles are normal.  His face is symmetric.  His hearing is grossly intact.  Vision is grossly intact.  Speech is normal.  Rapid alternating movements were fine.  Gait and station were normal.  He was able to rise from a chair easily.    I suspect Mr. Clements is adjusting to his delta level 2 valve  still.  His symptoms are typically opposite of what we expect from a malfunctioning shunt given that it is worse as he gets up.  Sometimes over drainage could be a cause of this as well but the valve he has is a high-pressure valve.  The fact that there is no cyst forming in his belly and that there is minimal fluid suggest that the distal portion is working well.  I do not feel that a shunt series is necessary at this point given that the cranial portion of the shunt does not typically move several days after the surgery if it is well fastened.  I will however obtain a CT of the head to serve as a baseline and also to look for buildup of fluid.  I will also obtain some inflammatory marker blood work given that the symptoms that he had were similar to before the shunt was removed. We will call him with the results of we can get those scheduled if there are any issues with him we will have him come back in revisit with us.  Otherwise I will see him in the year's time.    Sincerely,    Arnaud Martínez MD

## 2023-06-06 NOTE — PROGRESS NOTES
Dear Dr. Andrea,    I had the opportunity to see Mr. Clements today in my clinic.  He is a patient whom I had replaced his ventriculoperitoneal shunt approximately 6 weeks ago.  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  He was admitted for removal over the shunt, ventricular drain placement, antibiotic therapy, and ultimately replacement of his ventriculoperitoneal shunt with a level 2 delta valve.  He subsequently went home with a PICC line for continued antibiotics.  During this past several weeks, he went to an outside emergency room because of severe abdominal pain.  CT of his abdomen did not review any reason for his abdominal pain and it resolved.  He still has occasional pain.  The CT did not show a fluid collection.  He did have some fluid in the abdomen which is consistent with a working shunt.  He also complains of headaches behind his eyes bilaterally.  He does not have it when he wakes up.  It gets worse as the day goes by.  He has several a day.  On examination his shunt sites on both sides of his frontal area looked well-healed.  The shunt on palpation appears to be intact.  His abdominal incisions are well-healed.  There is no erythema except for a small punctate area on the patient's left side.  The incisions on his abdomen are nontender.  His extraocular muscles are normal.  His face is symmetric.  His hearing is grossly intact.  Vision is grossly intact.  Speech is normal.  Rapid alternating movements were fine.  Gait and station were normal.  He was able to rise from a chair easily.    I suspect Mr. Clements is adjusting to his delta level 2 valve still.  His symptoms are typically opposite of what we expect from a malfunctioning shunt given that it is worse as he gets up.  Sometimes over drainage could be a cause of this as well but the valve he has is a high-pressure valve.  The fact that there is no cyst forming in his belly and that there is minimal  fluid suggest that the distal portion is working well.  I do not feel that a shunt series is necessary at this point given that the cranial portion of the shunt does not typically move several days after the surgery if it is well fastened.  I will however obtain a CT of the head to serve as a baseline and also to look for buildup of fluid.  I will also obtain some inflammatory marker blood work given that the symptoms that he had were similar to before the shunt was removed. We will call him with the results of we can get those scheduled if there are any issues with him we will have him come back in revisit with us.  Otherwise I will see him in the year's time.

## 2023-06-06 NOTE — PATIENT INSTRUCTIONS
Thank you for choosing [facility]. You were seen in the Neurosurgery Clinic today with Dr. Martínez.    Next steps:  Complete CT head without contrast.  Labs: CBC, CRP & ESR  Return to clinic in 1 year for follow up with Dr. Martínez  Complete CT head prior to appointment    Please don't hesitate to reach out via MyChart or telephone if you have any questions after your visit.    Yokasta Gillespie RN Care Coordinator, Neurosurgery    Direct: [phone]  Neurosurgery Clinic: [phone]

## 2023-10-24 ENCOUNTER — VIRTUAL VISIT (OUTPATIENT)
Dept: NEUROSURGERY | Facility: CLINIC | Age: 48
End: 2023-10-24
Payer: COMMERCIAL

## 2023-10-24 VITALS — HEIGHT: 72 IN | BODY MASS INDEX: 27.77 KG/M2 | WEIGHT: 205 LBS

## 2023-10-24 DIAGNOSIS — Z98.2 VP (VENTRICULOPERITONEAL) SHUNT STATUS: Primary | ICD-10-CM

## 2023-10-24 PROCEDURE — 99213 OFFICE O/P EST LOW 20 MIN: CPT | Mod: 95 | Performed by: NEUROLOGICAL SURGERY

## 2023-10-24 ASSESSMENT — PAIN SCALES - GENERAL: PAINLEVEL: NO PAIN (0)

## 2023-10-24 NOTE — LETTER
10/24/2023       RE: Edwin Clements  [address]  [facility] [address]     Dear Colleague,    Thank you for referring your patient, Edwin Clements, to the [facility]. Please see a copy of my visit note below.    Virtual Visit Details    Type of service:  Video 10 minutes of video.    Mr. Clements is a gentleman I rivsed his shunt.  He had a shunt infectino with a pseudocyst formation.  He is doing very well. Best that he has felt in years.  He is working, which is where he was calling him for his visits.  No fevers, shakes, or chills.  Inicisions ok.  No trouble with memory.  Things going well at work.  Plan is for him to geta baseline CT next year wih another virual visit.      Again, thank you for allowing me to participate in the care of your patient.      Sincerely,    Arnaud Martínez MD

## 2023-10-24 NOTE — PROGRESS NOTES
Mr. Clements is a gentleman I rivsed his shunt.  He had a shunt infectino with a pseudocyst formation.  He is doing very well. Best that he has felt in years.  He is working, which is where he was calling him for his visits.  No fevers, shakes, or chills.  Inicisions ok.  No trouble with memory.  Things going well at work.  Plan is for him to geta baseline CT next year wih another virual visit.  
Virtual Visit Details    Type of service:  Video 10 minutes of video.  
vomiting

## 2023-10-24 NOTE — NURSING NOTE
Is the patient currently in the state of [place]? [place], [place]    Visit mode:VIDEO    If the visit is dropped, the patient can be reconnected by: VIDEO VISIT: Text to cell phone:   Telephone Information:   Mobile [phone]       Will anyone else be joining the visit? NO  (If patient encounters technical issues they should call [phone]    How would you like to obtain your AVS? MyChart    Are changes needed to the allergy or medication list? No    Reason for visit: Follow Up    Rosalva HERNANDEZ

## 2024-01-30 DIAGNOSIS — Z98.2 VP (VENTRICULOPERITONEAL) SHUNT STATUS: Primary | ICD-10-CM

## 2024-06-04 ENCOUNTER — TELEPHONE (OUTPATIENT)
Dept: NEUROSURGERY | Facility: CLINIC | Age: 49
End: 2024-06-04

## 2024-06-04 NOTE — TELEPHONE ENCOUNTER
Left Voicemail (1st Attempt) for the patient to call back and schedule the following:    Appointment type: Return adult neurosg  Provider: Dr Martínez  Return date: First available  Specialty phone number: [phone]  Additional appointment(s) needed: CT scan prior  Additonal Notes: pt missed appts on June 4th. Please help resched.

## 2024-06-08 ENCOUNTER — HEALTH MAINTENANCE LETTER (OUTPATIENT)
Age: 49
End: 2024-06-08

## 2024-06-10 ENCOUNTER — TELEPHONE (OUTPATIENT)
Dept: NEUROSURGERY | Facility: CLINIC | Age: 49
End: 2024-06-10
Payer: COMMERCIAL

## 2024-06-10 NOTE — TELEPHONE ENCOUNTER
Left Voicemail (2nd Attempt) and sent [employer] message for the patient to call back and schedule the following:     Appointment type: Return adult neurosg  Provider: Dr Martínez  Return date: First available  Specialty phone number: [phone]  Additional appointment(s) needed: CT scan prior  Additonal Notes: pt missed appts on June 4th. Please help resched.

## 2025-06-15 ENCOUNTER — HEALTH MAINTENANCE LETTER (OUTPATIENT)
Age: 50
End: 2025-06-15

## (undated) DEVICE — ENDO TROCAR FIRST ENTRY KII FIOS Z-THRD 12X100MM CTF73

## (undated) DEVICE — DRSG BIOPATCH GERMICIDAL SPLIT SPONGE 1.5MM SM 4151

## (undated) DEVICE — CATH VA INTR 10FRX14CM KIT

## (undated) DEVICE — STPL SKIN 35W ROTATING HEAD PRW35

## (undated) DEVICE — SU MONOCRYL 3-0 PS-1 27" Y936H

## (undated) DEVICE — PREP SKIN SCRUB TRAY 4461A

## (undated) DEVICE — DRAPE POUCH IRR 1016

## (undated) DEVICE — DRSG PRIMAPORE 03 1/8X6" 66000318

## (undated) DEVICE — SU VICRYL 0 UR-6 27" J603H

## (undated) DEVICE — PREP CHLORAPREP CLEAR 3ML 930400

## (undated) DEVICE — ESU GROUND PAD ADULT W/CORD E7507

## (undated) DEVICE — DRAPE IOBAN INCISE 13X13" 6640EZ

## (undated) DEVICE — TRACKER PATIENT NON-INVASIVE AXIEM 9734887XOM

## (undated) DEVICE — ANTIFOG SOLUTION W/FOAM PAD 31142527

## (undated) DEVICE — DRSG GAUZE 4X4" TRAY 6939

## (undated) DEVICE — SUTURE BOOTS 051003PBX

## (undated) DEVICE — SU SILK 2-0 SH CR 5X18" C0125

## (undated) DEVICE — GLOVE BIOGEL PI MICRO SZ 8.0 48580

## (undated) DEVICE — SU MONOCRYL 4-0 P-3 18" UND Y494G

## (undated) DEVICE — NDL 21GA 1.5"

## (undated) DEVICE — SURGICEL HEMOSTAT 4X8" 1952

## (undated) DEVICE — SU SILK 2-0 TIE 12X30" A305H

## (undated) DEVICE — BLADE 10MM 11-1753

## (undated) DEVICE — LINEN TOWEL PACK X6 WHITE 5487

## (undated) DEVICE — CATH VENTRICLEAR II EVD ANTI IMPREG 50318

## (undated) DEVICE — Device

## (undated) DEVICE — SU VICRYL 3-0 SH CR 8X18" J774

## (undated) DEVICE — TUBING SMOKE EVAC PNEUMOCLEAR HIGH FLOW 0620050250

## (undated) DEVICE — SHUNT TUNNELER SHEATH 61CM NT901124

## (undated) DEVICE — STRAP UNIVERSAL POSITIONING 2-PIECE 4X47X76" 91-287

## (undated) DEVICE — SPONGE SURGIFOAM 12 1972

## (undated) DEVICE — ENDO TROCAR BLUNT TIP KII BALLOON 12X130MM C0R50

## (undated) DEVICE — DEVICE SUTURE GRASPER TROCAR CLOSURE 14GA PMITCSG

## (undated) DEVICE — ENDO TROCAR FIRST ENTRY KII FIOS Z-THRD 05X100MM CTF03

## (undated) DEVICE — PERFORATOR CRANIAL DGR-O SURGICAL 11-14MM PEDS 200-271

## (undated) DEVICE — SHUNT STYLET 23CM AXIEM NAVIGATION SYSTEM 9735428

## (undated) DEVICE — CLIP RANEY

## (undated) DEVICE — PREP CHLORAPREP 26ML TINTED ORANGE  260815

## (undated) DEVICE — SU VICRYL 2-0 CT-2 27" UND J269H

## (undated) DEVICE — GLOVE BIOGEL PI ULTRATOUCH SZ 7.5 41175

## (undated) DEVICE — SU VICRYL 2-0 SH 27" UND J417H

## (undated) DEVICE — PROTECTOR ARM ONE-STEP TRENDELENBURG 40418

## (undated) DEVICE — PACK NEURO MINOR UMMC SNE32MNMU4

## (undated) DEVICE — LINEN TOWEL PACK X5 5464

## (undated) DEVICE — SUCTION MANIFOLD NEPTUNE 2 SYS 4 PORT 0702-020-000

## (undated) DEVICE — DECANTER VIAL 2006S

## (undated) DEVICE — PREP POVIDONE-IODINE 10% SOLUTION 4OZ BOTTLE MDS093944

## (undated) DEVICE — SU SILK 0 TIE 6X30" A306H

## (undated) DEVICE — SOL WATER IRRIG 1000ML BOTTLE 2F7114

## (undated) DEVICE — PREP POVIDONE-IODINE 7.5% SCRUB 4OZ BOTTLE MDS093945

## (undated) DEVICE — DRAPE SHEET REV FOLD 3/4 9349

## (undated) DEVICE — SU MONOCRYL 4-0 RB-1 27" Y214H

## (undated) DEVICE — NDL INSUFFLATION 13GA 120MM C2201

## (undated) DEVICE — BLADE CLIPPER SGL USE 9680

## (undated) DEVICE — SU ETHILON 3-0 PS-1 18" 1663H

## (undated) DEVICE — DRSG PRIMAPORE 02X3" 7133

## (undated) DEVICE — ESU ELEC FLEX MONOPOLAR COAG 1MMX53CM 28160KA/1

## (undated) DEVICE — LABEL MEDICATION SYSTEM 3303-P

## (undated) DEVICE — SU VICRYL 3-0 SH 8X18" UND J864D

## (undated) DEVICE — DRAPE IOBAN INCISE 23X17" 6650EZ

## (undated) DEVICE — SPONGE RAY-TEC 4X8" 7318

## (undated) DEVICE — PAD CHUX UNDERPAD 23X24" 7136

## (undated) DEVICE — SU VICRYL 2-0 CT-2 CR 8X18" J726D

## (undated) DEVICE — ENDO TROCAR SLEEVE KII Z-THREADED 05X100MM CTS02

## (undated) DEVICE — NDL BLUNT 17GA 1.5" 8881202330

## (undated) DEVICE — BLADE KNIFE SURG 15 371115

## (undated) RX ORDER — FENTANYL CITRATE 50 UG/ML
INJECTION, SOLUTION INTRAMUSCULAR; INTRAVENOUS
Status: DISPENSED
Start: 2023-04-25

## (undated) RX ORDER — HYDROMORPHONE HCL IN WATER/PF 6 MG/30 ML
PATIENT CONTROLLED ANALGESIA SYRINGE INTRAVENOUS
Status: DISPENSED
Start: 2023-04-25

## (undated) RX ORDER — ONDANSETRON 2 MG/ML
INJECTION INTRAMUSCULAR; INTRAVENOUS
Status: DISPENSED
Start: 2023-04-25

## (undated) RX ORDER — HYDROMORPHONE HYDROCHLORIDE 1 MG/ML
INJECTION, SOLUTION INTRAMUSCULAR; INTRAVENOUS; SUBCUTANEOUS
Status: DISPENSED
Start: 2023-05-02

## (undated) RX ORDER — FENTANYL CITRATE 50 UG/ML
INJECTION, SOLUTION INTRAMUSCULAR; INTRAVENOUS
Status: DISPENSED
Start: 2023-05-02

## (undated) RX ORDER — FENTANYL CITRATE-0.9 % NACL/PF 10 MCG/ML
PLASTIC BAG, INJECTION (ML) INTRAVENOUS
Status: DISPENSED
Start: 2023-05-02

## (undated) RX ORDER — HYDROMORPHONE HCL IN WATER/PF 6 MG/30 ML
PATIENT CONTROLLED ANALGESIA SYRINGE INTRAVENOUS
Status: DISPENSED
Start: 2023-05-02

## (undated) RX ORDER — GABAPENTIN 300 MG/1
CAPSULE ORAL
Status: DISPENSED
Start: 2023-04-25

## (undated) RX ORDER — GENTAMICIN 40 MG/ML
INJECTION, SOLUTION INTRAMUSCULAR; INTRAVENOUS
Status: DISPENSED
Start: 2023-05-02

## (undated) RX ORDER — PROPOFOL 10 MG/ML
INJECTION, EMULSION INTRAVENOUS
Status: DISPENSED
Start: 2023-05-02

## (undated) RX ORDER — PROPOFOL 10 MG/ML
INJECTION, EMULSION INTRAVENOUS
Status: DISPENSED
Start: 2023-04-25

## (undated) RX ORDER — ACETAMINOPHEN 325 MG/1
TABLET ORAL
Status: DISPENSED
Start: 2023-04-25

## (undated) RX ORDER — DEXAMETHASONE SODIUM PHOSPHATE 4 MG/ML
INJECTION, SOLUTION INTRA-ARTICULAR; INTRALESIONAL; INTRAMUSCULAR; INTRAVENOUS; SOFT TISSUE
Status: DISPENSED
Start: 2023-05-02

## (undated) RX ORDER — KETOROLAC TROMETHAMINE 30 MG/ML
INJECTION, SOLUTION INTRAMUSCULAR; INTRAVENOUS
Status: DISPENSED
Start: 2023-05-02

## (undated) RX ORDER — LIDOCAINE HYDROCHLORIDE AND EPINEPHRINE 10; 10 MG/ML; UG/ML
INJECTION, SOLUTION INFILTRATION; PERINEURAL
Status: DISPENSED
Start: 2023-05-02

## (undated) RX ORDER — LIDOCAINE HYDROCHLORIDE AND EPINEPHRINE 10; 10 MG/ML; UG/ML
INJECTION, SOLUTION INFILTRATION; PERINEURAL
Status: DISPENSED
Start: 2023-04-25

## (undated) RX ORDER — EPHEDRINE SULFATE 50 MG/ML
INJECTION, SOLUTION INTRAMUSCULAR; INTRAVENOUS; SUBCUTANEOUS
Status: DISPENSED
Start: 2023-05-02

## (undated) RX ORDER — HYDROMORPHONE HYDROCHLORIDE 1 MG/ML
INJECTION, SOLUTION INTRAMUSCULAR; INTRAVENOUS; SUBCUTANEOUS
Status: DISPENSED
Start: 2023-04-25

## (undated) RX ORDER — ESMOLOL HYDROCHLORIDE 10 MG/ML
INJECTION INTRAVENOUS
Status: DISPENSED
Start: 2023-05-02

## (undated) RX ORDER — OXYCODONE HYDROCHLORIDE 10 MG/1
TABLET ORAL
Status: DISPENSED
Start: 2023-04-25

## (undated) RX ORDER — SODIUM CHLORIDE 9 MG/ML
INJECTION, SOLUTION INTRAVENOUS
Status: DISPENSED
Start: 2023-04-25

## (undated) RX ORDER — GENTAMICIN 40 MG/ML
INJECTION, SOLUTION INTRAMUSCULAR; INTRAVENOUS
Status: DISPENSED
Start: 2023-04-25

## (undated) RX ORDER — SODIUM CHLORIDE 9 MG/ML
INJECTION, SOLUTION INTRAVENOUS
Status: DISPENSED
Start: 2023-05-02